# Patient Record
Sex: MALE | Race: BLACK OR AFRICAN AMERICAN | Employment: OTHER | URBAN - NONMETROPOLITAN AREA
[De-identification: names, ages, dates, MRNs, and addresses within clinical notes are randomized per-mention and may not be internally consistent; named-entity substitution may affect disease eponyms.]

---

## 2020-09-18 ENCOUNTER — HOSPITAL ENCOUNTER (EMERGENCY)
Age: 70
Discharge: HOME | End: 2020-09-18
Attending: EMERGENCY MEDICINE
Payer: OTHER GOVERNMENT

## 2020-09-18 VITALS
WEIGHT: 158 LBS | RESPIRATION RATE: 18 BRPM | HEART RATE: 75 BPM | TEMPERATURE: 97.6 F | SYSTOLIC BLOOD PRESSURE: 96 MMHG | OXYGEN SATURATION: 99 % | HEIGHT: 67 IN | BODY MASS INDEX: 24.8 KG/M2 | DIASTOLIC BLOOD PRESSURE: 65 MMHG

## 2020-09-18 LAB
ANION GAP SERPL CALCULATED.3IONS-SCNC: 11 MEQ/L (ref 8–16)
BASOPHILS # BLD: 0.7 %
BASOPHILS ABSOLUTE: 0.1 THOU/MM3 (ref 0–0.1)
BUN BLDV-MCNC: 16 MG/DL (ref 7–22)
CALCIUM SERPL-MCNC: 9 MG/DL (ref 8.5–10.5)
CHLORIDE BLD-SCNC: 99 MEQ/L (ref 98–111)
CO2: 21 MEQ/L (ref 23–33)
CREAT SERPL-MCNC: 0.8 MG/DL (ref 0.4–1.2)
EKG ATRIAL RATE: 84 BPM
EKG P AXIS: 57 DEGREES
EKG P-R INTERVAL: 176 MS
EKG Q-T INTERVAL: 346 MS
EKG QRS DURATION: 80 MS
EKG QTC CALCULATION (BAZETT): 408 MS
EKG R AXIS: 55 DEGREES
EKG T AXIS: 61 DEGREES
EKG VENTRICULAR RATE: 84 BPM
EOSINOPHIL # BLD: 1.2 %
EOSINOPHILS ABSOLUTE: 0.1 THOU/MM3 (ref 0–0.4)
ERYTHROCYTE [DISTWIDTH] IN BLOOD BY AUTOMATED COUNT: 14.4 % (ref 11.5–14.5)
ERYTHROCYTE [DISTWIDTH] IN BLOOD BY AUTOMATED COUNT: 46.5 FL (ref 35–45)
GFR SERPL CREATININE-BSD FRML MDRD: > 90 ML/MIN/1.73M2
GLUCOSE BLD-MCNC: 138 MG/DL (ref 70–108)
HCT VFR BLD CALC: 42.1 % (ref 42–52)
HEMOGLOBIN: 13.9 GM/DL (ref 14–18)
IMMATURE GRANS (ABS): 0.01 THOU/MM3 (ref 0–0.07)
IMMATURE GRANULOCYTES: 0.1 %
LYMPHOCYTES # BLD: 17.5 %
LYMPHOCYTES ABSOLUTE: 1.3 THOU/MM3 (ref 1–4.8)
MCH RBC QN AUTO: 28.9 PG (ref 26–33)
MCHC RBC AUTO-ENTMCNC: 33 GM/DL (ref 32.2–35.5)
MCV RBC AUTO: 87.5 FL (ref 80–94)
MONOCYTES # BLD: 5.5 %
MONOCYTES ABSOLUTE: 0.4 THOU/MM3 (ref 0.4–1.3)
NUCLEATED RED BLOOD CELLS: 0 /100 WBC
OSMOLALITY CALCULATION: 266 MOSMOL/KG (ref 275–300)
PLATELET # BLD: 379 THOU/MM3 (ref 130–400)
PMV BLD AUTO: 9 FL (ref 9.4–12.4)
POTASSIUM REFLEX MAGNESIUM: 4.7 MEQ/L (ref 3.5–5.2)
PRO-BNP: 59.9 PG/ML (ref 0–900)
RBC # BLD: 4.81 MILL/MM3 (ref 4.7–6.1)
SEG NEUTROPHILS: 75 %
SEGMENTED NEUTROPHILS ABSOLUTE COUNT: 5.5 THOU/MM3 (ref 1.8–7.7)
SODIUM BLD-SCNC: 131 MEQ/L (ref 135–145)
WBC # BLD: 7.3 THOU/MM3 (ref 4.8–10.8)

## 2020-09-18 PROCEDURE — 36415 COLL VENOUS BLD VENIPUNCTURE: CPT

## 2020-09-18 PROCEDURE — 83880 ASSAY OF NATRIURETIC PEPTIDE: CPT

## 2020-09-18 PROCEDURE — 80048 BASIC METABOLIC PNL TOTAL CA: CPT

## 2020-09-18 PROCEDURE — 99284 EMERGENCY DEPT VISIT MOD MDM: CPT

## 2020-09-18 PROCEDURE — 93005 ELECTROCARDIOGRAM TRACING: CPT | Performed by: EMERGENCY MEDICINE

## 2020-09-18 PROCEDURE — 85025 COMPLETE CBC W/AUTO DIFF WBC: CPT

## 2020-09-18 SDOH — HEALTH STABILITY: MENTAL HEALTH: HOW OFTEN DO YOU HAVE A DRINK CONTAINING ALCOHOL?: NEVER

## 2020-09-18 NOTE — ED PROVIDER NOTES
ATTENDING ATTESTATION  Evaluation of Alejandro Leahy. Patient seen and examined by me. Case discussed and care plan developed with resident. I agree with the note and plan as documented by her, except if my documentation differs. I reviewed the medical, surgical, family and social history, medications and allergies. I have reviewed the nursing documentation. I have reviewed the patient's vital signs. Patient c/o sent to the emergency department for low sodium level. States that [facility] called him and told him to go to the ER. He reports he has had low sodium in the past but cannot remember his baseline number. He denies any symptoms at this time. Physical exam is notable for well appearing, regular rate and rhythm, no murmurs rubs or gallops. Lungs are clear to auscultation bilaterally no wheezes crackles or rhonchi. Patient is alert, resting on bed, in no distress. MDM: Sent to the emergency department for hyponatremia, repeat sodium is 131. Patient is well-appearing, he should follow-up on an outpatient basis given that his sodium level is improved. Plan: As above    Please see the residents' completed note for final disposition except as documented on this attestation. Diagnosis, treatment and disposition plans were discussed and agreed upon by patient. This transcription was electronically signed. It was dictated by use of voice recognition software and electronically transcribed. The transcription may contain errors not detected in proofreading.      I was present for the critical portion following procedures: None       Electronically signed by Jessica Pritchard MD on 9/18/20 at 6:03 PM KD Gray MD  09/18/20 9049

## 2020-09-18 NOTE — ED NOTES
Patient resting on cart, registration at bedside. Patient educated on how to use call light remote for the television.      Galileo Meng RN  09/18/20 2166

## 2020-09-18 NOTE — ED PROVIDER NOTES
[place] ENCOUNTER          Pt Name: Sandrita Mejia  MRN: [id number]  [place] 1950  Date of evaluation: 9/18/2020  Treating Resident Physician: Jacquelyn Jernigan MD  Supervising Physician: Dr. Jah Johnson       Chief Complaint   Patient presents with    Abnormal Lab     History obtained from the patient. HISTORY OF PRESENT ILLNESS    Sandrita Mejia is a 78 y.o. male schizophrenia, hypertension, diabetes, who presents to the emergency department for evaluation of weakness and abnormal lab results. Patient was seen outpatient [facility] with routine lab work, which showed a result of 127 sodium. Patient states that over the past year he has had some chronic generalized weakness, especially in his lower extremities, associated with longstanding neuropathy. Patient denies any acute symptoms. Patient attests to no excess water or alcohol intake, no history of smoking. Patient does receive paliperidone injection from [facility]. Patient denies any confusion, new weakness, shortness of breath, nausea. Patient states that he got routine lab work done in [place]  The patient has no other acute complaints at this time.        REVIEW OF SYSTEMS   Review of Systems   Constitutional: Negative for activity change, appetite change, chills, diaphoresis, fatigue, fever and unexpected weight change. HENT: Negative for rhinorrhea, sinus pressure, sinus pain, sneezing, sore throat, trouble swallowing and voice change. Eyes: Negative for photophobia, pain, redness and visual disturbance. Respiratory: Negative for apnea, cough, choking, chest tightness, shortness of breath, wheezing and stridor. Cardiovascular: Negative for chest pain, palpitations and leg swelling. Gastrointestinal: Negative for abdominal distention, abdominal pain, constipation, diarrhea, nausea and vomiting. Endocrine: Negative for polydipsia, polyphagia and polyuria. Genitourinary: Negative for decreased urine volume, difficulty urinating, dysuria, enuresis, flank pain, frequency, hematuria and urgency. Musculoskeletal: Negative for arthralgias, back pain and gait problem. PAST MEDICAL AND SURGICAL HISTORY     Past Medical History:   Diagnosis Date    Diabetes mellitus ([facility] [address]     Hyperlipidemia      Past Surgical History:   Procedure Laterality Date    COLONOSCOPY      UPPER GASTROINTESTINAL ENDOSCOPY           MEDICATIONS   No current facility-administered medications for this encounter.      Current Outpatient Medications:     hydrOXYzine (ATARAX) 10 MG tablet, Take 10 mg by mouth 3 times daily as needed for Itching, Disp: , Rfl:     buPROPion (WELLBUTRIN XL) 300 MG extended release tablet, Take 300 mg by mouth every morning, Disp: , Rfl:     metFORMIN (GLUCOPHAGE) 1000 MG tablet, Take 1,000 mg by mouth 2 times daily (with meals), Disp: , Rfl:     ferrous sulfate 325 (65 FE) MG tablet, Take 325 mg by mouth daily (with breakfast), Disp: , Rfl:     finasteride (PROSCAR) 5 MG tablet, Take 5 mg by mouth daily, Disp: , Rfl:     omeprazole (PRILOSEC) 20 MG delayed release capsule, Take 20 mg by mouth daily, Disp: , Rfl:     Multiple Vitamins-Minerals (THERAPEUTIC MULTIVITAMIN-MINERALS) tablet, Take 1 tablet by mouth daily, Disp: , Rfl:     aspirin 81 MG tablet, Take 81 mg by mouth daily, Disp: , Rfl:     sildenafil (VIAGRA) 100 MG tablet, Take 100 mg by mouth as needed for Erectile Dysfunction, Disp: , Rfl:     atorvastatin (LIPITOR) 80 MG tablet, Take 80 mg by mouth daily, Disp: , Rfl:     pioglitazone (ACTOS) 30 MG tablet, Take 30 mg by mouth daily, Disp: , Rfl:       SOCIAL HISTORY     Social History     Social History Narrative    Not on file     Social History     Tobacco Use    Smoking status: Never Smoker    Smokeless tobacco: Never Used   Substance Use Topics    Alcohol use: Never     Frequency: Never    Drug use: Never         ALLERGIES   No Known Allergies      FAMILY HISTORY   History reviewed. No pertinent family history. PREVIOUS RECORDS   Previous records reviewed: Past medical, past surgical, allergies, medications. PHYSICAL EXAM     ED Triage Vitals [09/18/20 1601]   BP Temp Temp Source Pulse Resp SpO2 Height Weight   104/69 97.6 °F (36.4 °C) Oral 94 18 98 % 5' 7\" (1.702 m) 158 lb (71.7 kg)     Initial vital signs and nursing assessment reviewed and normal. Pulsoximetry is normal per my interpretation. Additional Vital Signs:  Vitals:    09/18/20 1721   BP: 96/65   Pulse: 75   Resp: 18   Temp:    SpO2: 99%       Physical Exam  Constitutional:       Appearance: Normal appearance. He is normal weight. HENT:      Head: Normocephalic and atraumatic. Right Ear: External ear normal.      Left Ear: External ear normal.      Nose: Nose normal.      Mouth/Throat:      Mouth: Mucous membranes are moist.      Pharynx: Oropharynx is clear. Eyes:      Extraocular Movements: Extraocular movements intact. Conjunctiva/sclera: Conjunctivae normal.      Pupils: Pupils are equal, round, and reactive to light. Neck:      Musculoskeletal: Normal range of motion and neck supple. Cardiovascular:      Rate and Rhythm: Normal rate and regular rhythm. Pulses: Normal pulses. Heart sounds: Normal heart sounds. Pulmonary:      Effort: Pulmonary effort is normal. No respiratory distress. Breath sounds: Normal breath sounds. No stridor. No wheezing or rales. Abdominal:      General: Abdomen is flat. Palpations: Abdomen is soft. Musculoskeletal: Normal range of motion. General: No swelling. Right lower leg: No edema. Left lower leg: No edema. Neurological:      Mental Status: He is alert. Cranial Nerves: No cranial nerve deficit. Sensory: Sensory deficit (Chronically decreased sensation to his distal lower extremities) present. Motor: No weakness.       Coordination: Coordination normal.      Gait: Gait normal.      Deep Tendon Reflexes: Reflexes normal.   Psychiatric:         Mood and Affect: Mood normal.         Behavior: Behavior normal.         Thought Content: Thought content normal.         Judgment: Judgment normal.             MEDICAL DECISION MAKING   Initial Assessment: 57-year-old male past medical history of schizophrenia on paliperidone, previous chronic hyponatremia, hypertension, diabetes, who presented due to abnormal lab result yesterday of sodium of 127, now 131. Potential diagnoses include but are not limited to beer Potomania, psychogenic polydipsia, SIADH secondary to psych medications. Patient does not appear to be having any acute symptoms of hyponatremia. Plan: Discharge to home with instructions to ensure he is not drinking excessive amounts of fluids, eat good meals, and follow-up with primary care provider. ED RESULTS   Laboratory results:  Labs Reviewed   BASIC METABOLIC PANEL W/ REFLEX TO MG FOR LOW K - Abnormal; Notable for the following components:       Result Value    Sodium 131 (*)     CO2 21 (*)     Glucose 138 (*)     All other components within normal limits   CBC WITH AUTO DIFFERENTIAL - Abnormal; Notable for the following components:    Hemoglobin 13.9 (*)     RDW-SD 46.5 (*)     MPV 9.0 (*)     All other components within normal limits   OSMOLALITY - Abnormal; Notable for the following components:    Osmolality Calc 266.0 (*)     All other components within normal limits   BRAIN NATRIURETIC PEPTIDE   ANION GAP   GLOMERULAR FILTRATION RATE, ESTIMATED       Radiologic studies results:  No orders to display       ED Medications administered this visit: Medications - No data to display      ED COURSE        Strict return precautions and follow up instructions were discussed with the patient prior to discharge, with which the patient agrees.       MEDICATION CHANGES     Discharge Medication List as of 9/18/2020  6:09 PM            FINAL DISPOSITION     Final diagnoses:   Chronic hyponatremia     Condition: condition: good  Dispo: Discharge to home      This transcription was electronically signed. Parts of this transcriptions may have been dictated by use of voice recognition software and electronically transcribed, and parts may have been transcribed with the assistance of an ED scribe. The transcription may contain errors not detected in proofreading. Please refer to my supervising physician's documentation if my documentation differs.     Electronically Signed: Hunter Lane, 09/18/20, 7:55 PM          Hunter Lane MD  Resident  09/18/20 5732

## 2020-09-18 NOTE — ED TRIAGE NOTES
Patient to room 6 from triage for complaint of hyponatremia. Patient was seen by his  primary care provider for dizziness and was advised to come in today due to his sodium level. Patient has lab results that indicate that his sodium level was 127 yesterday.

## 2020-09-19 PROCEDURE — 93010 ELECTROCARDIOGRAM REPORT: CPT | Performed by: NUCLEAR MEDICINE

## 2020-11-14 ENCOUNTER — HOSPITAL ENCOUNTER (EMERGENCY)
Age: 70
Discharge: HOME | End: 2020-11-14
Attending: EMERGENCY MEDICINE
Payer: OTHER GOVERNMENT

## 2020-11-14 VITALS
DIASTOLIC BLOOD PRESSURE: 71 MMHG | OXYGEN SATURATION: 99 % | TEMPERATURE: 98.3 F | SYSTOLIC BLOOD PRESSURE: 134 MMHG | WEIGHT: 158 LBS | HEART RATE: 91 BPM | RESPIRATION RATE: 16 BRPM | BODY MASS INDEX: 24.75 KG/M2

## 2020-11-14 PROCEDURE — U0003 INFECTIOUS AGENT DETECTION BY NUCLEIC ACID (DNA OR RNA); SEVERE ACUTE RESPIRATORY SYNDROME CORONAVIRUS 2 (SARS-COV-2) (CORONAVIRUS DISEASE [COVID-19]), AMPLIFIED PROBE TECHNIQUE, MAKING USE OF HIGH THROUGHPUT TECHNOLOGIES AS DESCRIBED BY CMS-2020-01-R: HCPCS

## 2020-11-14 PROCEDURE — 99282 EMERGENCY DEPT VISIT SF MDM: CPT

## 2020-11-14 ASSESSMENT — ENCOUNTER SYMPTOMS
RHINORRHEA: 0
SHORTNESS OF BREATH: 0
VOMITING: 0
DIARRHEA: 0
NAUSEA: 0
ABDOMINAL PAIN: 0
COUGH: 0
CONSTIPATION: 0
SORE THROAT: 0

## 2020-11-14 ASSESSMENT — PAIN DESCRIPTION - LOCATION: LOCATION: ABDOMEN

## 2020-11-14 ASSESSMENT — PAIN DESCRIPTION - PAIN TYPE: TYPE: ACUTE PAIN

## 2020-11-14 ASSESSMENT — PAIN SCALES - GENERAL: PAINLEVEL_OUTOF10: 1

## 2020-11-14 NOTE — ED PROVIDER NOTES
[facility] [place]          CHIEF COMPLAINT       Chief Complaint   Patient presents with    Abdominal Pain    Covid Testing       Nurses Notes reviewed and I agree except as noted in the HPI. HISTORY OF PRESENT ILLNESS    Sofia Miller is a 78 y.o. male who presents the emergency department for request for Covid testing. He states that he had some generalized malaise which began around 11 AM.  He asked for a Covid swab. He denies fever, cough, sore throat, runny nose, rash, chest pain, abdominal pain, nausea, vomiting, diarrhea, loss of sense of taste or smell, dysuria, lower extremity pain or swelling, shortness of breath, or any other concerns. Patient declines any other testing, states he only wants the Covid swab performed. No other initial complaints or concerns. REVIEW OF SYSTEMS     Review of Systems   Constitutional: Positive for fatigue. Negative for diaphoresis and fever. HENT: Negative for congestion, rhinorrhea and sore throat. Eyes: Negative for visual disturbance. Respiratory: Negative for cough and shortness of breath. Cardiovascular: Negative for chest pain, palpitations and leg swelling. Gastrointestinal: Negative for abdominal pain, constipation, diarrhea, nausea and vomiting. Endocrine: Negative for polyuria. Genitourinary: Negative for dysuria. Musculoskeletal: Negative for joint swelling. Skin: Negative for rash. Neurological: Negative for dizziness, seizures, syncope, speech difficulty, weakness, numbness and headaches. Hematological: Negative for adenopathy. Psychiatric/Behavioral: Negative for confusion and self-injury. All other systems reviewed and are negative. PAST MEDICAL HISTORY    has a past medical history of Diabetes mellitus ([facility] [address] and Hyperlipidemia. SURGICAL HISTORY      has a past surgical history that includes Colonoscopy and Upper gastrointestinal endoscopy.     CURRENT MEDICATIONS Discharge Medication List as of 11/14/2020  3:44 PM      CONTINUE these medications which have NOT CHANGED    Details   hydrOXYzine (ATARAX) 10 MG tablet Take 10 mg by mouth 3 times daily as needed for Itching      buPROPion (WELLBUTRIN XL) 300 MG extended release tablet Take 300 mg by mouth every morning      metFORMIN (GLUCOPHAGE) 1000 MG tablet Take 1,000 mg by mouth 2 times daily (with meals)      ferrous sulfate 325 (65 FE) MG tablet Take 325 mg by mouth daily (with breakfast)      finasteride (PROSCAR) 5 MG tablet Take 5 mg by mouth daily      omeprazole (PRILOSEC) 20 MG delayed release capsule Take 20 mg by mouth daily      Multiple Vitamins-Minerals (THERAPEUTIC MULTIVITAMIN-MINERALS) tablet Take 1 tablet by mouth daily      aspirin 81 MG tablet Take 81 mg by mouth daily      sildenafil (VIAGRA) 100 MG tablet Take 100 mg by mouth as needed for Erectile Dysfunction      atorvastatin (LIPITOR) 80 MG tablet Take 80 mg by mouth daily      pioglitazone (ACTOS) 30 MG tablet Take 30 mg by mouth daily             ALLERGIES     has No Known Allergies. FAMILY HISTORY     has no family status information on file. family history is not on file. SOCIAL HISTORY      reports that he has never smoked. He has never used smokeless tobacco. He reports that he does not drink alcohol or use drugs. PHYSICAL EXAM     INITIAL VITALS:  weight is 158 lb (71.7 kg). His oral temperature is 98.3 °F (36.8 °C). His blood pressure is 134/71 and his pulse is 91. His respiration is 16 and oxygen saturation is 99%.       CONSTITUTIONAL: [Awake, alert, non toxic, well developed, well nourished, no acute distress, laying on the stretcher with his legs crossed the ankle, resting comfortably with his hand behind his head.]  HEAD: [Normocephalic, atraumatic]  EYES: [Pupils equal, round & reactive to light, extraocular movements intact, no nystagmus, clear conjunctiva, non-icteric sclera]  ENT: [External ear canal clear without evidence of cerumen impaction or foreign body, TM's clear without erythema or bulging. Nares patent without drainage, septum appears midline. Moist mucus membranes.]  NECK: [Nontender and supple. No appreciated lymphadenopathy. Intact full range of motion. Trachea midline.]  CHEST: [Inspection normal, no lesions, equal rise.]  CARDIOVASCULAR: [Regular rate, rhythm, normal S1 and S2. No appreciated murmurs, rubs, or gallops. No pulse deficits appreciated. Intact distal perfusion. JVD not appreciated.]  PULMONARY: [Respiratory distress absent. Respiratory effort normal. Breath sounds clear to auscultation without rhonchi, rales, or wheezing. No accessory muscle use. No stridor]  ABDOMEN: [Inspection normal, without surgical scars. Soft, non-tender, non-distended, with normoactive bowel sounds. No palpable masses, rebound, or guarding]  BACK: [Intact ROM. No midline vertebral tenderness, step off, or crepitus. No CVA tenderness.]  MUSCULOSKELETAL: [Extremities nontender to palpation. No gross deformity or evidence of external trauma. Intact range of motion. Sensation intact. No clubbing, cyanosis, or edema.]  SKIN: [Warm, dry. No jaundice, rash, urticaria, or petechiae]  NEUROLOGIC: [Alert and oriented x 3, GCS 15, normal mentation for age. Moves all four extremities. No gross sensory deficit.  Cerebellar function grossly normal.]  PSYCHIATRIC: [Normal mood and affect, thought process is clear and linear]     DIFFERENTIAL DIAGNOSIS:   Concern for covid19     DIAGNOSTIC RESULTS       RADIOLOGY: non-plain film images(s) such as CT,Ultrasound and MRI are read by the radiologist.    No orders to display     [] Visualized and interpreted by me   [] Radiologist's Wet Read Report Reviewed   [] Discussed withRadiologist.    LABS:   Labs Reviewed   COVID-19    Narrative:     Epic Plan - [id number]       EMERGENCY DEPARTMENT COURSE:   Vitals:    Vitals:    11/14/20 1450   BP: 134/71   Pulse: 91   Resp: 16   Temp: 98.3 °F (36.8 °C) TempSrc: Oral   SpO2: 99%   Weight: 158 lb (71.7 kg)     The results of pertinent diagnostic studies and exam findings were discussed. The patients provisional diagnosis and plan of care were discussed with the patient and present family. The patient and/or present family expressed understanding of the diagnosis and plan. The nurse was instructed to provide written instructions and appropriate follow-up information. The patient understands their need and responsibility to obtain additional follow-up as instructed. The patient is comfortable with the plan and discharge. The risks of medications administered and prescribed were discussed with the patient and family present. CRITICAL CARE:   none    CONSULTS:  none    PROCEDURES:  None    FINAL IMPRESSION      1. Encounter for laboratory testing for COVID-19 virus          DISPOSITION/PLAN   discharge    PATIENT REFERRED TO:    Be sure to follow-up with your primary care doctor at the [facility] in 1 to 2 days. DISCHARGE MEDICATIONS:  Discharge Medication List as of 11/14/2020  3:44 PM          (Please note that portions of this note were completed with a voice recognition program.  Efforts were made to edit the dictations but occasionally words are mis-transcribed.)    Provider:  I personally performed the services described in the documentation, reviewed and edited the documentation which was dictated, and it accurately records my words and actions.     Amanda Fields MD 11/14/20 5:26 PM                Amanda Fields MD  11/14/20 7849

## 2020-11-14 NOTE — ED TRIAGE NOTES
Pt presents to the ED for an \"upset stomach\" that started around 1100 today. Pt denies nausea, emesis and diarrhea. Pt states pain is 1/10.  Pt asking for COVID test.

## 2020-11-16 ENCOUNTER — CARE COORDINATION (OUTPATIENT)
Dept: CARE COORDINATION | Age: 70
End: 2020-11-16

## 2020-11-16 LAB
PERFORMING LAB: NORMAL
REPORT: NORMAL
SARS-COV-2: NOT DETECTED

## 2020-11-16 NOTE — CARE COORDINATION
Patient contacted regarding recent discharge and COVID-19 risk. Discussed COVID-19 related testing which was pending at this time. Test results were pending. Patient informed of results, if available? Results pending     Care Transition Nurse/ Ambulatory Care Manager contacted the patient by telephone to perform post discharge assessment. Verified name and  with patient as identifiers. Patient has following risk factors of: no known risk factors. CTN/ACM reviewed discharge instructions, medical action plan and red flags related to discharge diagnosis. Reviewed and educated them on any new and changed medications related to discharge diagnosis. Advised obtaining a 90-day supply of all daily and as-needed medications. Education provided regarding infection prevention, and signs and symptoms of COVID-19 and when to seek medical attention with patient who verbalized understanding. Discussed exposure protocols and quarantine from [address] you at higher risk for severe illness  and given an opportunity for questions and concerns. The patient agrees to contact the COVID-19 hotline [phone] or PCP office for questions related to their healthcare. CTN/ACM provided contact information for future reference. From CDC: Are you at higher risk for severe illness?  Wash your hands often.  Avoid close contact (6 feet, which is about two arm lengths) with people who are sick.  Put distance between yourself and other people if COVID-19 is spreading in your community.  Clean and disinfect frequently touched surfaces.  Avoid all cruise travel and non-essential air travel.  Call your healthcare professional if you have concerns about COVID-19 and your underlying condition or if you are sick. For more information on steps you can take to protect yourself, see CDC's How to [address] [place] [address] for follow-up call in 1-2 days based on severity of symptoms and risk factors. pt reports that his s/s have resolved. Denies nausea at this time. Denies any other s/s. Unable to participate in Loop. Advised to self isolate until results come back. Encouraged f/u with PCP.

## 2020-11-17 ENCOUNTER — CARE COORDINATION (OUTPATIENT)
Dept: CARE COORDINATION | Age: 70
End: 2020-11-17

## 2020-11-17 NOTE — CARE COORDINATION
You Patient resolved from the Care Transitions episode on 11/17  Discussed COVID-19 related testing which was available at this time. Test results were negative. Patient informed of results, if available? Yes    Patient/family has been provided the following resources and education related to COVID-19:                         Signs, symptoms and red flags related to COVID-19            CDC exposure and quarantine guidelines            [employer] exposure contact - [phone]            Contact for their local Department of Health                 Patient currently reports that the following symptoms have improved:  pt reports all symptoms have resolved     No further outreach scheduled with this CTN/ACM. Episode of Care resolved. Patient has this CTN/ACM contact information if future needs arise. Pt informed of negative results of COVID test.  Verbalizes understanding.

## 2025-02-26 ENCOUNTER — APPOINTMENT (OUTPATIENT)
Dept: GENERAL RADIOLOGY | Age: 75
End: 2025-02-26
Payer: OTHER GOVERNMENT

## 2025-02-26 ENCOUNTER — HOSPITAL ENCOUNTER (EMERGENCY)
Age: 75
Discharge: HOME | End: 2025-02-26
Payer: OTHER GOVERNMENT

## 2025-02-26 VITALS
WEIGHT: 161 LBS | DIASTOLIC BLOOD PRESSURE: 80 MMHG | RESPIRATION RATE: 18 BRPM | TEMPERATURE: 97.8 F | SYSTOLIC BLOOD PRESSURE: 126 MMHG | OXYGEN SATURATION: 98 % | BODY MASS INDEX: 25.27 KG/M2 | HEIGHT: 67 IN | HEART RATE: 66 BPM

## 2025-02-26 DIAGNOSIS — M25.361 KNEE INSTABILITY, RIGHT: Primary | ICD-10-CM

## 2025-02-26 PROCEDURE — 99283 EMERGENCY DEPT VISIT LOW MDM: CPT

## 2025-02-26 PROCEDURE — 73564 X-RAY EXAM KNEE 4 OR MORE: CPT

## 2025-02-26 ASSESSMENT — PAIN - FUNCTIONAL ASSESSMENT: PAIN_FUNCTIONAL_ASSESSMENT: NONE - DENIES PAIN

## 2025-02-26 NOTE — ED PROVIDER NOTES
[facility] EMERGENCY DEPARTMENT      EMERGENCY MEDICINE     Pt Name: Rajat Haywood  MRN: [id number]  Birthdate 1950  Date of evaluation: 2/26/2025  Provider: LOYDA Trevino NP    CHIEF COMPLAINT       Chief Complaint   Patient presents with    Leg Problem     HISTORY OF PRESENT ILLNESS   Rajat Haywood is a pleasant 75 y.o. male who presents to the emergency department from home, by private vehicle for evaluation of right knee giving out. Patient states he has this complaint since 2004 and uses a knee brace for support. He resides alone at home and did not have the brace on last night and fell on his knee. Patient denies any fall on his head, loss of consciousness. He also denies any pain, numbness, tingling, urinary incontinence, back pain.     PASTMEDICAL HISTORY     Past Medical History:   Diagnosis Date    Diabetes mellitus (HCC)     Hyperlipidemia        There is no problem list on file for this patient.    SURGICAL HISTORY       Past Surgical History:   Procedure Laterality Date    COLONOSCOPY      UPPER GASTROINTESTINAL ENDOSCOPY         CURRENT MEDICATIONS       Previous Medications    ASPIRIN 81 MG TABLET    Take 81 mg by mouth daily    ATORVASTATIN (LIPITOR) 80 MG TABLET    Take 80 mg by mouth daily    BUPROPION (WELLBUTRIN XL) 300 MG EXTENDED RELEASE TABLET    Take 300 mg by mouth every morning    FERROUS SULFATE 325 (65 FE) MG TABLET    Take 325 mg by mouth daily (with breakfast)    FINASTERIDE (PROSCAR) 5 MG TABLET    Take 5 mg by mouth daily    HYDROXYZINE (ATARAX) 10 MG TABLET    Take 10 mg by mouth 3 times daily as needed for Itching    METFORMIN (GLUCOPHAGE) 1000 MG TABLET    Take 1,000 mg by mouth 2 times daily (with meals)    MULTIPLE VITAMINS-MINERALS (THERAPEUTIC MULTIVITAMIN-MINERALS) TABLET    Take 1 tablet by mouth daily    OMEPRAZOLE (PRILOSEC) 20 MG DELAYED RELEASE CAPSULE    Take 20 mg by mouth daily    PIOGLITAZONE (ACTOS) 30 MG TABLET    Take 30 mg by mouth daily

## 2025-02-26 NOTE — ED NOTES
Pt presents to the ED with complaints of R leg giving out when he walks. 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 Pt arrived with knee brace in place and states he uses a cane to walk. Pt denies any pain or injury related to the falls.

## 2025-02-26 NOTE — DISCHARGE INSTRUCTIONS
Go to the [facility], [place] during the hours scheduled on discharge paperwork.   Continue to wear the brace until seen by Orthopedics.   You can use Tylenol 1000 mg every 6 hours as needed for pain.

## 2025-03-17 ENCOUNTER — APPOINTMENT (OUTPATIENT)
Dept: GENERAL RADIOLOGY | Age: 75
DRG: 884 | End: 2025-03-17
Payer: OTHER GOVERNMENT

## 2025-03-17 ENCOUNTER — APPOINTMENT (OUTPATIENT)
Dept: CT IMAGING | Age: 75
DRG: 884 | End: 2025-03-17
Payer: OTHER GOVERNMENT

## 2025-03-17 ENCOUNTER — HOSPITAL ENCOUNTER (INPATIENT)
Age: 75
LOS: 5 days | Discharge: NURSING, REHAB OR LONG-TERM CARE | DRG: 884 | End: 2025-03-25
Attending: NURSE PRACTITIONER | Admitting: NURSE PRACTITIONER
Payer: OTHER GOVERNMENT
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
## 2025-03-17 DIAGNOSIS — E11.69 TYPE 2 DIABETES MELLITUS WITH OTHER SPECIFIED COMPLICATION, UNSPECIFIED WHETHER LONG TERM INSULIN USE (HCC): ICD-10-CM

## 2025-03-17 DIAGNOSIS — Z02.2 ENCOUNTER FOR EXAMINATION FOR ADMISSION TO NURSING HOME: ICD-10-CM

## 2025-03-17 DIAGNOSIS — Z87.891 HX OF SMOKING: ICD-10-CM

## 2025-03-17 DIAGNOSIS — I73.9 PAD (PERIPHERAL ARTERY DISEASE): ICD-10-CM

## 2025-03-17 DIAGNOSIS — R29.6 MULTIPLE FALLS: Primary | ICD-10-CM
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
## 2025-03-17 LAB
ALBUMIN SERPL BCG-MCNC: 3.6 G/DL (ref 3.4–4.9)
ALP SERPL-CCNC: 134 U/L (ref 40–129)
ALT SERPL W/O P-5'-P-CCNC: 13 U/L (ref 10–50)
ANION GAP SERPL CALC-SCNC: 11 MEQ/L (ref 8–16)
AST SERPL-CCNC: 24 U/L (ref 10–50)
BASOPHILS ABSOLUTE: 0.1 THOU/MM3 (ref 0–0.1)
BASOPHILS NFR BLD AUTO: 0.8 %
BILIRUB SERPL-MCNC: 0.9 MG/DL (ref 0.3–1.2)
BUN SERPL-MCNC: 26 MG/DL (ref 8–23)
CALCIUM SERPL-MCNC: 9.5 MG/DL (ref 8.8–10.2)
CHLORIDE SERPL-SCNC: 105 MEQ/L (ref 98–111)
CO2 SERPL-SCNC: 21 MEQ/L (ref 22–29)
CREAT SERPL-MCNC: 0.7 MG/DL (ref 0.7–1.2)
DEPRECATED RDW RBC AUTO: 50.9 FL (ref 35–45)
EOSINOPHIL NFR BLD AUTO: 1.3 %
EOSINOPHILS ABSOLUTE: 0.1 THOU/MM3 (ref 0–0.4)
ERYTHROCYTE [DISTWIDTH] IN BLOOD BY AUTOMATED COUNT: 18.4 % (ref 11.5–14.5)
GFR SERPL CREATININE-BSD FRML MDRD: > 90 ML/MIN/1.73M2
GLUCOSE BLD STRIP.AUTO-MCNC: 159 MG/DL (ref 70–108)
GLUCOSE BLD STRIP.AUTO-MCNC: 94 MG/DL (ref 70–108)
GLUCOSE SERPL-MCNC: 118 MG/DL (ref 74–109)
HCT VFR BLD AUTO: 56.4 % (ref 42–52)
HGB BLD-MCNC: 17.8 GM/DL (ref 14–18)
IMM GRANULOCYTES # BLD AUTO: 0.04 THOU/MM3 (ref 0–0.07)
IMM GRANULOCYTES NFR BLD AUTO: 0.4 %
LYMPHOCYTES ABSOLUTE: 0.5 THOU/MM3 (ref 1–4.8)
LYMPHOCYTES NFR BLD AUTO: 5.8 %
MCH RBC QN AUTO: 26.6 PG (ref 26–33)
MCHC RBC AUTO-ENTMCNC: 31.6 GM/DL (ref 32.2–35.5)
MCV RBC AUTO: 84.3 FL (ref 80–94)
MONOCYTES ABSOLUTE: 0.5 THOU/MM3 (ref 0.4–1.3)
MONOCYTES NFR BLD AUTO: 4.9 %
NEUTROPHILS ABSOLUTE: 8 THOU/MM3 (ref 1.8–7.7)
NEUTROPHILS NFR BLD AUTO: 86.8 %
NRBC BLD AUTO-RTO: 0 /100 WBC
OSMOLALITY SERPL CALC.SUM OF ELEC: 279.7 MOSMOL/KG (ref 275–300)
PLATELET # BLD AUTO: 278 THOU/MM3 (ref 130–400)
PMV BLD AUTO: 8.7 FL (ref 9.4–12.4)
POTASSIUM SERPL-SCNC: 4 MEQ/L (ref 3.5–5.2)
PROT SERPL-MCNC: 6.5 G/DL (ref 6.4–8.3)
RBC # BLD AUTO: 6.69 MILL/MM3 (ref 4.7–6.1)
SODIUM SERPL-SCNC: 137 MEQ/L (ref 135–145)
WBC # BLD AUTO: 9.2 THOU/MM3 (ref 4.8–10.8)

## 2025-03-17 PROCEDURE — 73630 X-RAY EXAM OF FOOT: CPT

## 2025-03-17 PROCEDURE — 82948 REAGENT STRIP/BLOOD GLUCOSE: CPT

## 2025-03-17 PROCEDURE — 2500000003 HC RX 250 WO HCPCS: Performed by: NURSE PRACTITIONER

## 2025-03-17 PROCEDURE — 96374 THER/PROPH/DIAG INJ IV PUSH: CPT

## 2025-03-17 PROCEDURE — 6360000002 HC RX W HCPCS: Performed by: NURSE PRACTITIONER

## 2025-03-17 PROCEDURE — 85025 COMPLETE CBC W/AUTO DIFF WBC: CPT

## 2025-03-17 PROCEDURE — 99223 1ST HOSP IP/OBS HIGH 75: CPT | Performed by: NURSE PRACTITIONER

## 2025-03-17 PROCEDURE — 93005 ELECTROCARDIOGRAM TRACING: CPT | Performed by: PHYSICIAN ASSISTANT

## 2025-03-17 PROCEDURE — 6820000001 HC L2 TRAUMA SURGERY EVALUATION: Performed by: ORTHOPAEDIC SURGERY

## 2025-03-17 PROCEDURE — 6360000002 HC RX W HCPCS: Performed by: PHYSICIAN ASSISTANT

## 2025-03-17 PROCEDURE — 99285 EMERGENCY DEPT VISIT HI MDM: CPT

## 2025-03-17 PROCEDURE — G0378 HOSPITAL OBSERVATION PER HR: HCPCS

## 2025-03-17 PROCEDURE — 72131 CT LUMBAR SPINE W/O DYE: CPT

## 2025-03-17 PROCEDURE — 80053 COMPREHEN METABOLIC PANEL: CPT

## 2025-03-17 PROCEDURE — 36415 COLL VENOUS BLD VENIPUNCTURE: CPT

## 2025-03-17 PROCEDURE — 96372 THER/PROPH/DIAG INJ SC/IM: CPT

## 2025-03-17 RX ORDER — ENOXAPARIN SODIUM 100 MG/ML
40 INJECTION SUBCUTANEOUS DAILY
Status: DISCONTINUED | OUTPATIENT
Start: 2025-03-17 | End: 2025-03-25 | Stop reason: HOSPADM

## 2025-03-17 RX ORDER — FINASTERIDE 5 MG/1
5 TABLET, FILM COATED ORAL DAILY
Status: DISCONTINUED | OUTPATIENT
Start: 2025-03-17 | End: 2025-03-25 | Stop reason: HOSPADM

## 2025-03-17 RX ORDER — ACETAMINOPHEN 650 MG/1
650 SUPPOSITORY RECTAL EVERY 6 HOURS PRN
Status: DISCONTINUED | OUTPATIENT
Start: 2025-03-17 | End: 2025-03-25 | Stop reason: HOSPADM

## 2025-03-17 RX ORDER — BUPROPION HYDROCHLORIDE 300 MG/1
300 TABLET ORAL EVERY MORNING
Status: DISCONTINUED | OUTPATIENT
Start: 2025-03-18 | End: 2025-03-25 | Stop reason: HOSPADM

## 2025-03-17 RX ORDER — POLYETHYLENE GLYCOL 3350 17 G/17G
17 POWDER, FOR SOLUTION ORAL DAILY PRN
Status: DISCONTINUED | OUTPATIENT
Start: 2025-03-17 | End: 2025-03-25 | Stop reason: HOSPADM

## 2025-03-17 RX ORDER — POTASSIUM CHLORIDE 7.45 MG/ML
10 INJECTION INTRAVENOUS PRN
Status: DISCONTINUED | OUTPATIENT
Start: 2025-03-17 | End: 2025-03-25 | Stop reason: HOSPADM

## 2025-03-17 RX ORDER — ONDANSETRON 4 MG/1
4 TABLET, ORALLY DISINTEGRATING ORAL EVERY 8 HOURS PRN
Status: DISCONTINUED | OUTPATIENT
Start: 2025-03-17 | End: 2025-03-25 | Stop reason: HOSPADM

## 2025-03-17 RX ORDER — PANTOPRAZOLE SODIUM 40 MG/1
40 TABLET, DELAYED RELEASE ORAL
Status: DISCONTINUED | OUTPATIENT
Start: 2025-03-18 | End: 2025-03-25 | Stop reason: HOSPADM

## 2025-03-17 RX ORDER — SODIUM CHLORIDE 0.9 % (FLUSH) 0.9 %
5-40 SYRINGE (ML) INJECTION PRN
Status: DISCONTINUED | OUTPATIENT
Start: 2025-03-17 | End: 2025-03-25 | Stop reason: HOSPADM

## 2025-03-17 RX ORDER — DEXTROSE MONOHYDRATE 100 MG/ML
INJECTION, SOLUTION INTRAVENOUS CONTINUOUS PRN
Status: DISCONTINUED | OUTPATIENT
Start: 2025-03-17 | End: 2025-03-25 | Stop reason: HOSPADM

## 2025-03-17 RX ORDER — GLUCAGON 1 MG/ML
1 KIT INJECTION PRN
Status: DISCONTINUED | OUTPATIENT
Start: 2025-03-17 | End: 2025-03-20 | Stop reason: SDUPTHER

## 2025-03-17 RX ORDER — SODIUM CHLORIDE 9 MG/ML
INJECTION, SOLUTION INTRAVENOUS PRN
Status: DISCONTINUED | OUTPATIENT
Start: 2025-03-17 | End: 2025-03-25 | Stop reason: HOSPADM

## 2025-03-17 RX ORDER — ACETAMINOPHEN 325 MG/1
650 TABLET ORAL EVERY 6 HOURS PRN
Status: DISCONTINUED | OUTPATIENT
Start: 2025-03-17 | End: 2025-03-25 | Stop reason: HOSPADM

## 2025-03-17 RX ORDER — ASPIRIN 81 MG/1
81 TABLET, CHEWABLE ORAL DAILY
Status: DISCONTINUED | OUTPATIENT
Start: 2025-03-17 | End: 2025-03-25 | Stop reason: HOSPADM

## 2025-03-17 RX ORDER — ONDANSETRON 2 MG/ML
4 INJECTION INTRAMUSCULAR; INTRAVENOUS EVERY 6 HOURS PRN
Status: DISCONTINUED | OUTPATIENT
Start: 2025-03-17 | End: 2025-03-25 | Stop reason: HOSPADM

## 2025-03-17 RX ORDER — GLUCAGON 1 MG/ML
1 KIT INJECTION PRN
Status: DISCONTINUED | OUTPATIENT
Start: 2025-03-17 | End: 2025-03-25 | Stop reason: HOSPADM

## 2025-03-17 RX ORDER — HYDROXYZINE HYDROCHLORIDE 10 MG/1
10 TABLET, FILM COATED ORAL 3 TIMES DAILY PRN
Status: DISCONTINUED | OUTPATIENT
Start: 2025-03-17 | End: 2025-03-25 | Stop reason: HOSPADM

## 2025-03-17 RX ORDER — DEXTROSE MONOHYDRATE 100 MG/ML
INJECTION, SOLUTION INTRAVENOUS CONTINUOUS PRN
Status: DISCONTINUED | OUTPATIENT
Start: 2025-03-17 | End: 2025-03-20 | Stop reason: SDUPTHER

## 2025-03-17 RX ORDER — POTASSIUM CHLORIDE 1500 MG/1
40 TABLET, EXTENDED RELEASE ORAL PRN
Status: DISCONTINUED | OUTPATIENT
Start: 2025-03-17 | End: 2025-03-25 | Stop reason: HOSPADM

## 2025-03-17 RX ORDER — SODIUM CHLORIDE 0.9 % (FLUSH) 0.9 %
5-40 SYRINGE (ML) INJECTION EVERY 12 HOURS SCHEDULED
Status: DISCONTINUED | OUTPATIENT
Start: 2025-03-17 | End: 2025-03-25 | Stop reason: HOSPADM

## 2025-03-17 RX ORDER — KETOROLAC TROMETHAMINE 30 MG/ML
15 INJECTION, SOLUTION INTRAMUSCULAR; INTRAVENOUS ONCE
Status: COMPLETED | OUTPATIENT
Start: 2025-03-17 | End: 2025-03-17

## 2025-03-17 RX ORDER — OXYBUTYNIN CHLORIDE 5 MG/1
5 TABLET ORAL DAILY
Status: ON HOLD | COMMUNITY
End: 2025-03-18

## 2025-03-17 RX ORDER — MULTIVITAMIN WITH IRON
1 TABLET ORAL DAILY
Status: DISCONTINUED | OUTPATIENT
Start: 2025-03-17 | End: 2025-03-25 | Stop reason: HOSPADM

## 2025-03-17 RX ORDER — INSULIN LISPRO 100 [IU]/ML
0-4 INJECTION, SOLUTION INTRAVENOUS; SUBCUTANEOUS
Status: DISCONTINUED | OUTPATIENT
Start: 2025-03-17 | End: 2025-03-25 | Stop reason: HOSPADM

## 2025-03-17 RX ORDER — ATORVASTATIN CALCIUM 80 MG/1
80 TABLET, FILM COATED ORAL DAILY
Status: DISCONTINUED | OUTPATIENT
Start: 2025-03-17 | End: 2025-03-25 | Stop reason: HOSPADM

## 2025-03-17 RX ORDER — FERROUS SULFATE 325(65) MG
325 TABLET ORAL
Status: DISCONTINUED | OUTPATIENT
Start: 2025-03-18 | End: 2025-03-17

## 2025-03-17 RX ORDER — MAGNESIUM SULFATE IN WATER 40 MG/ML
2000 INJECTION, SOLUTION INTRAVENOUS PRN
Status: DISCONTINUED | OUTPATIENT
Start: 2025-03-17 | End: 2025-03-25 | Stop reason: HOSPADM

## 2025-03-17 RX ADMIN — KETOROLAC TROMETHAMINE 15 MG: 30 INJECTION, SOLUTION INTRAMUSCULAR at 21:03

## 2025-03-17 RX ADMIN — SODIUM CHLORIDE, PRESERVATIVE FREE 10 ML: 5 INJECTION INTRAVENOUS at 20:34

## 2025-03-17 RX ADMIN — MICONAZOLE NITRATE: 2 POWDER TOPICAL at 17:03

## 2025-03-17 RX ADMIN — ENOXAPARIN SODIUM 40 MG: 100 INJECTION SUBCUTANEOUS at 17:03

## 2025-03-17 ASSESSMENT — PAIN DESCRIPTION - LOCATION
LOCATION: TOE (COMMENT WHICH ONE)
LOCATION: TOE (COMMENT WHICH ONE)

## 2025-03-17 ASSESSMENT — PAIN DESCRIPTION - DESCRIPTORS
DESCRIPTORS: ACHING
DESCRIPTORS: ACHING

## 2025-03-17 ASSESSMENT — PAIN DESCRIPTION - ORIENTATION
ORIENTATION: RIGHT
ORIENTATION: RIGHT

## 2025-03-17 ASSESSMENT — PAIN - FUNCTIONAL ASSESSMENT: PAIN_FUNCTIONAL_ASSESSMENT: NONE - DENIES PAIN

## 2025-03-17 ASSESSMENT — PAIN SCALES - GENERAL
PAINLEVEL_OUTOF10: 6
PAINLEVEL_OUTOF10: 6

## 2025-03-17 NOTE — ED NOTES
ED to inpatient nurses report      Chief Complaint:  Chief Complaint   Patient presents with    Fall    Nursing Home Placement     Present to ED from: home    MOA:     LOC: alert and orientated to name, place, date  Mobility: Requires assistance * 1  Oxygen Baseline: 95% on room air    Current needs required: none    Code Status:   Prior    What abnormal results were found and what did you give/do to treat them?   None   Any procedures or intervention occur?   None    Mental Status:  Level of Consciousness: Alert (0)    Psych Assessment:        Vitals:  Patient Vitals for the past 24 hrs:   BP Temp Temp src Pulse Resp SpO2 Weight   03/17/25 1540 137/69 -- -- 65 18 95 % --   03/17/25 1338 122/64 97.8 °F (36.6 °C) Oral 87 18 95 % 73 kg (161 lb)        LDAs:      Ambulatory Status:  No data recorded    Diagnosis:  DISPOSITION Admitted 03/17/2025 03:45:26 PM   Final diagnoses:   Multiple falls   Encounter for examination for admission to nursing home        Consults:  IP CONSULT TO CASE MANAGEMENT  IP CONSULT TO SOCIAL WORK     Pain Score:  Pain Assessment  Pain Assessment: None - Denies Pain    C-SSRS:        Sepsis Screening:       [place] Fall Risk:       Swallow Screening        Preferred Language:   English      ALLERGIES     Patient has no known allergies.    SURGICAL HISTORY       Past Surgical History:   Procedure Laterality Date    COLONOSCOPY      UPPER GASTROINTESTINAL ENDOSCOPY         PAST MEDICAL HISTORY       Past Medical History:   Diagnosis Date    Diabetes mellitus (HCC)     Hyperlipidemia            Electronically signed by Alise Hernandez RN on 3/17/2025 at 3:53 PM

## 2025-03-17 NOTE — H&P
History & Physical    Patient:  Rajat Haywood  YOB: 1950  Date of Service: 3/17/2025  MRN: [id number]   Acct:  [id number]   Primary Care Physician: No primary care provider on file.    Chief Complaint:Frequent falls    Assessment / Plan:    Frequent falls, bilateral leg weakness. Labs unremarkable in the ED. EKG NSR with no ischemic concerns. UA pending collection. Obtain CT of the lumbar spine. PT/OT to evaluate and treat.  consult for possible placement.   Diabetic wound to right foot, Obtain Xray. Wound RN consulted. Consider podiatry.   Pressure ulcer to coccyx. Off load. Protective cream PRN.  Type 2 diabetes with neuropathy. Hold home oral medications. Check A1c. Trend glucose ac/hs. Use group 1 Humalog scale. Carb control meals. Hypoglycemic protocol.   Hyperlipidemia, resume statin.  GERD, PPI.  BPH. Proscar.   Depression. Wellbutrin.   Hx CARMELLA, resume Ferrous Sulfate.     History of Present Illness:   History obtained from chart review and the patient.    The patient is a 75 y.o. male who presents with frequent falls and bilateral leg weakness. Complaints have been ongoing. Looking into assistive living facilities. Fell twice today, three times yesterday. States his legs are weak and they give out. Right is weaker than left. No acute injury reported. Does have multiple stages of ecchymosis and abrasions on lower extremities. Presents with inability to care for self. Agreeable to AL or SNF.       Past Medical History:        Diagnosis Date    Diabetes mellitus (HCC)     Hyperlipidemia        Past Surgical History:        Procedure Laterality Date    COLONOSCOPY      UPPER GASTROINTESTINAL ENDOSCOPY         Home Medications:   No current facility-administered medications on file prior to encounter.     Current Outpatient Medications on File Prior to Encounter   Medication Sig Dispense Refill    hydrOXYzine (ATARAX) 10 MG tablet Take 10 mg by mouth 3 times daily as needed for

## 2025-03-17 NOTE — ED PROVIDER NOTES
[facility] EMERGENCY DEPT      Pt Name: Rajat Haywood  MRN: [id number]  Birthdate 1950  Date of evaluation: 3/17/2025  Provider: Diana Coyle PA-C    CHIEF COMPLAINT       Chief Complaint   Patient presents with    Fall    Nursing Home Placement       Nurses Notes reviewed and I agree except as noted in the HPI.      HISTORY OF PRESENT ILLNESS    Rajat Haywood is a 75 y.o. male with PMHx of diabetes mellitus who presents for a fall. He states that at approximately 11 am he fell in his bathroom and hit his \"whole back\". He had to call 911 due to inability to get up, and states that the fire department came and helped him up. The patient then states that he then fell again at approximately 12:30-1 pm in his front yard where his [facility]  helped him and recommended evaluation at the emergency department. He states that during both falls his \"legs gave out\" and that he has had increasing frequency of falls in the past 3 months. He endorses peripheral neuropathy in bilateral legs and feet and states that he wears a right knee brace full time because his \"knee gives out\". He states he has [facility], who [facility] him the [facility], that that the PT no longer works there. He denies injury during either fall, LOC, trauma to his head, or any subsequent pain. He denies antecedent headache, dizziness, light-headedness, chest pain, or shortness of breath.  Patient reports he falls multiple times per week however not multiple times in a day.  He feels he can no longer care for himself at home.    Location/Symptom: Fall. No current pain/symptoms.   Timing/Onset: 11 am and 12:30 pm   Context/Setting: Post-fall in bathroom and front yard.  Quality: N/a  Duration: Increasing duration of falls in the past 3 months (multiple a week)  Modifying Factors: Increasing amount of falls with prolonged standing and walking periods.   Severity: 0/10    REVIEW OF SYSTEMS     Review of Systems

## 2025-03-17 NOTE — ED NOTES
Pt to ER after experiencing multiple falls over the past month. It is reported that patient had fallen twice today and had to call the fire department for assistance to get up. States it feels like whenever he does fall, his legs just give out as if his brain is not connecting with his legs. [facility] nurse who has been seeing patient for years states she has been seeing a steady decline in his mentation over the last month as well. He is currently living alone. It is reported that the patient was in contact with [facility] living in attempted to get established there, but he missed his appointment today.

## 2025-03-17 NOTE — CARE COORDINATION
If in need of additional information on patient or benefits please contact [facility]  Hemalatha Mendez RN, MSN cell [phone], fax [phone] or email [email or web address].    According to nurse SONDRA Carrero called inquiring on patient status as he was to go facility today to tour and possibly admit.

## 2025-03-18 ENCOUNTER — APPOINTMENT (OUTPATIENT)
Dept: MRI IMAGING | Age: 75
DRG: 884 | End: 2025-03-18
Payer: OTHER GOVERNMENT

## 2025-03-18 LAB
ALBUMIN SERPL BCG-MCNC: 3.2 G/DL (ref 3.4–4.9)
ALP SERPL-CCNC: 119 U/L (ref 40–129)
ALT SERPL W/O P-5'-P-CCNC: 13 U/L (ref 10–50)
ANION GAP SERPL CALC-SCNC: 12 MEQ/L (ref 8–16)
AST SERPL-CCNC: 19 U/L (ref 10–50)
BACTERIA URNS QL MICRO: ABNORMAL /HPF
BASOPHILS ABSOLUTE: 0.1 THOU/MM3 (ref 0–0.1)
BASOPHILS NFR BLD AUTO: 0.8 %
BILIRUB SERPL-MCNC: 0.8 MG/DL (ref 0.3–1.2)
BILIRUB UR QL STRIP.AUTO: ABNORMAL
BUN SERPL-MCNC: 23 MG/DL (ref 8–23)
CALCIUM SERPL-MCNC: 8.7 MG/DL (ref 8.8–10.2)
CASTS #/AREA URNS LPF: ABNORMAL /LPF
CASTS 2: ABNORMAL /LPF
CHARACTER UR: CLEAR
CHLORIDE SERPL-SCNC: 104 MEQ/L (ref 98–111)
CO2 SERPL-SCNC: 21 MEQ/L (ref 22–29)
COLOR, UA: ABNORMAL
CREAT SERPL-MCNC: 0.8 MG/DL (ref 0.7–1.2)
CRYSTALS URNS MICRO: ABNORMAL
DEPRECATED MEAN GLUCOSE BLD GHB EST-ACNC: 126 MG/DL (ref 70–126)
DEPRECATED RDW RBC AUTO: 51.1 FL (ref 35–45)
EKG ATRIAL RATE: 88 BPM
EKG P AXIS: -7 DEGREES
EKG P-R INTERVAL: 152 MS
EKG Q-T INTERVAL: 348 MS
EKG QRS DURATION: 68 MS
EKG QTC CALCULATION (BAZETT): 421 MS
EKG R AXIS: 28 DEGREES
EKG T AXIS: -6 DEGREES
EKG VENTRICULAR RATE: 88 BPM
EOSINOPHIL NFR BLD AUTO: 1.9 %
EOSINOPHILS ABSOLUTE: 0.1 THOU/MM3 (ref 0–0.4)
EPITHELIAL CELLS, UA: ABNORMAL /HPF
ERYTHROCYTE [DISTWIDTH] IN BLOOD BY AUTOMATED COUNT: 18.3 % (ref 11.5–14.5)
GFR SERPL CREATININE-BSD FRML MDRD: > 90 ML/MIN/1.73M2
GLUCOSE BLD STRIP.AUTO-MCNC: 102 MG/DL (ref 70–108)
GLUCOSE BLD STRIP.AUTO-MCNC: 140 MG/DL (ref 70–108)
GLUCOSE BLD STRIP.AUTO-MCNC: 146 MG/DL (ref 70–108)
GLUCOSE BLD STRIP.AUTO-MCNC: 80 MG/DL (ref 70–108)
GLUCOSE SERPL-MCNC: 104 MG/DL (ref 74–109)
GLUCOSE UR QL STRIP.AUTO: NEGATIVE MG/DL
HBA1C MFR BLD HPLC: 6.2 % (ref 4–6)
HCT VFR BLD AUTO: 55.1 % (ref 42–52)
HGB BLD-MCNC: 17.4 GM/DL (ref 14–18)
HGB UR QL STRIP.AUTO: NEGATIVE
IMM GRANULOCYTES # BLD AUTO: 0.03 THOU/MM3 (ref 0–0.07)
IMM GRANULOCYTES NFR BLD AUTO: 0.4 %
KETONES UR QL STRIP.AUTO: ABNORMAL
LYMPHOCYTES ABSOLUTE: 0.7 THOU/MM3 (ref 1–4.8)
LYMPHOCYTES NFR BLD AUTO: 9.7 %
MCH RBC QN AUTO: 26.6 PG (ref 26–33)
MCHC RBC AUTO-ENTMCNC: 31.6 GM/DL (ref 32.2–35.5)
MCV RBC AUTO: 84.3 FL (ref 80–94)
MISCELLANEOUS 2: ABNORMAL
MONOCYTES ABSOLUTE: 0.4 THOU/MM3 (ref 0.4–1.3)
MONOCYTES NFR BLD AUTO: 4.9 %
NEUTROPHILS ABSOLUTE: 6.3 THOU/MM3 (ref 1.8–7.7)
NEUTROPHILS NFR BLD AUTO: 82.3 %
NITRITE UR QL STRIP: NEGATIVE
NRBC BLD AUTO-RTO: 0 /100 WBC
PH UR STRIP.AUTO: 6 [PH] (ref 5–9)
PLATELET # BLD AUTO: 280 THOU/MM3 (ref 130–400)
PMV BLD AUTO: 9.1 FL (ref 9.4–12.4)
POTASSIUM SERPL-SCNC: 4.2 MEQ/L (ref 3.5–5.2)
PROT SERPL-MCNC: 6.1 G/DL (ref 6.4–8.3)
PROT UR STRIP.AUTO-MCNC: NEGATIVE MG/DL
RBC # BLD AUTO: 6.54 MILL/MM3 (ref 4.7–6.1)
RBC URINE: ABNORMAL /HPF
RENAL EPI CELLS #/AREA URNS HPF: ABNORMAL /[HPF]
SODIUM SERPL-SCNC: 137 MEQ/L (ref 135–145)
SP GR UR REFRACT.AUTO: 1.03 (ref 1–1.03)
UROBILINOGEN, URINE: 4 EU/DL (ref 0–1)
WBC # BLD AUTO: 7.7 THOU/MM3 (ref 4.8–10.8)
WBC #/AREA URNS HPF: ABNORMAL /HPF
WBC #/AREA URNS HPF: ABNORMAL /[HPF]
YEAST LIKE FUNGI URNS QL MICRO: ABNORMAL

## 2025-03-18 PROCEDURE — 82948 REAGENT STRIP/BLOOD GLUCOSE: CPT

## 2025-03-18 PROCEDURE — 96376 TX/PRO/DX INJ SAME DRUG ADON: CPT

## 2025-03-18 PROCEDURE — 96372 THER/PROPH/DIAG INJ SC/IM: CPT

## 2025-03-18 PROCEDURE — 6360000002 HC RX W HCPCS

## 2025-03-18 PROCEDURE — 85025 COMPLETE CBC W/AUTO DIFF WBC: CPT

## 2025-03-18 PROCEDURE — 36415 COLL VENOUS BLD VENIPUNCTURE: CPT

## 2025-03-18 PROCEDURE — 80053 COMPREHEN METABOLIC PANEL: CPT

## 2025-03-18 PROCEDURE — 81001 URINALYSIS AUTO W/SCOPE: CPT

## 2025-03-18 PROCEDURE — 72148 MRI LUMBAR SPINE W/O DYE: CPT

## 2025-03-18 PROCEDURE — 6370000000 HC RX 637 (ALT 250 FOR IP): Performed by: NURSE PRACTITIONER

## 2025-03-18 PROCEDURE — 99233 SBSQ HOSP IP/OBS HIGH 50: CPT | Performed by: NURSE PRACTITIONER

## 2025-03-18 PROCEDURE — 2500000003 HC RX 250 WO HCPCS: Performed by: NURSE PRACTITIONER

## 2025-03-18 PROCEDURE — 6360000002 HC RX W HCPCS: Performed by: NURSE PRACTITIONER

## 2025-03-18 PROCEDURE — 72141 MRI NECK SPINE W/O DYE: CPT

## 2025-03-18 PROCEDURE — G0378 HOSPITAL OBSERVATION PER HR: HCPCS

## 2025-03-18 PROCEDURE — 93010 ELECTROCARDIOGRAM REPORT: CPT | Performed by: INTERNAL MEDICINE

## 2025-03-18 PROCEDURE — 72146 MRI CHEST SPINE W/O DYE: CPT

## 2025-03-18 PROCEDURE — 83036 HEMOGLOBIN GLYCOSYLATED A1C: CPT

## 2025-03-18 RX ORDER — LISINOPRIL 20 MG/1
20 TABLET ORAL DAILY
Status: DISCONTINUED | OUTPATIENT
Start: 2025-03-18 | End: 2025-03-25 | Stop reason: HOSPADM

## 2025-03-18 RX ORDER — KETOROLAC TROMETHAMINE 30 MG/ML
15 INJECTION, SOLUTION INTRAMUSCULAR; INTRAVENOUS ONCE
Status: COMPLETED | OUTPATIENT
Start: 2025-03-18 | End: 2025-03-18

## 2025-03-18 RX ORDER — LISINOPRIL 20 MG/1
20 TABLET ORAL DAILY
COMMUNITY

## 2025-03-18 RX ORDER — DOXAZOSIN 4 MG/1
4 TABLET ORAL NIGHTLY
Status: DISCONTINUED | OUTPATIENT
Start: 2025-03-18 | End: 2025-03-25 | Stop reason: HOSPADM

## 2025-03-18 RX ORDER — OXYBUTYNIN CHLORIDE 10 MG/1
10 TABLET, EXTENDED RELEASE ORAL DAILY
COMMUNITY

## 2025-03-18 RX ORDER — TERAZOSIN 2 MG/1
2 CAPSULE ORAL NIGHTLY
Status: ON HOLD | COMMUNITY
End: 2025-03-25 | Stop reason: HOSPADM

## 2025-03-18 RX ADMIN — ACETAMINOPHEN 650 MG: 325 TABLET ORAL at 03:25

## 2025-03-18 RX ADMIN — ASPIRIN 81 MG: 81 TABLET, CHEWABLE ORAL at 08:19

## 2025-03-18 RX ADMIN — SODIUM CHLORIDE, PRESERVATIVE FREE 10 ML: 5 INJECTION INTRAVENOUS at 20:22

## 2025-03-18 RX ADMIN — ACETAMINOPHEN 650 MG: 325 TABLET ORAL at 15:35

## 2025-03-18 RX ADMIN — KETOROLAC TROMETHAMINE 15 MG: 30 INJECTION, SOLUTION INTRAMUSCULAR at 01:24

## 2025-03-18 RX ADMIN — ACETAMINOPHEN 650 MG: 325 TABLET ORAL at 10:14

## 2025-03-18 RX ADMIN — FINASTERIDE 5 MG: 5 TABLET, FILM COATED ORAL at 08:42

## 2025-03-18 RX ADMIN — DOXAZOSIN 4 MG: 4 TABLET ORAL at 20:22

## 2025-03-18 RX ADMIN — PANTOPRAZOLE SODIUM 40 MG: 40 TABLET, DELAYED RELEASE ORAL at 05:38

## 2025-03-18 RX ADMIN — MICONAZOLE NITRATE: 2 POWDER TOPICAL at 20:22

## 2025-03-18 RX ADMIN — LISINOPRIL 20 MG: 20 TABLET ORAL at 15:37

## 2025-03-18 RX ADMIN — Medication 1 TABLET: at 08:42

## 2025-03-18 RX ADMIN — MICONAZOLE NITRATE: 2 POWDER TOPICAL at 11:14

## 2025-03-18 RX ADMIN — BUPROPION HYDROCHLORIDE 300 MG: 300 TABLET, EXTENDED RELEASE ORAL at 11:14

## 2025-03-18 RX ADMIN — ENOXAPARIN SODIUM 40 MG: 100 INJECTION SUBCUTANEOUS at 08:42

## 2025-03-18 RX ADMIN — ATORVASTATIN CALCIUM 80 MG: 80 TABLET, FILM COATED ORAL at 08:42

## 2025-03-18 RX ADMIN — SODIUM CHLORIDE, PRESERVATIVE FREE 10 ML: 5 INJECTION INTRAVENOUS at 08:42

## 2025-03-18 ASSESSMENT — PAIN SCALES - GENERAL
PAINLEVEL_OUTOF10: 7
PAINLEVEL_OUTOF10: 0
PAINLEVEL_OUTOF10: 3
PAINLEVEL_OUTOF10: 4
PAINLEVEL_OUTOF10: 0
PAINLEVEL_OUTOF10: 5

## 2025-03-18 ASSESSMENT — PAIN - FUNCTIONAL ASSESSMENT: PAIN_FUNCTIONAL_ASSESSMENT: ACTIVITIES ARE NOT PREVENTED

## 2025-03-18 ASSESSMENT — PAIN DESCRIPTION - ORIENTATION
ORIENTATION: RIGHT

## 2025-03-18 ASSESSMENT — PAIN DESCRIPTION - DESCRIPTORS
DESCRIPTORS: ACHING

## 2025-03-18 ASSESSMENT — PAIN DESCRIPTION - LOCATION
LOCATION: TOE (COMMENT WHICH ONE)
LOCATION: FOOT
LOCATION: TOE (COMMENT WHICH ONE)
LOCATION: FOOT

## 2025-03-18 NOTE — CARE COORDINATION
Case Management Assessment Initial Evaluation    Date/Time of Evaluation: 3/18/2025 11:35 AM  Assessment Completed by: Coni Robert RN    If patient is discharged prior to next notation, then this note serves as note for discharge by case management.    Patient Name: Rajat Haywood                   YOB: 1950  Diagnosis: Multiple falls [R29.6]  Fall at home, initial encounter [W19.XXXA, Y92.009]  Encounter for examination for admission to nursing home [Z02.2]                   Date / Time: 3/17/2025  1:34 PM  Location: [address] [place] [address]     Patient Admission Status: Observation   Readmission Risk Low 0-14, Mod 15-19), High > 20: No data recorded  Current PCP: Sienna Hurst APRN - CNP  Health Care Decision Makers:   Primary Decision Maker: Ban Yarbrough - Brother/Sister - [phone]    Additional Case Management Notes: Patient presents due to multiple falls at home. Hospitalist attending, Ortho consulted, PT/OT, SS, Wound/Ostomy RN, Lovenox, ISS, prn Tylenol and Zofran, electrolyte replacement protocol, MRI of cervical, lumbar, and thoracic spine, easy chew diet, telemetry, up as tolerated.     Procedures: None    Imaging:   3/17 X-ray of right foot:  Impression:  No radiographic evidence of osteomyelitis.    3/17 CT of Lumbar Spine:  IMPRESSION:  1. Acute anterosuperior endplate fracture of L1. No retropulsion.  2. Multilevel disc bulges and facet hypertrophy, causing areas of neural   foraminal and central canal stenosis.  Patient Goals/Plan/Treatment Preferences: Rajat is from home alone. He is planned for SNF at discharge. SS following.

## 2025-03-18 NOTE — CONSULTS
Inpatient Consultation    Rajat Haywood (1950)  3/18/2025    Reason for Consult:  L1 [facility]  Requesting Physician: Matty Ramirez, LOYDA - CNP     CHIEF COMPLAINT:  Frequent falls    History Obtained From:  patient, electronic medical record    HISTORY OF PRESENT ILLNESS:                The patient is a 75 y.o. male who presents with above chief complaint.  Patient presented to the ED yesterday for complaints of frequent falls. Patient reports he has been falling for years and symptoms have progressively been worsening. He reports feeling off balance/gait instability. He denies back pain, leg pain, numbness/tingling. He does complain of right foot pain with intermittent N/T. He denies neck pain, arm pain, N/T or dexterity issues. Patient reports his legs are weak and just give out. He was admitted with plans for SNF placement. CT lumbar spine showed a L1 fracture. He denies bowel or bladder incontinence or saddle paresthesias.     Past Medical History:        Diagnosis Date    Diabetes mellitus (HCC)     Hyperlipidemia      Past Surgical History:        Procedure Laterality Date    COLONOSCOPY      UPPER GASTROINTESTINAL ENDOSCOPY       Current Medications:   Current Facility-Administered Medications: aspirin chewable tablet 81 mg, 81 mg, Oral, Daily  atorvastatin (LIPITOR) tablet 80 mg, 80 mg, Oral, Daily  buPROPion (WELLBUTRIN XL) extended release tablet 300 mg, 300 mg, Oral, QAM  finasteride (PROSCAR) tablet 5 mg, 5 mg, Oral, Daily  hydrOXYzine HCl (ATARAX) tablet 10 mg, 10 mg, Oral, TID PRN  multivitamin 1 tablet, 1 tablet, Oral, Daily  pantoprazole (PROTONIX) tablet 40 mg, 40 mg, Oral, QAM AC  insulin lispro (HUMALOG,ADMELOG) injection vial 0-4 Units, 0-4 Units, SubCUTAneous, 4x Daily AC & HS  glucose chewable tablet 16 g, 4 tablet, Oral, PRN  dextrose bolus 10% 125 mL, 125 mL, IntraVENous, PRN **OR** dextrose bolus 10% 250 mL, 250 mL, IntraVENous, PRN  glucagon injection 1 mg, 1 mg, SubCUTAneous,

## 2025-03-18 NOTE — CARE COORDINATION
3/18/25, 11:35 AM EDT    DISCHARGE PLANNING EVALUATION       Spoke with patient and he wants an ECF stay for rehab.  [facility]  Spoke with [facility]  Hemalatha.  She stated that [facility] because [facility] were going to [facility] at [facility] for patient to consider assisted living there. Referral made to Mariza at [facility].     Readmission Risk Low 0-14, Mod 15-19), High > 20: No data recorded  Current PCP: Sienna Hurst APRN - CNP  PCP verified by CM? Yes    Patient Orientation: Alert and Oriented    Patient Cognition:    History Provided by: Medical Record, Patient, Other (see comment) ([facility] CM)    Advance Directives:      Code Status: Full Code   Patient's Primary Decision Maker is: Patient Declined (Legal Next of Kin Remains as Decision Maker)    Primary Decision Maker: Ban Yarbrough - Brother/Sister - [phone]     Discharge Planning:    Patient lives with: Alone Type of Home: Apartment  Primary Care Giver: Self  Patient Support Systems include: Friends/Neighbors   Current Financial resources:    Current community resources: Other (Comment) (VA)  Current services prior to admission: [facility]            Current DME:              Type of Home Care services:  None    ADLS  Prior functional level: Independent in ADLs/IADLs  Current functional level: Assistance with the following:, Mobility, Bathing, Dressing, Shopping, Housework, Cooking    Family can provide assistance at DC: No  Would you like Case Management to discuss the discharge plan with any other family members/significant others, and if so, who? No (Patient stated that no one needs updated in family.)  Plans to Return to Present Housing: Yes  Other Identified Issues/Barriers to RETURNING to current housing: patient having frequent falls.  Potential Assistance needed at discharge: Skilled Nursing Facility            Potential DME:    Patient expects to discharge to: Skilled nursing facility  Plan for transportation at

## 2025-03-19 LAB
ANION GAP SERPL CALC-SCNC: 11 MEQ/L (ref 8–16)
BUN SERPL-MCNC: 17 MG/DL (ref 8–23)
CALCIUM SERPL-MCNC: 8.9 MG/DL (ref 8.8–10.2)
CHLORIDE SERPL-SCNC: 104 MEQ/L (ref 98–111)
CO2 SERPL-SCNC: 21 MEQ/L (ref 22–29)
CREAT SERPL-MCNC: 0.7 MG/DL (ref 0.7–1.2)
DEPRECATED RDW RBC AUTO: 50.2 FL (ref 35–45)
ERYTHROCYTE [DISTWIDTH] IN BLOOD BY AUTOMATED COUNT: 18.4 % (ref 11.5–14.5)
GFR SERPL CREATININE-BSD FRML MDRD: > 90 ML/MIN/1.73M2
GLUCOSE BLD STRIP.AUTO-MCNC: 110 MG/DL (ref 70–108)
GLUCOSE BLD STRIP.AUTO-MCNC: 125 MG/DL (ref 70–108)
GLUCOSE BLD STRIP.AUTO-MCNC: 128 MG/DL (ref 70–108)
GLUCOSE BLD STRIP.AUTO-MCNC: 140 MG/DL (ref 70–108)
GLUCOSE SERPL-MCNC: 173 MG/DL (ref 74–109)
HCT VFR BLD AUTO: 56.1 % (ref 42–52)
HGB BLD-MCNC: 18.3 GM/DL (ref 14–18)
MCH RBC QN AUTO: 27.4 PG (ref 26–33)
MCHC RBC AUTO-ENTMCNC: 32.6 GM/DL (ref 32.2–35.5)
MCV RBC AUTO: 83.9 FL (ref 80–94)
PLATELET # BLD AUTO: 282 THOU/MM3 (ref 130–400)
PMV BLD AUTO: 8.7 FL (ref 9.4–12.4)
POTASSIUM SERPL-SCNC: 4.2 MEQ/L (ref 3.5–5.2)
RBC # BLD AUTO: 6.69 MILL/MM3 (ref 4.7–6.1)
SODIUM SERPL-SCNC: 136 MEQ/L (ref 135–145)
WBC # BLD AUTO: 7.9 THOU/MM3 (ref 4.8–10.8)

## 2025-03-19 PROCEDURE — 85027 COMPLETE CBC AUTOMATED: CPT

## 2025-03-19 PROCEDURE — 96372 THER/PROPH/DIAG INJ SC/IM: CPT

## 2025-03-19 PROCEDURE — 97166 OT EVAL MOD COMPLEX 45 MIN: CPT

## 2025-03-19 PROCEDURE — 36415 COLL VENOUS BLD VENIPUNCTURE: CPT

## 2025-03-19 PROCEDURE — 99232 SBSQ HOSP IP/OBS MODERATE 35: CPT

## 2025-03-19 PROCEDURE — 2500000003 HC RX 250 WO HCPCS: Performed by: NURSE PRACTITIONER

## 2025-03-19 PROCEDURE — 6360000002 HC RX W HCPCS: Performed by: NURSE PRACTITIONER

## 2025-03-19 PROCEDURE — G0378 HOSPITAL OBSERVATION PER HR: HCPCS

## 2025-03-19 PROCEDURE — 6370000000 HC RX 637 (ALT 250 FOR IP): Performed by: NURSE PRACTITIONER

## 2025-03-19 PROCEDURE — 80048 BASIC METABOLIC PNL TOTAL CA: CPT

## 2025-03-19 PROCEDURE — 82948 REAGENT STRIP/BLOOD GLUCOSE: CPT

## 2025-03-19 PROCEDURE — 97530 THERAPEUTIC ACTIVITIES: CPT

## 2025-03-19 RX ADMIN — Medication 1 TABLET: at 08:01

## 2025-03-19 RX ADMIN — MICONAZOLE NITRATE: 2 POWDER TOPICAL at 22:17

## 2025-03-19 RX ADMIN — ENOXAPARIN SODIUM 40 MG: 100 INJECTION SUBCUTANEOUS at 08:02

## 2025-03-19 RX ADMIN — SODIUM CHLORIDE, PRESERVATIVE FREE 10 ML: 5 INJECTION INTRAVENOUS at 22:17

## 2025-03-19 RX ADMIN — SODIUM CHLORIDE, PRESERVATIVE FREE 10 ML: 5 INJECTION INTRAVENOUS at 08:02

## 2025-03-19 RX ADMIN — DOXAZOSIN 4 MG: 4 TABLET ORAL at 22:17

## 2025-03-19 RX ADMIN — FINASTERIDE 5 MG: 5 TABLET, FILM COATED ORAL at 08:02

## 2025-03-19 RX ADMIN — PANTOPRAZOLE SODIUM 40 MG: 40 TABLET, DELAYED RELEASE ORAL at 05:01

## 2025-03-19 RX ADMIN — ACETAMINOPHEN 650 MG: 325 TABLET ORAL at 06:14

## 2025-03-19 RX ADMIN — ASPIRIN 81 MG: 81 TABLET, CHEWABLE ORAL at 08:01

## 2025-03-19 RX ADMIN — ACETAMINOPHEN 650 MG: 325 TABLET ORAL at 12:21

## 2025-03-19 RX ADMIN — POLYETHYLENE GLYCOL 3350 17 G: 17 POWDER, FOR SOLUTION ORAL at 06:07

## 2025-03-19 RX ADMIN — BUPROPION HYDROCHLORIDE 300 MG: 300 TABLET, EXTENDED RELEASE ORAL at 08:02

## 2025-03-19 RX ADMIN — ACETAMINOPHEN 650 MG: 325 TABLET ORAL at 18:32

## 2025-03-19 RX ADMIN — ATORVASTATIN CALCIUM 80 MG: 80 TABLET, FILM COATED ORAL at 08:03

## 2025-03-19 RX ADMIN — MICONAZOLE NITRATE: 2 POWDER TOPICAL at 08:03

## 2025-03-19 RX ADMIN — LISINOPRIL 20 MG: 20 TABLET ORAL at 08:01

## 2025-03-19 ASSESSMENT — PAIN DESCRIPTION - LOCATION
LOCATION: TOE (COMMENT WHICH ONE)
LOCATION: FOOT

## 2025-03-19 ASSESSMENT — PAIN DESCRIPTION - ORIENTATION
ORIENTATION: RIGHT

## 2025-03-19 ASSESSMENT — PAIN SCALES - GENERAL
PAINLEVEL_OUTOF10: 5
PAINLEVEL_OUTOF10: 0
PAINLEVEL_OUTOF10: 3
PAINLEVEL_OUTOF10: 3
PAINLEVEL_OUTOF10: 6

## 2025-03-19 ASSESSMENT — PAIN DESCRIPTION - DESCRIPTORS
DESCRIPTORS: ACHING
DESCRIPTORS: SHARP;ACHING

## 2025-03-19 ASSESSMENT — PAIN - FUNCTIONAL ASSESSMENT: PAIN_FUNCTIONAL_ASSESSMENT: ACTIVITIES ARE NOT PREVENTED

## 2025-03-20 LAB
ANION GAP SERPL CALC-SCNC: 10 MEQ/L (ref 8–16)
BUN SERPL-MCNC: 17 MG/DL (ref 8–23)
CALCIUM SERPL-MCNC: 9 MG/DL (ref 8.8–10.2)
CHLORIDE SERPL-SCNC: 106 MEQ/L (ref 98–111)
CO2 SERPL-SCNC: 22 MEQ/L (ref 22–29)
CREAT SERPL-MCNC: 0.6 MG/DL (ref 0.7–1.2)
DEPRECATED RDW RBC AUTO: 51 FL (ref 35–45)
ERYTHROCYTE [DISTWIDTH] IN BLOOD BY AUTOMATED COUNT: 18.1 % (ref 11.5–14.5)
GFR SERPL CREATININE-BSD FRML MDRD: > 90 ML/MIN/1.73M2
GLUCOSE BLD STRIP.AUTO-MCNC: 116 MG/DL (ref 70–108)
GLUCOSE BLD STRIP.AUTO-MCNC: 120 MG/DL (ref 70–108)
GLUCOSE BLD STRIP.AUTO-MCNC: 162 MG/DL (ref 70–108)
GLUCOSE BLD STRIP.AUTO-MCNC: 90 MG/DL (ref 70–108)
GLUCOSE SERPL-MCNC: 140 MG/DL (ref 74–109)
HCT VFR BLD AUTO: 55.8 % (ref 42–52)
HGB BLD-MCNC: 17.4 GM/DL (ref 14–18)
MCH RBC QN AUTO: 26.4 PG (ref 26–33)
MCHC RBC AUTO-ENTMCNC: 31.2 GM/DL (ref 32.2–35.5)
MCV RBC AUTO: 84.8 FL (ref 80–94)
PLATELET # BLD AUTO: 280 THOU/MM3 (ref 130–400)
PMV BLD AUTO: 9.1 FL (ref 9.4–12.4)
POTASSIUM SERPL-SCNC: 4.1 MEQ/L (ref 3.5–5.2)
RBC # BLD AUTO: 6.58 MILL/MM3 (ref 4.7–6.1)
SODIUM SERPL-SCNC: 138 MEQ/L (ref 135–145)
WBC # BLD AUTO: 6.8 THOU/MM3 (ref 4.8–10.8)

## 2025-03-20 PROCEDURE — 97116 GAIT TRAINING THERAPY: CPT

## 2025-03-20 PROCEDURE — 6370000000 HC RX 637 (ALT 250 FOR IP): Performed by: NURSE PRACTITIONER

## 2025-03-20 PROCEDURE — 2500000003 HC RX 250 WO HCPCS: Performed by: NURSE PRACTITIONER

## 2025-03-20 PROCEDURE — 80048 BASIC METABOLIC PNL TOTAL CA: CPT

## 2025-03-20 PROCEDURE — 85027 COMPLETE CBC AUTOMATED: CPT

## 2025-03-20 PROCEDURE — 97162 PT EVAL MOD COMPLEX 30 MIN: CPT

## 2025-03-20 PROCEDURE — 6360000002 HC RX W HCPCS: Performed by: NURSE PRACTITIONER

## 2025-03-20 PROCEDURE — 96372 THER/PROPH/DIAG INJ SC/IM: CPT

## 2025-03-20 PROCEDURE — 36415 COLL VENOUS BLD VENIPUNCTURE: CPT

## 2025-03-20 PROCEDURE — 82948 REAGENT STRIP/BLOOD GLUCOSE: CPT

## 2025-03-20 PROCEDURE — 97110 THERAPEUTIC EXERCISES: CPT

## 2025-03-20 PROCEDURE — 1200000000 HC SEMI PRIVATE

## 2025-03-20 PROCEDURE — 99233 SBSQ HOSP IP/OBS HIGH 50: CPT

## 2025-03-20 RX ADMIN — POLYETHYLENE GLYCOL 3350 17 G: 17 POWDER, FOR SOLUTION ORAL at 10:18

## 2025-03-20 RX ADMIN — SODIUM CHLORIDE, PRESERVATIVE FREE 10 ML: 5 INJECTION INTRAVENOUS at 08:20

## 2025-03-20 RX ADMIN — ACETAMINOPHEN 650 MG: 325 TABLET ORAL at 01:16

## 2025-03-20 RX ADMIN — PANTOPRAZOLE SODIUM 40 MG: 40 TABLET, DELAYED RELEASE ORAL at 06:53

## 2025-03-20 RX ADMIN — ACETAMINOPHEN 650 MG: 325 TABLET ORAL at 14:10

## 2025-03-20 RX ADMIN — Medication 1 TABLET: at 08:20

## 2025-03-20 RX ADMIN — ENOXAPARIN SODIUM 40 MG: 100 INJECTION SUBCUTANEOUS at 08:21

## 2025-03-20 RX ADMIN — DOXAZOSIN 4 MG: 4 TABLET ORAL at 20:23

## 2025-03-20 RX ADMIN — FINASTERIDE 5 MG: 5 TABLET, FILM COATED ORAL at 08:21

## 2025-03-20 RX ADMIN — BUPROPION HYDROCHLORIDE 300 MG: 300 TABLET, EXTENDED RELEASE ORAL at 08:21

## 2025-03-20 RX ADMIN — ATORVASTATIN CALCIUM 80 MG: 80 TABLET, FILM COATED ORAL at 08:21

## 2025-03-20 RX ADMIN — MICONAZOLE NITRATE: 2 POWDER TOPICAL at 20:24

## 2025-03-20 RX ADMIN — SODIUM CHLORIDE, PRESERVATIVE FREE 10 ML: 5 INJECTION INTRAVENOUS at 20:24

## 2025-03-20 RX ADMIN — ACETAMINOPHEN 650 MG: 325 TABLET ORAL at 07:27

## 2025-03-20 RX ADMIN — ASPIRIN 81 MG: 81 TABLET, CHEWABLE ORAL at 08:21

## 2025-03-20 RX ADMIN — ACETAMINOPHEN 650 MG: 325 TABLET ORAL at 20:23

## 2025-03-20 RX ADMIN — LISINOPRIL 20 MG: 20 TABLET ORAL at 08:20

## 2025-03-20 RX ADMIN — MICONAZOLE NITRATE: 2 POWDER TOPICAL at 08:21

## 2025-03-20 ASSESSMENT — PAIN DESCRIPTION - LOCATION
LOCATION: FOOT

## 2025-03-20 ASSESSMENT — PAIN DESCRIPTION - DESCRIPTORS
DESCRIPTORS: CRAMPING
DESCRIPTORS: ACHING;DISCOMFORT
DESCRIPTORS: DISCOMFORT;SORE
DESCRIPTORS: ACHING;DISCOMFORT
DESCRIPTORS: DISCOMFORT;SORE

## 2025-03-20 ASSESSMENT — PAIN DESCRIPTION - ORIENTATION
ORIENTATION: RIGHT
ORIENTATION: RIGHT
ORIENTATION: RIGHT;LOWER
ORIENTATION: RIGHT
ORIENTATION: RIGHT

## 2025-03-20 ASSESSMENT — PAIN SCALES - GENERAL
PAINLEVEL_OUTOF10: 2
PAINLEVEL_OUTOF10: 4
PAINLEVEL_OUTOF10: 6
PAINLEVEL_OUTOF10: 5
PAINLEVEL_OUTOF10: 5
PAINLEVEL_OUTOF10: 6

## 2025-03-20 ASSESSMENT — PAIN - FUNCTIONAL ASSESSMENT
PAIN_FUNCTIONAL_ASSESSMENT: ACTIVITIES ARE NOT PREVENTED
PAIN_FUNCTIONAL_ASSESSMENT: PREVENTS OR INTERFERES SOME ACTIVE ACTIVITIES AND ADLS

## 2025-03-20 NOTE — CARE COORDINATION
3/20/25, 8:41 AM EDT    DISCHARGE PLANNING EVALUATION    Call to Mariza with [facility] to follow up on referral, left a voicemail for a call back.     8:45 AM EDT  Call from Mariza with [facility], they can accept pt, will need a PT note to start precert.     3:04 PM EDT  SW met with pt this afternoon, updated on acceptance to [facility] and waiting on authorization from [facility]. VA SW did visit pt while SW at bedside, she did let SW know pt does have an injection medication due tomorrow, DAVIE gave this information to pt's nurse.

## 2025-03-20 NOTE — DISCHARGE INSTR - COC
Continuity of Care Form    Patient Name: Rajat Haywood   :  1950  MRN:  [id number]    Admit date:  3/17/2025  Discharge date:  3/25/2025      Code Status Order: Full Code   Advance Directives:     Admitting Physician:  Saurabh Singh DO  PCP: Sienna Hurst APRN - CNP    Discharging Nurse: calvin AMARO  Discharging Hospital Unit/Room#: 5K-23/023-A  Discharging Unit Phone Number: [phone]    Emergency Contact:   Extended Emergency Contact Information  Primary Emergency Contact: Ban Yarbrough  Home Phone: [phone]  Relation: Brother/Sister   needed? No    Past Surgical History:  Past Surgical History:   Procedure Laterality Date    COLONOSCOPY      UPPER GASTROINTESTINAL ENDOSCOPY         Immunization History:     There is no immunization history on file for this patient.    Active Problems:  Patient Active Problem List   Diagnosis Code    Fall at home, initial encounter W19.XXXA, Y92.009       Isolation/Infection:   Isolation            No Isolation          Patient Infection Status    None to display              Nurse Assessment:  Last Vital Signs: /71   Pulse 60   Temp 98.5 °F (36.9 °C) (Oral)   Resp 18   Ht 1.702 m (5' 7\")   Wt 66.4 kg (146 lb 6.2 oz)   SpO2 96%   BMI 22.93 kg/m²     Last documented pain score (0-10 scale): Pain Level: 5  Last Weight:   Wt Readings from Last 1 Encounters:   25 66.4 kg (146 lb 6.2 oz)     Mental Status:  oriented, alert, coherent, and logical    IV Access:  - None    Nursing Mobility/ADLs:  Walking   Assisted  Transfer  Assisted  Bathing  Assisted  Dressing  Assisted  Toileting  Assisted  Feeding  Independent  Med Admin  Independent  Med Delivery   whole    Wound Care Documentation and Therapy:  Wound 25 Coccyx Left 3 stage 2 on buttocks (Active)   Dressing Status Clean;Intact;Dry 25   Wound Cleansed Soap and water 25   Dressing/Treatment Open to air 25   Wound Assessment Dry 25 1053   Drainage

## 2025-03-21 ENCOUNTER — APPOINTMENT (OUTPATIENT)
Dept: INTERVENTIONAL RADIOLOGY/VASCULAR | Age: 75
DRG: 884 | End: 2025-03-21
Payer: OTHER GOVERNMENT

## 2025-03-21 LAB
DEPRECATED RDW RBC AUTO: 50.3 FL (ref 35–45)
ECHO BSA: 1.77 M2
ERYTHROCYTE [DISTWIDTH] IN BLOOD BY AUTOMATED COUNT: 18.4 % (ref 11.5–14.5)
GLUCOSE BLD STRIP.AUTO-MCNC: 128 MG/DL (ref 70–108)
GLUCOSE BLD STRIP.AUTO-MCNC: 147 MG/DL (ref 70–108)
GLUCOSE BLD STRIP.AUTO-MCNC: 97 MG/DL (ref 70–108)
HCT VFR BLD AUTO: 55.9 % (ref 42–52)
HGB BLD-MCNC: 18 GM/DL (ref 14–18)
MCH RBC QN AUTO: 26.9 PG (ref 26–33)
MCHC RBC AUTO-ENTMCNC: 32.2 GM/DL (ref 32.2–35.5)
MCV RBC AUTO: 83.4 FL (ref 80–94)
PATHOLOGIST REVIEW: ABNORMAL
PLATELET # BLD AUTO: 305 THOU/MM3 (ref 130–400)
PMV BLD AUTO: 9.1 FL (ref 9.4–12.4)
RBC # BLD AUTO: 6.7 MILL/MM3 (ref 4.7–6.1)
SCAN OF BLOOD SMEAR: NORMAL
WBC # BLD AUTO: 6.9 THOU/MM3 (ref 4.8–10.8)

## 2025-03-21 PROCEDURE — 97110 THERAPEUTIC EXERCISES: CPT

## 2025-03-21 PROCEDURE — 93925 LOWER EXTREMITY STUDY: CPT

## 2025-03-21 PROCEDURE — 97530 THERAPEUTIC ACTIVITIES: CPT

## 2025-03-21 PROCEDURE — 2500000003 HC RX 250 WO HCPCS: Performed by: NURSE PRACTITIONER

## 2025-03-21 PROCEDURE — 6370000000 HC RX 637 (ALT 250 FOR IP)

## 2025-03-21 PROCEDURE — 6360000002 HC RX W HCPCS: Performed by: NURSE PRACTITIONER

## 2025-03-21 PROCEDURE — 6370000000 HC RX 637 (ALT 250 FOR IP): Performed by: NURSE PRACTITIONER

## 2025-03-21 PROCEDURE — 85027 COMPLETE CBC AUTOMATED: CPT

## 2025-03-21 PROCEDURE — 82948 REAGENT STRIP/BLOOD GLUCOSE: CPT

## 2025-03-21 PROCEDURE — 36415 COLL VENOUS BLD VENIPUNCTURE: CPT

## 2025-03-21 PROCEDURE — 99232 SBSQ HOSP IP/OBS MODERATE 35: CPT

## 2025-03-21 PROCEDURE — 1200000000 HC SEMI PRIVATE

## 2025-03-21 RX ORDER — PALIPERIDONE 3 MG/1
12 TABLET, EXTENDED RELEASE ORAL DAILY
Status: DISCONTINUED | OUTPATIENT
Start: 2025-03-21 | End: 2025-03-25 | Stop reason: HOSPADM

## 2025-03-21 RX ADMIN — LISINOPRIL 20 MG: 20 TABLET ORAL at 09:12

## 2025-03-21 RX ADMIN — PALIPERIDONE 12 MG: 3 TABLET, EXTENDED RELEASE ORAL at 11:56

## 2025-03-21 RX ADMIN — SODIUM CHLORIDE, PRESERVATIVE FREE 10 ML: 5 INJECTION INTRAVENOUS at 20:44

## 2025-03-21 RX ADMIN — MICONAZOLE NITRATE: 2 POWDER TOPICAL at 20:45

## 2025-03-21 RX ADMIN — SODIUM CHLORIDE, PRESERVATIVE FREE 10 ML: 5 INJECTION INTRAVENOUS at 11:58

## 2025-03-21 RX ADMIN — ENOXAPARIN SODIUM 40 MG: 100 INJECTION SUBCUTANEOUS at 09:11

## 2025-03-21 RX ADMIN — ACETAMINOPHEN 650 MG: 325 TABLET ORAL at 20:45

## 2025-03-21 RX ADMIN — ASPIRIN 81 MG: 81 TABLET, CHEWABLE ORAL at 09:14

## 2025-03-21 RX ADMIN — BUPROPION HYDROCHLORIDE 300 MG: 300 TABLET, EXTENDED RELEASE ORAL at 09:11

## 2025-03-21 RX ADMIN — ACETAMINOPHEN 650 MG: 325 TABLET ORAL at 09:09

## 2025-03-21 RX ADMIN — PANTOPRAZOLE SODIUM 40 MG: 40 TABLET, DELAYED RELEASE ORAL at 06:49

## 2025-03-21 RX ADMIN — DOXAZOSIN 4 MG: 4 TABLET ORAL at 20:45

## 2025-03-21 RX ADMIN — ATORVASTATIN CALCIUM 80 MG: 80 TABLET, FILM COATED ORAL at 09:10

## 2025-03-21 RX ADMIN — MICONAZOLE NITRATE: 2 POWDER TOPICAL at 09:13

## 2025-03-21 RX ADMIN — Medication 1 TABLET: at 09:13

## 2025-03-21 RX ADMIN — ACETAMINOPHEN 650 MG: 325 TABLET ORAL at 15:50

## 2025-03-21 RX ADMIN — FINASTERIDE 5 MG: 5 TABLET, FILM COATED ORAL at 09:11

## 2025-03-21 RX ADMIN — ACETAMINOPHEN 650 MG: 325 TABLET ORAL at 02:27

## 2025-03-21 ASSESSMENT — PAIN DESCRIPTION - ORIENTATION
ORIENTATION: RIGHT

## 2025-03-21 ASSESSMENT — PATIENT HEALTH QUESTIONNAIRE - PHQ9
1. LITTLE INTEREST OR PLEASURE IN DOING THINGS: NOT AT ALL
SUM OF ALL RESPONSES TO PHQ QUESTIONS 1-9: 1
SUM OF ALL RESPONSES TO PHQ QUESTIONS 1-9: 1
2. FEELING DOWN, DEPRESSED OR HOPELESS: SEVERAL DAYS
SUM OF ALL RESPONSES TO PHQ QUESTIONS 1-9: 1
SUM OF ALL RESPONSES TO PHQ QUESTIONS 1-9: 1

## 2025-03-21 ASSESSMENT — PAIN SCALES - GENERAL
PAINLEVEL_OUTOF10: 4
PAINLEVEL_OUTOF10: 4
PAINLEVEL_OUTOF10: 5
PAINLEVEL_OUTOF10: 5
PAINLEVEL_OUTOF10: 4

## 2025-03-21 ASSESSMENT — PAIN DESCRIPTION - LOCATION
LOCATION: FOOT

## 2025-03-21 ASSESSMENT — PAIN DESCRIPTION - DESCRIPTORS
DESCRIPTORS: ACHING
DESCRIPTORS: ACHING;BURNING

## 2025-03-21 ASSESSMENT — LIFESTYLE VARIABLES
HOW MANY STANDARD DRINKS CONTAINING ALCOHOL DO YOU HAVE ON A TYPICAL DAY: PATIENT DOES NOT DRINK
HOW OFTEN DO YOU HAVE A DRINK CONTAINING ALCOHOL: NEVER

## 2025-03-21 NOTE — CONSULTS
[facility]  Podiatric Medicine and Surgery Consultation Note      Rajat Haywood  Medical record number:  [id number]  Age: 75 y.o.   Gender: male  : 1950  Episode date:  3/17/2025    Subjective      History of present illness    Patient is a 75 y.o. male with a history of smoking, diabetes mellitus, hyperlipidemia, peripheral neuropathy, hypertension, GERD,and depression seen bedside today on behalf of Dr Elizabeth. Patient appeared pleasant, was oriented to person, place and time and in no acute distress. Patient states he has had the wound on his second digit for a couple of weeks. Patient states he is not sure how he got the wound. States his foot has been painful recently. States he has not noticed any drainage from the area. States he has not been seeing anyone or treating it. Patient says he smoked in the past for about a 10 year duration and smoked a pack a day. Patient states he does not have much sensation in his feet. Patient denies any N/V/F/C/SOB or CP. No other pedal concerns.              Past medical history        Diagnosis Date    Diabetes mellitus (HCC)     Hyperlipidemia        Past surgical history        Procedure Laterality Date    COLONOSCOPY      UPPER GASTROINTESTINAL ENDOSCOPY         Family history    History reviewed. No pertinent family history.    Social history    Social History     Tobacco Use    Smoking status: Never    Smokeless tobacco: Never   Vaping Use    Vaping status: Never Used   Substance Use Topics    Alcohol use: Never    Drug use: Never       Allergies    No Known Allergies    Medications    No current facility-administered medications on file prior to encounter.     Current Outpatient Medications on File Prior to Encounter   Medication Sig Dispense Refill    lisinopril (PRINIVIL;ZESTRIL) 20 MG tablet Take 1 tablet by mouth daily      oxyBUTYnin (DITROPAN-XL) 10 MG extended release tablet Take 1 tablet by mouth daily      terazosin (HYTRIN) 2 MG

## 2025-03-22 LAB
ANION GAP SERPL CALC-SCNC: 10 MEQ/L (ref 8–16)
BUN SERPL-MCNC: 18 MG/DL (ref 8–23)
CALCIUM SERPL-MCNC: 9 MG/DL (ref 8.8–10.2)
CHLORIDE SERPL-SCNC: 105 MEQ/L (ref 98–111)
CO2 SERPL-SCNC: 22 MEQ/L (ref 22–29)
CREAT SERPL-MCNC: 0.6 MG/DL (ref 0.7–1.2)
DEPRECATED RDW RBC AUTO: 50 FL (ref 35–45)
ERYTHROCYTE [DISTWIDTH] IN BLOOD BY AUTOMATED COUNT: 18.2 % (ref 11.5–14.5)
GFR SERPL CREATININE-BSD FRML MDRD: > 90 ML/MIN/1.73M2
GLUCOSE BLD STRIP.AUTO-MCNC: 119 MG/DL (ref 70–108)
GLUCOSE BLD STRIP.AUTO-MCNC: 123 MG/DL (ref 70–108)
GLUCOSE BLD STRIP.AUTO-MCNC: 140 MG/DL (ref 70–108)
GLUCOSE BLD STRIP.AUTO-MCNC: 154 MG/DL (ref 70–108)
GLUCOSE SERPL-MCNC: 147 MG/DL (ref 74–109)
HCT VFR BLD AUTO: 56.3 % (ref 42–52)
HGB BLD-MCNC: 18.2 GM/DL (ref 14–18)
MCH RBC QN AUTO: 26.8 PG (ref 26–33)
MCHC RBC AUTO-ENTMCNC: 32.3 GM/DL (ref 32.2–35.5)
MCV RBC AUTO: 82.8 FL (ref 80–94)
PLATELET # BLD AUTO: 325 THOU/MM3 (ref 130–400)
PMV BLD AUTO: 9.1 FL (ref 9.4–12.4)
POTASSIUM SERPL-SCNC: 4 MEQ/L (ref 3.5–5.2)
RBC # BLD AUTO: 6.8 MILL/MM3 (ref 4.7–6.1)
SODIUM SERPL-SCNC: 137 MEQ/L (ref 135–145)
WBC # BLD AUTO: 6.4 THOU/MM3 (ref 4.8–10.8)

## 2025-03-22 PROCEDURE — 6360000002 HC RX W HCPCS

## 2025-03-22 PROCEDURE — 82948 REAGENT STRIP/BLOOD GLUCOSE: CPT

## 2025-03-22 PROCEDURE — 2500000003 HC RX 250 WO HCPCS: Performed by: NURSE PRACTITIONER

## 2025-03-22 PROCEDURE — 80048 BASIC METABOLIC PNL TOTAL CA: CPT

## 2025-03-22 PROCEDURE — 36415 COLL VENOUS BLD VENIPUNCTURE: CPT

## 2025-03-22 PROCEDURE — 99232 SBSQ HOSP IP/OBS MODERATE 35: CPT

## 2025-03-22 PROCEDURE — 6370000000 HC RX 637 (ALT 250 FOR IP): Performed by: NURSE PRACTITIONER

## 2025-03-22 PROCEDURE — 6360000002 HC RX W HCPCS: Performed by: NURSE PRACTITIONER

## 2025-03-22 PROCEDURE — 6370000000 HC RX 637 (ALT 250 FOR IP)

## 2025-03-22 PROCEDURE — 85027 COMPLETE CBC AUTOMATED: CPT

## 2025-03-22 PROCEDURE — 1200000000 HC SEMI PRIVATE

## 2025-03-22 RX ORDER — KETOROLAC TROMETHAMINE 30 MG/ML
15 INJECTION, SOLUTION INTRAMUSCULAR; INTRAVENOUS ONCE
Status: COMPLETED | OUTPATIENT
Start: 2025-03-22 | End: 2025-03-22

## 2025-03-22 RX ADMIN — ACETAMINOPHEN 650 MG: 325 TABLET ORAL at 03:40

## 2025-03-22 RX ADMIN — MICONAZOLE NITRATE: 2 POWDER TOPICAL at 20:03

## 2025-03-22 RX ADMIN — DOXAZOSIN 4 MG: 4 TABLET ORAL at 20:03

## 2025-03-22 RX ADMIN — MICONAZOLE NITRATE: 2 POWDER TOPICAL at 09:22

## 2025-03-22 RX ADMIN — ACETAMINOPHEN 650 MG: 325 TABLET ORAL at 21:22

## 2025-03-22 RX ADMIN — Medication 1 TABLET: at 09:21

## 2025-03-22 RX ADMIN — PANTOPRAZOLE SODIUM 40 MG: 40 TABLET, DELAYED RELEASE ORAL at 06:18

## 2025-03-22 RX ADMIN — ENOXAPARIN SODIUM 40 MG: 100 INJECTION SUBCUTANEOUS at 09:22

## 2025-03-22 RX ADMIN — FINASTERIDE 5 MG: 5 TABLET, FILM COATED ORAL at 09:21

## 2025-03-22 RX ADMIN — ATORVASTATIN CALCIUM 80 MG: 80 TABLET, FILM COATED ORAL at 09:21

## 2025-03-22 RX ADMIN — ACETAMINOPHEN 650 MG: 325 TABLET ORAL at 09:19

## 2025-03-22 RX ADMIN — SODIUM CHLORIDE, PRESERVATIVE FREE 10 ML: 5 INJECTION INTRAVENOUS at 09:22

## 2025-03-22 RX ADMIN — ASPIRIN 81 MG: 81 TABLET, CHEWABLE ORAL at 09:21

## 2025-03-22 RX ADMIN — LISINOPRIL 20 MG: 20 TABLET ORAL at 09:22

## 2025-03-22 RX ADMIN — ACETAMINOPHEN 650 MG: 325 TABLET ORAL at 15:24

## 2025-03-22 RX ADMIN — BUPROPION HYDROCHLORIDE 300 MG: 300 TABLET, EXTENDED RELEASE ORAL at 09:21

## 2025-03-22 RX ADMIN — KETOROLAC TROMETHAMINE 15 MG: 30 INJECTION, SOLUTION INTRAMUSCULAR at 23:21

## 2025-03-22 RX ADMIN — SODIUM CHLORIDE, PRESERVATIVE FREE 10 ML: 5 INJECTION INTRAVENOUS at 20:03

## 2025-03-22 RX ADMIN — PALIPERIDONE 12 MG: 3 TABLET, EXTENDED RELEASE ORAL at 09:21

## 2025-03-22 ASSESSMENT — PAIN SCALES - GENERAL
PAINLEVEL_OUTOF10: 4
PAINLEVEL_OUTOF10: 6
PAINLEVEL_OUTOF10: 3
PAINLEVEL_OUTOF10: 5
PAINLEVEL_OUTOF10: 6
PAINLEVEL_OUTOF10: 0
PAINLEVEL_OUTOF10: 3
PAINLEVEL_OUTOF10: 0

## 2025-03-22 ASSESSMENT — PAIN DESCRIPTION - ORIENTATION
ORIENTATION: RIGHT

## 2025-03-22 ASSESSMENT — PAIN DESCRIPTION - DESCRIPTORS
DESCRIPTORS: ACHING;SHARP
DESCRIPTORS: ACHING
DESCRIPTORS: ACHING;SHARP
DESCRIPTORS: ACHING
DESCRIPTORS: ACHING

## 2025-03-22 ASSESSMENT — PAIN DESCRIPTION - LOCATION
LOCATION: FOOT

## 2025-03-22 ASSESSMENT — PAIN - FUNCTIONAL ASSESSMENT
PAIN_FUNCTIONAL_ASSESSMENT: ACTIVITIES ARE NOT PREVENTED
PAIN_FUNCTIONAL_ASSESSMENT: ACTIVITIES ARE NOT PREVENTED
PAIN_FUNCTIONAL_ASSESSMENT: PREVENTS OR INTERFERES SOME ACTIVE ACTIVITIES AND ADLS
PAIN_FUNCTIONAL_ASSESSMENT: ACTIVITIES ARE NOT PREVENTED

## 2025-03-23 LAB
GLUCOSE BLD STRIP.AUTO-MCNC: 122 MG/DL (ref 70–108)
GLUCOSE BLD STRIP.AUTO-MCNC: 140 MG/DL (ref 70–108)
GLUCOSE BLD STRIP.AUTO-MCNC: 159 MG/DL (ref 70–108)
GLUCOSE BLD STRIP.AUTO-MCNC: 175 MG/DL (ref 70–108)

## 2025-03-23 PROCEDURE — 6370000000 HC RX 637 (ALT 250 FOR IP): Performed by: NURSE PRACTITIONER

## 2025-03-23 PROCEDURE — 2500000003 HC RX 250 WO HCPCS: Performed by: NURSE PRACTITIONER

## 2025-03-23 PROCEDURE — 1200000000 HC SEMI PRIVATE

## 2025-03-23 PROCEDURE — 6360000002 HC RX W HCPCS: Performed by: NURSE PRACTITIONER

## 2025-03-23 PROCEDURE — 6370000000 HC RX 637 (ALT 250 FOR IP)

## 2025-03-23 PROCEDURE — 82948 REAGENT STRIP/BLOOD GLUCOSE: CPT

## 2025-03-23 PROCEDURE — 99232 SBSQ HOSP IP/OBS MODERATE 35: CPT

## 2025-03-23 RX ORDER — GABAPENTIN 100 MG/1
100 CAPSULE ORAL 3 TIMES DAILY
Status: DISCONTINUED | OUTPATIENT
Start: 2025-03-23 | End: 2025-03-25 | Stop reason: HOSPADM

## 2025-03-23 RX ORDER — GABAPENTIN 100 MG/1
100 CAPSULE ORAL 3 TIMES DAILY
Status: DISCONTINUED | OUTPATIENT
Start: 2025-03-23 | End: 2025-03-23

## 2025-03-23 RX ADMIN — GABAPENTIN 100 MG: 100 CAPSULE ORAL at 19:59

## 2025-03-23 RX ADMIN — GABAPENTIN 100 MG: 100 CAPSULE ORAL at 15:28

## 2025-03-23 RX ADMIN — BUPROPION HYDROCHLORIDE 300 MG: 300 TABLET, EXTENDED RELEASE ORAL at 08:50

## 2025-03-23 RX ADMIN — Medication 1 TABLET: at 08:50

## 2025-03-23 RX ADMIN — MICONAZOLE NITRATE: 2 POWDER TOPICAL at 08:52

## 2025-03-23 RX ADMIN — ACETAMINOPHEN 650 MG: 325 TABLET ORAL at 08:50

## 2025-03-23 RX ADMIN — SODIUM CHLORIDE, PRESERVATIVE FREE 10 ML: 5 INJECTION INTRAVENOUS at 19:59

## 2025-03-23 RX ADMIN — ENOXAPARIN SODIUM 40 MG: 100 INJECTION SUBCUTANEOUS at 08:52

## 2025-03-23 RX ADMIN — PANTOPRAZOLE SODIUM 40 MG: 40 TABLET, DELAYED RELEASE ORAL at 05:48

## 2025-03-23 RX ADMIN — ATORVASTATIN CALCIUM 80 MG: 80 TABLET, FILM COATED ORAL at 08:51

## 2025-03-23 RX ADMIN — MICONAZOLE NITRATE: 2 POWDER TOPICAL at 19:59

## 2025-03-23 RX ADMIN — PALIPERIDONE 12 MG: 3 TABLET, EXTENDED RELEASE ORAL at 08:50

## 2025-03-23 RX ADMIN — LISINOPRIL 20 MG: 20 TABLET ORAL at 08:51

## 2025-03-23 RX ADMIN — GABAPENTIN 100 MG: 100 CAPSULE ORAL at 08:51

## 2025-03-23 RX ADMIN — DOXAZOSIN 4 MG: 4 TABLET ORAL at 19:58

## 2025-03-23 RX ADMIN — ACETAMINOPHEN 650 MG: 325 TABLET ORAL at 15:28

## 2025-03-23 RX ADMIN — SODIUM CHLORIDE, PRESERVATIVE FREE 10 ML: 5 INJECTION INTRAVENOUS at 08:52

## 2025-03-23 RX ADMIN — FINASTERIDE 5 MG: 5 TABLET, FILM COATED ORAL at 08:51

## 2025-03-23 RX ADMIN — ASPIRIN 81 MG: 81 TABLET, CHEWABLE ORAL at 08:52

## 2025-03-23 RX ADMIN — GABAPENTIN 100 MG: 100 CAPSULE ORAL at 02:00

## 2025-03-23 ASSESSMENT — PAIN - FUNCTIONAL ASSESSMENT
PAIN_FUNCTIONAL_ASSESSMENT: ACTIVITIES ARE NOT PREVENTED
PAIN_FUNCTIONAL_ASSESSMENT: ACTIVITIES ARE NOT PREVENTED

## 2025-03-23 ASSESSMENT — PAIN DESCRIPTION - LOCATION
LOCATION: FOOT
LOCATION: FOOT

## 2025-03-23 ASSESSMENT — PAIN SCALES - GENERAL
PAINLEVEL_OUTOF10: 0
PAINLEVEL_OUTOF10: 3
PAINLEVEL_OUTOF10: 0
PAINLEVEL_OUTOF10: 3
PAINLEVEL_OUTOF10: 0

## 2025-03-23 ASSESSMENT — PAIN DESCRIPTION - DESCRIPTORS
DESCRIPTORS: ACHING
DESCRIPTORS: ACHING

## 2025-03-23 ASSESSMENT — PAIN DESCRIPTION - ORIENTATION
ORIENTATION: RIGHT
ORIENTATION: RIGHT

## 2025-03-24 LAB
DEPRECATED RDW RBC AUTO: 51.8 FL (ref 35–45)
ERYTHROCYTE [DISTWIDTH] IN BLOOD BY AUTOMATED COUNT: 18.4 % (ref 11.5–14.5)
GLUCOSE BLD STRIP.AUTO-MCNC: 109 MG/DL (ref 70–108)
GLUCOSE BLD STRIP.AUTO-MCNC: 117 MG/DL (ref 70–108)
GLUCOSE BLD STRIP.AUTO-MCNC: 133 MG/DL (ref 70–108)
GLUCOSE BLD STRIP.AUTO-MCNC: 135 MG/DL (ref 70–108)
HCT VFR BLD AUTO: 55.8 % (ref 42–52)
HGB BLD-MCNC: 17.4 GM/DL (ref 14–18)
MCH RBC QN AUTO: 26.6 PG (ref 26–33)
MCHC RBC AUTO-ENTMCNC: 31.2 GM/DL (ref 32.2–35.5)
MCV RBC AUTO: 85.2 FL (ref 80–94)
PLATELET # BLD AUTO: 311 THOU/MM3 (ref 130–400)
PMV BLD AUTO: 9.2 FL (ref 9.4–12.4)
RBC # BLD AUTO: 6.55 MILL/MM3 (ref 4.7–6.1)
WBC # BLD AUTO: 7.4 THOU/MM3 (ref 4.8–10.8)

## 2025-03-24 PROCEDURE — 6370000000 HC RX 637 (ALT 250 FOR IP)

## 2025-03-24 PROCEDURE — 99231 SBSQ HOSP IP/OBS SF/LOW 25: CPT

## 2025-03-24 PROCEDURE — 2500000003 HC RX 250 WO HCPCS: Performed by: NURSE PRACTITIONER

## 2025-03-24 PROCEDURE — 85027 COMPLETE CBC AUTOMATED: CPT

## 2025-03-24 PROCEDURE — 36415 COLL VENOUS BLD VENIPUNCTURE: CPT

## 2025-03-24 PROCEDURE — 6370000000 HC RX 637 (ALT 250 FOR IP): Performed by: NURSE PRACTITIONER

## 2025-03-24 PROCEDURE — 82948 REAGENT STRIP/BLOOD GLUCOSE: CPT

## 2025-03-24 PROCEDURE — 97110 THERAPEUTIC EXERCISES: CPT

## 2025-03-24 PROCEDURE — 1200000000 HC SEMI PRIVATE

## 2025-03-24 PROCEDURE — 97116 GAIT TRAINING THERAPY: CPT

## 2025-03-24 PROCEDURE — 6360000002 HC RX W HCPCS: Performed by: NURSE PRACTITIONER

## 2025-03-24 RX ORDER — TRAMADOL HYDROCHLORIDE 50 MG/1
25 TABLET ORAL EVERY 4 HOURS PRN
Status: DISCONTINUED | OUTPATIENT
Start: 2025-03-24 | End: 2025-03-25 | Stop reason: HOSPADM

## 2025-03-24 RX ADMIN — Medication 1 TABLET: at 09:18

## 2025-03-24 RX ADMIN — ACETAMINOPHEN 650 MG: 325 TABLET ORAL at 06:51

## 2025-03-24 RX ADMIN — TRAMADOL HYDROCHLORIDE 25 MG: 50 TABLET, COATED ORAL at 17:39

## 2025-03-24 RX ADMIN — PANTOPRAZOLE SODIUM 40 MG: 40 TABLET, DELAYED RELEASE ORAL at 05:43

## 2025-03-24 RX ADMIN — GABAPENTIN 100 MG: 100 CAPSULE ORAL at 09:18

## 2025-03-24 RX ADMIN — ASPIRIN 81 MG: 81 TABLET, CHEWABLE ORAL at 09:18

## 2025-03-24 RX ADMIN — ACETAMINOPHEN 650 MG: 325 TABLET ORAL at 00:43

## 2025-03-24 RX ADMIN — MICONAZOLE NITRATE: 2 POWDER TOPICAL at 09:18

## 2025-03-24 RX ADMIN — GABAPENTIN 100 MG: 100 CAPSULE ORAL at 20:08

## 2025-03-24 RX ADMIN — ATORVASTATIN CALCIUM 80 MG: 80 TABLET, FILM COATED ORAL at 09:18

## 2025-03-24 RX ADMIN — GABAPENTIN 100 MG: 100 CAPSULE ORAL at 14:25

## 2025-03-24 RX ADMIN — PALIPERIDONE 12 MG: 3 TABLET, EXTENDED RELEASE ORAL at 09:18

## 2025-03-24 RX ADMIN — DOXAZOSIN 4 MG: 4 TABLET ORAL at 20:08

## 2025-03-24 RX ADMIN — LISINOPRIL 20 MG: 20 TABLET ORAL at 09:18

## 2025-03-24 RX ADMIN — SODIUM CHLORIDE, PRESERVATIVE FREE 10 ML: 5 INJECTION INTRAVENOUS at 20:08

## 2025-03-24 RX ADMIN — BUPROPION HYDROCHLORIDE 300 MG: 300 TABLET, EXTENDED RELEASE ORAL at 09:18

## 2025-03-24 RX ADMIN — ENOXAPARIN SODIUM 40 MG: 100 INJECTION SUBCUTANEOUS at 09:18

## 2025-03-24 RX ADMIN — SODIUM CHLORIDE, PRESERVATIVE FREE 10 ML: 5 INJECTION INTRAVENOUS at 09:17

## 2025-03-24 RX ADMIN — FINASTERIDE 5 MG: 5 TABLET, FILM COATED ORAL at 09:18

## 2025-03-24 RX ADMIN — MICONAZOLE NITRATE: 2 POWDER TOPICAL at 20:08

## 2025-03-24 RX ADMIN — ACETAMINOPHEN 650 MG: 325 TABLET ORAL at 16:13

## 2025-03-24 ASSESSMENT — PAIN DESCRIPTION - ORIENTATION
ORIENTATION: RIGHT

## 2025-03-24 ASSESSMENT — PAIN DESCRIPTION - LOCATION
LOCATION: FOOT

## 2025-03-24 ASSESSMENT — PAIN SCALES - GENERAL
PAINLEVEL_OUTOF10: 5
PAINLEVEL_OUTOF10: 5
PAINLEVEL_OUTOF10: 4
PAINLEVEL_OUTOF10: 5
PAINLEVEL_OUTOF10: 7
PAINLEVEL_OUTOF10: 5

## 2025-03-24 ASSESSMENT — PAIN - FUNCTIONAL ASSESSMENT
PAIN_FUNCTIONAL_ASSESSMENT: ACTIVITIES ARE NOT PREVENTED
PAIN_FUNCTIONAL_ASSESSMENT: ACTIVITIES ARE NOT PREVENTED
PAIN_FUNCTIONAL_ASSESSMENT: PREVENTS OR INTERFERES SOME ACTIVE ACTIVITIES AND ADLS
PAIN_FUNCTIONAL_ASSESSMENT: ACTIVITIES ARE NOT PREVENTED
PAIN_FUNCTIONAL_ASSESSMENT: ACTIVITIES ARE NOT PREVENTED

## 2025-03-24 ASSESSMENT — PAIN DESCRIPTION - DESCRIPTORS
DESCRIPTORS: ACHING
DESCRIPTORS: ACHING;SHARP
DESCRIPTORS: ACHING

## 2025-03-24 NOTE — CARE COORDINATION
3/24/25, 8:32 AM EDT    DISCHARGE PLANNING EVALUATION    Call from Idania with [phone] asking for update on when pt will bed discharged. Call back to Idania let her know we are waiting on [facility] approval, she will check into some things and get back with SW on this.     Call to Mariza with [facility], has not heard any updates since Friday and precert was still pending then, she will check into this and get back with SW.

## 2025-03-24 NOTE — CARE COORDINATION
3/24/25, 2:55 PM EDT    DISCHARGE ON GOING EVALUATION    Rajat [facility] day: 4  Location: -23/023-A Reason for admit: Multiple falls [R29.6]  Fall at home, initial encounter [W19.XXXA, Y92.009]  Encounter for examination for admission to nursing home [Z02.2]         Barriers to Discharge: Hospitalist attending, PT/OT, SS, Lovenox, ISS, prn Tylenol and Zofran, easy chew diet, wound care with NSS, up as tolerated.      PCP: Sienna Hurst APRN - CNP  Readmission Risk Score: 15.2    Patient Goals/Plan/Treatment Preferences: Rajat is from home alone. He is planned for [facility]. Pre-cert pending.

## 2025-03-25 VITALS
TEMPERATURE: 97.8 F | HEART RATE: 60 BPM | RESPIRATION RATE: 16 BRPM | BODY MASS INDEX: 22.98 KG/M2 | OXYGEN SATURATION: 98 % | WEIGHT: 146.39 LBS | SYSTOLIC BLOOD PRESSURE: 139 MMHG | HEIGHT: 67 IN | DIASTOLIC BLOOD PRESSURE: 58 MMHG

## 2025-03-25 PROBLEM — S22.080A CLOSED COMPRESSION FRACTURE OF THORACOLUMBAR VERTEBRA (HCC): Status: ACTIVE | Noted: 2025-03-25

## 2025-03-25 PROBLEM — S32.010A CLOSED COMPRESSION FRACTURE OF THORACOLUMBAR VERTEBRA (HCC): Status: ACTIVE | Noted: 2025-03-25

## 2025-03-25 PROBLEM — S91.301A OPEN WOUND OF RIGHT FOOT: Status: ACTIVE | Noted: 2025-03-25

## 2025-03-25 PROBLEM — R53.81 PHYSICAL DECONDITIONING: Status: ACTIVE | Noted: 2025-03-25

## 2025-03-25 PROBLEM — R29.6 FREQUENT FALLS: Status: ACTIVE | Noted: 2025-03-25
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
## 2025-03-25 PROBLEM — L89.159 PRESSURE INJURY OF SKIN OF COCCYGEAL REGION: Status: ACTIVE | Noted: 2025-03-25
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
## 2025-03-25 LAB
GLUCOSE BLD STRIP.AUTO-MCNC: 127 MG/DL (ref 70–108)
GLUCOSE BLD STRIP.AUTO-MCNC: 163 MG/DL (ref 70–108)

## 2025-03-25 PROCEDURE — 6370000000 HC RX 637 (ALT 250 FOR IP)

## 2025-03-25 PROCEDURE — 97110 THERAPEUTIC EXERCISES: CPT

## 2025-03-25 PROCEDURE — 6360000002 HC RX W HCPCS: Performed by: NURSE PRACTITIONER

## 2025-03-25 PROCEDURE — 6370000000 HC RX 637 (ALT 250 FOR IP): Performed by: NURSE PRACTITIONER

## 2025-03-25 PROCEDURE — 99239 HOSP IP/OBS DSCHRG MGMT >30: CPT

## 2025-03-25 PROCEDURE — 82948 REAGENT STRIP/BLOOD GLUCOSE: CPT

## 2025-03-25 RX ORDER — TRAMADOL HYDROCHLORIDE 50 MG/1
25 TABLET ORAL EVERY 4 HOURS PRN
Qty: 5 TABLET | Refills: 0 | Status: SHIPPED | OUTPATIENT
Start: 2025-03-25 | End: 2025-03-31

## 2025-03-25 RX ORDER — PALIPERIDONE 6 MG/1
12 TABLET, EXTENDED RELEASE ORAL DAILY
DISCHARGE
Start: 2025-03-26

## 2025-03-25 RX ORDER — DOXAZOSIN 4 MG/1
4 TABLET ORAL NIGHTLY
DISCHARGE
Start: 2025-03-25

## 2025-03-25 RX ORDER — GABAPENTIN 100 MG/1
100 CAPSULE ORAL 3 TIMES DAILY
DISCHARGE
Start: 2025-03-25 | End: 2025-04-04

## 2025-03-25 RX ADMIN — Medication 1 TABLET: at 09:23

## 2025-03-25 RX ADMIN — LISINOPRIL 20 MG: 20 TABLET ORAL at 09:25

## 2025-03-25 RX ADMIN — PALIPERIDONE 12 MG: 3 TABLET, EXTENDED RELEASE ORAL at 09:25

## 2025-03-25 RX ADMIN — ENOXAPARIN SODIUM 40 MG: 100 INJECTION SUBCUTANEOUS at 09:23

## 2025-03-25 RX ADMIN — ASPIRIN 81 MG: 81 TABLET, CHEWABLE ORAL at 09:23

## 2025-03-25 RX ADMIN — TRAMADOL HYDROCHLORIDE 25 MG: 50 TABLET, COATED ORAL at 09:25

## 2025-03-25 RX ADMIN — MICONAZOLE NITRATE: 2 POWDER TOPICAL at 09:00

## 2025-03-25 RX ADMIN — ACETAMINOPHEN 650 MG: 325 TABLET ORAL at 00:01

## 2025-03-25 RX ADMIN — BUPROPION HYDROCHLORIDE 300 MG: 300 TABLET, EXTENDED RELEASE ORAL at 09:24

## 2025-03-25 RX ADMIN — PANTOPRAZOLE SODIUM 40 MG: 40 TABLET, DELAYED RELEASE ORAL at 05:08

## 2025-03-25 RX ADMIN — TRAMADOL HYDROCHLORIDE 25 MG: 50 TABLET, COATED ORAL at 05:08

## 2025-03-25 RX ADMIN — FINASTERIDE 5 MG: 5 TABLET, FILM COATED ORAL at 09:24

## 2025-03-25 RX ADMIN — GABAPENTIN 100 MG: 100 CAPSULE ORAL at 09:24

## 2025-03-25 RX ADMIN — ATORVASTATIN CALCIUM 80 MG: 80 TABLET, FILM COATED ORAL at 09:24

## 2025-03-25 RX ADMIN — GABAPENTIN 100 MG: 100 CAPSULE ORAL at 13:40

## 2025-03-25 RX ADMIN — TRAMADOL HYDROCHLORIDE 25 MG: 50 TABLET, COATED ORAL at 13:39

## 2025-03-25 ASSESSMENT — PAIN DESCRIPTION - DESCRIPTORS
DESCRIPTORS: ACHING
DESCRIPTORS: ACHING;DISCOMFORT
DESCRIPTORS: ACHING
DESCRIPTORS: ACHING

## 2025-03-25 ASSESSMENT — PAIN - FUNCTIONAL ASSESSMENT: PAIN_FUNCTIONAL_ASSESSMENT: ACTIVITIES ARE NOT PREVENTED

## 2025-03-25 ASSESSMENT — PAIN DESCRIPTION - LOCATION
LOCATION: FOOT

## 2025-03-25 ASSESSMENT — PAIN DESCRIPTION - ORIENTATION
ORIENTATION: RIGHT

## 2025-03-25 ASSESSMENT — PAIN SCALES - GENERAL
PAINLEVEL_OUTOF10: 4
PAINLEVEL_OUTOF10: 7
PAINLEVEL_OUTOF10: 6
PAINLEVEL_OUTOF10: 5

## 2025-03-25 NOTE — PROGRESS NOTES
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 in his mentation over the last month as well. He is currently living alone. It is reported that the patient was in contact with [facility] living in attempted to get established there, but he missed his appointment today.\" MRI Results 1. Acute compression fracture involving T8 with 15% compression. No retropulsion   into the spinal canal or cord compression.   2. Acute compression fracture along the superior endplate of L1 with 30%   compression. No retropulsion into the spinal canal or cord compression.   3. Slight heterogeneity involving the right and left lobes of the thyroid gland. 1. Straightening of the normal cervical lordosis with superimposed degenerative   changes resulting in mild to moderate canal stenosis, cord compression, severe   right and moderate left-sided foraminal stenosis at C5-6.   2. There is mild to moderate canal stenosis, cord compression and moderate to   severe bilateral foraminal stenosis at C4-5.   3. There is mild canal stenosis, indentation upon 
Call[facility] and [facility].  Per [place] unable to give (see IM prog note), will start on PO daily.  [facility] team is aware, and agrees with med change.    
Department of Orthopedic Surgery  Spine Service  Attending Progress Note        Subjective:  MRI C/T/L spine reviewed & discussed with patient    Vitals  VITALS:  BP (!) 151/68   Pulse 58   Temp 99 °F (37.2 °C) (Oral)   Resp 18   Ht 1.702 m (5' 7\")   Wt 66.4 kg (146 lb 6.2 oz)   SpO2 95%   BMI 22.93 kg/m²   24HR INTAKE/OUTPUT:    Intake/Output Summary (Last 24 hours) at 3/19/2025 0639  Last data filed at 3/19/2025 0510  Gross per 24 hour   Intake 1100 ml   Output 400 ml   Net 700 ml     URINARY CATHETER OUTPUT ([place]):     DRAIN/TUBE OUTPUT:       PHYSICAL EXAM:    Orientation:  alert and oriented to person, place and time    Upper Extremity Motor :   Deltoid:  5/5  Biceps:  5/5  Triceps:  5/5  : 5/5      Lower Extremity Motor :  quadriceps, extensor hallucis longus, dorsiflexion, plantarflexion 5/5 bilaterally    LABS:  MRI CERVICAL SPINE  IMPRESSION:        1. Straightening of the normal cervical lordosis with superimposed degenerative  changes resulting in mild to moderate canal stenosis, cord compression, severe  right and moderate left-sided foraminal stenosis at C5-6.  2. There is mild to moderate canal stenosis, cord compression and moderate to  severe bilateral foraminal stenosis at C4-5.  3. There is mild canal stenosis, indentation upon the underlying spinal cord and  moderate bilateral foraminal stenosis at C6/7.  4. There is mild canal stenosis, indentation upon the underlying spinal cord,  moderate to severe right and mild to moderate left-sided foraminal stenosis at  C3-4.  5. There is mild canal stenosis with no definite cord compression, moderate  right and mild left-sided foraminal stenosis at C2-3.  6. There is mild canal stenosis with no cord compression and mild to moderate  bilateral foraminal stenosis at C7-T1.  7. There is an area of increased signal intensity within the cervical spinal  cord behind C6 suggestive of myelomalacia secondary to chronic cord  compression..  MRI LUMBAR 
[facility]  INPATIENT OCCUPATIONAL THERAPY  STRZ ONC MED 5K  EVALUATION      Discharge Recommendations: Subacute/Skilled Nursing Facility, Patient would benefit from continued therapy after discharge, 24 hour supervision or assist  Equipment Recommendations: No        Time In: 1130  Time Out: 1153  Timed Code Treatment Minutes: 15 Minutes  Minutes: 23          Date: 3/19/2025  Patient Name: Rajat Haywood,   Gender: male      MRN: [id number]  : 1950  (75 y.o.)  Referring Practitioner: Matty Ramirez APRN - CNP  Diagnosis: Fall at home, initial encounter  Additional Pertinent Hx: inital H&P: \"Pt to ER after experiencing multiple falls over the past month. It is reported that patient had fallen twice today and had to call the fire department for assistance to get up. States it feels like whenever he does fall, his legs just give out as if his brain is not connecting with his legs. [facility] who has been seeing patient for years states she has been seeing a steady decline in his mentation over the last month as well. He is currently living alone. It is reported that the patient was in contact with [facility] living in attempted to get established there, but he missed his appointment today.\" MRI Results 1. Acute compression fracture involving T8 with 15% compression. No retropulsion   into the spinal canal or cord compression.   2. Acute compression fracture along the superior endplate of L1 with 30%   compression. No retropulsion into the spinal canal or cord compression.   3. Slight heterogeneity involving the right and left lobes of the thyroid gland. 1. Straightening of the normal cervical lordosis with superimposed degenerative   changes resulting in mild to moderate canal stenosis, cord compression, severe   right and moderate left-sided foraminal stenosis at C5-6.   2. There is mild to moderate canal stenosis, cord compression and moderate to   severe bilateral foraminal 
[facility]  STRZ ONC MED 5K  Occupational Therapy  Daily Note    Discharge Recommendations: Subacute/skilled nursing facility  Equipment Recommendations: No      Time In: 0936  Time Out: 0954  Timed Code Treatment Minutes: 18 Minutes  Minutes: 18          Date: 3/25/2025  Patient Name: Rajat Haywood,   Gender: male      Room: [facility]23/023-A  MRN: [id number]  : 1950  (75 y.o.)  Referring Practitioner: Matty Ramirez APRN - CNP  Diagnosis: Fall at home, initial encounter  Additional Pertinent Hx: inital H&P: \"Pt to ER after experiencing multiple falls over the past month. It is reported that patient had fallen twice today and had to call the fire department for assistance to get up. States it feels like whenever he does fall, his legs just give out as if his brain is not connecting with his legs. [facility] nurse who has been seeing patient for years states she has been seeing a steady decline in his mentation over the last month as well. He is currently living alone. It is reported that the patient was in contact with [facility] assisted living in attempted to get established there, but he missed his appointment today.\" MRI Results 1. Acute compression fracture involving T8 with 15% compression. No retropulsion   into the spinal canal or cord compression.   2. Acute compression fracture along the superior endplate of L1 with 30%   compression. No retropulsion into the spinal canal or cord compression.   3. Slight heterogeneity involving the right and left lobes of the thyroid gland. 1. Straightening of the normal cervical lordosis with superimposed degenerative   changes resulting in mild to moderate canal stenosis, cord compression, severe   right and moderate left-sided foraminal stenosis at C5-6.   2. There is mild to moderate canal stenosis, cord compression and moderate to   severe bilateral foraminal stenosis at C4-5.   3. There is mild canal stenosis, indentation upon the underlying spinal 
[facility]  PHYSICAL THERAPY MISSED TREATMENT NOTE  STRZ ONC MED 5K    Date: 3/18/2025  Patient Name: Rajat Haywood        MRN: [id number]   : 1950  (75 y.o.)  Gender: male                REASON FOR MISSED TREATMENT:  PT eval and treat received and pt asked to be a priority for PT for possible placement. Pt CT scan shows 1. Acute anterosuperior endplate fracture of L1. No retropulsion.2. Multilevel disc bulges and facet hypertrophy, causing areas of neural foraminal and central canal stenosis.   Ortho/Neuro Spine consulted. Will hold PT eval until consult completed.            
[facility]ound Ostomy Continence Nurse  Progress Note       Rajat Haywood  AGE: 75 y.o.   GENDER: male  : 1950  UNIT: 5K-23/023-A  TODAY'S DATE:  3/19/2025  ADMISSION DATE: 3/17/2025  1:34 PM    Subjective:       Rajat Haywood is a 75 y.o. male referred by:   [] Physician/ Resident/ PA/ APRN-CNP  [x] Nursing  [] Other:     Summary:      Wound ostomy consult received for right foot inbetween 1st and second digit. Also has a pressure ulcer on coccyx. Documentation reviewed. Staff documenting stage 2, dry to buttocks. No documentation regarding right foot wound. Recommend staff to utilize Sagar and Wound Care order sets. See below. If wound evolves during hospital admission, please call.     Treatment Recommendations:  -Turn every 2 hours and PRN  -Offload with pillows or wedges  -Ensure patient is on low air loss support surface ([place], SPR+, Jo-Ann, Bariatric bed)  -Utilize moisture wicking underpads  -Limit use of depends, except when working with therapy  -If applicable, use waffle cushion when in chair    How to: Sagar and Wound Care Orders         Type WOUND in order set search bar.  Right click Wound Care Orders, select add to favorites.  Right click GEN Sagar Scale Focused, select add to favorites.        Check GEN Sagar Scale Focus and Wound Care Orders.  Select appropriate orders.  Sign orders as Per Protocol: No Cosign Required.   These are independent nursing orders.   
[facility]  OCCUPATIONAL THERAPY MISSED TREATMENT NOTE  STRZ ONC MED 5K  5K-23/023-A      Date: 3/18/2025  Patient Name: Rajat Haywood        CSN: [id number]   : 1950  (75 y.o.)  Gender: male                REASON FOR MISSED TREATMENT:  OT eval and treat received and pt asked to be a priority for OT for possible placement. Pt CT scan shows 1. Acute anterosuperior endplate fracture of L1. No retropulsion.2. Multilevel disc bulges and facet hypertrophy, causing areas of neural foraminal and central canal stenosis.   Ortho/Neuro Spine consulted. Will hold OT eval until consult completed.                Noted in afternoon orthospine consult had been completed although ordered additional MRI for C/T/L spine are placed. Will follow up 3/19 as able    
Paperwork faxed to [facility] - paper copies sent with patient. Discharge education and instructions provided. No questions asked. IV access removed. All personal belongings, AVS  present with patient. Transport to [facility] via [facility]    
Patient blood sugar was 157, no  coverage was needed, patient vitals were taken and were good, patient repositioned and is wating for lunch.  
Patient is laying in bed awake. Reassessment performed, no changes. Dressing change performed at this time with RN. Brief and gown changed. Bed alarm is on and call light is within reach.   
Patient is resting in bed with the TV on. Glucose check was 97. RN notified. Bed alarm is on and call light within reach.   
Patient was bathed, linens changed and room straightened up, patient was repositioned and settling in with a TV show.  
Patient's  Eric called and completed patient's home medication list.  
Pt admitted to  5K23 via from ED from ED.  Complaints: falls.    IV into the forearm right, condition patent and no redness. IV site free of s/s of infection or infiltration. Vital signs obtained. Assessment and data collection initiated. Two nurse skin assessment performed by Janki AMARO and Renuka RN. Oriented to room. Policies and procedures for 5 explained. All questions answered with no further questions at this time. Fall prevention and safety brochure discussed with patient.  Bed alarm on. Call light in reach.Oriented to room.   Denise Earl RN, RN 3/17/2025 4:58 PM     Explained patients right to have family, representative or physician notified of their admission.  Patient has Declined for physician to be notified.  Patient has Declined for family/representative to be notified.   
Report received from RN. Patient is in bed resting with TV on. Physical assessment performed at this time.     Patient is alert and oriented x4, PERRLA. Speech is clear and appropriate for developmental status. Hand grasp is strong bilaterally, with full sensation. Pedal push and pull is strong on the left, and absent on the right. Patient states his right foot is numb but has full sensation otherwise. Respirations are clear and equal bilaterally, diminished breath sounds auscultated in the lower lobes. Heart rate and rhythm are normal, S1 S2 auscultated. Bowel sounds active in all 4 quadrants, abdomen appears soft and flat. Stage 2 pressure wound on coccyx, and a laceration on the right foot between the great toe and 2nd toe. IV in the left forearm, patent and capped.   Patient is left resting in bed with call light within reach. Will continue to monitor.   
Report received from primary RN. Patient is sitting in bed, just finished breakfast and watching TV. Patient is alert and oriented x4, speech clear and appropriate. Pupils are round and active to light, mouth was pink and moist. Upper extremities were brown, warm and dry, no numbness or tingling. Respirations are clear, no coughing or wheezing, with heart rate normal. Abdomen was soft and round, bowels sounds in all quads. Stage 2 pressure wound on coccyx, mepilex bandage clean and in place. Lower extremities were warm brown and dry, numbness in right foot was noted but full sensation in both feet. Wound between right great toe and 2nd toe, wound was dry and intact, gauze was clean and dry. Pedal pulses were strong and present. External urinary cath in place, IV in left posterior arm, dry and intact. Patient is resting with comfort and will continue to monitor.  
Reported off to RN, patient requested tylenol, RN notified.   
SBIRT screening completed per trauma protocol. SBIRT Findings are Negative.  Patient denies alcohol and/or drug use. Also denies depressive symptoms.    Brief Intervention and Referral to Treatment Summary N/A    Patient was provided PHQ-9, AUDIT-C and DAST Screening:      PHQ-9 Score:  1  AUDIT-C Score:  Negative  DAST Score:   Negative    Patient’s substance use is considered     Negative      Patient’s depression is considered:     Minimal    Brief Education Was Provided    Not applicable as results are negative.      Brief Intervention(s) Provided  at time of screening(Only for AUDIT-C or DAST)     Not applicable as results are negative.    Injured Trauma Survivor Screening  1.  When you were injured or right afterward   Did you think you were going to die? RESPONSES; YES+1/NO: NO  Do you think this was done to you intentionally? NO    Since your injury  Have you felt more restless, tense or jumpy than usual? RESPONSES; YES+1/NO: NO  Have you found yourself unable to stop worrying? RESPONSES; YES+1/NO: NO  Do you find yourself thinking that the world is unsafe and that people are not to be trusted? RESPONSES; YES+1/NO: YES  +1 has always held these beliefs, fall made them worse    TOTAL SCORE from ITSS Questions 1 and 2:   1  NOTE: A score of greater than or equal to 2 is considered positive for PTSD risk and is to receive a community resource packet to link with appropriate providers.    Recommendations/Referrals for Brief and/or Specialized Treatment Provided to Patient  N/A    
Spiritual Health History and Assessment/Progress Note  [facility]    (P) Initial Encounter, Spiritual/Emotional Needs,  ,  ,      Name: Rajat Hayowod MRN: [id number]    Age: 75 y.o.     Sex: male   Language: English   Cheondoism: Jainism   Fall at home, initial encounter     Date: 3/21/2025            Total Time Calculated: (P) 8 min              Spiritual Assessment began in STRZ ONC MED 5K        Referral/Consult From: (P) Rounding   Encounter Overview/Reason: (P) Initial Encounter, Spiritual/Emotional Needs  Service Provided For: (P) Patient    Ivonne, Belief, Meaning:   Patient identifies as spiritual  Family/Friends identify as spiritual      Importance and Influence:  Patient has spiritual/personal beliefs that influence decisions regarding their health  Family/Friends have spiritual/personal beliefs that influence decisions regarding the patient's health    Community:  Patient feels well-supported. Support system includes: Extended family  Family/Friends feel well-supported. Support system includes: Extended family    Assessment and Plan of Care:   In my encounter with the 75 yr old patient, while rounding  the unit 5K,  I provided spiritual care to patient through conversation, I also came to assess the patient's spiritual needs present. The pt was admitted due to frequent falls at home.     Patient Interventions include: Facilitated expression of thoughts and feelings  Family/Friends Interventions include: Facilitated expression of thoughts and feelings    Patient Plan of Care: Spiritual Care available upon further referral  Family/Friends Plan of Care: Spiritual Care available upon further referral    Electronically signed by JUAN M Torres on 3/21/2025 at 3:10 PM   
Talked to Kevin in pharmacy regarding his IM injection Paliperidone Palmitate that is due tomorrow. [facility]. According to pharmacy they talked to their director Gloria and patient does not qualify for psych replacement plan and he will have to wait until he is discharge to SNF to get this due to this being a psych drug. Will pass onto oncoming shift regarding this. Also waiting on phone call from [facility] MD to also let her know this.   
This RN called report to Veronica at [facility]. No further questions at this time.     Transport set for 4pm.  
[facility]  OCCUPATIONAL THERAPY MISSED TREATMENT NOTE  STRZ ONC MED 5K  5K-23/023-A      Date: 3/24/2025  Patient Name: Rajat Haywood        CSN: [id number]   : 1950  (75 y.o.)  Gender: male   Referring Practitioner: Matty Ramirez APRN - CNP            REASON FOR MISSED TREATMENT:  patient semi reclined in bed upon OT arrival and working on breakfast. Patient declined to transfer to recliner to sit upright to self feed with better positioning and declined sitting up in bed. Patient states he didn't get up all weekend. OT to check back as able and time allows.                
cord and   moderate bilateral foraminal stenosis at C6/7.   4. There is mild canal stenosis, indentation upon the underlying spinal cord    Restrictions/Precautions:  Restrictions/Precautions: Fall Risk  Position Activity Restriction  Other Position/Activity Restrictions: per Ortho via [facility]: Pt able to do OOB activity with no bracing at this time. Monitor back pain. knee buckling     Social/Functional History:  Lives With: Alone  Type of Home: Apartment  Home Layout: One level  Home Access: Level entry  Home Equipment: Walker - Rolling, Cane   Bathroom Shower/Tub: Tub/Shower unit  Bathroom Toilet: Standard    Receives Help From: Friend(s)  Prior Level of Assist for ADLs: Independent  Prior Level of Assist for Homemaking: Needs assistance  Prior Level of Assist for Transfers: Independent  Prior Level of Assist for Ambulation: Independent household ambulator, with or without device  Has the patient had two or more falls in the past year or any fall with injury in the past year?: Yes    Active : Yes     Additional Comments: pt has frequent falls making it difficulty to do ADls and IADLs. Pt reports his [facility]  says he needs to [facility] LT due to high frequency of falls.    SUBJECTIVE: RN approved session. Pt sitting in bedside chair upon arrival. Pt pleasant and agreeable to OT session.    PAIN: denies    Vitals: Vitals not assessed per clinical judgement, see nursing flowsheet    COGNITION: Decreased Safety Awareness    ADL:   No ADL's completed this session.  Pt denies needs at this time .    IADL:   Not Tested    BED MOBILITY:  Not Tested    TRANSFERS:  Sit to Stand:  Minimal Assistance, X 1, with increased time for completion, cues for hand placement.    Stand to Sit: Minimal Assistance, X 1, with increased time for completion, cues for hand placement.      FUNCTIONAL MOBILITY:  Assistive Device: Rolling Walker  Assist Level:  Minimal Assistance and with increased time for completion.   Distance:  
training  General Plan:  (3-5x GM)    Goals:  Patient Goals : None stated  Short Term Goals  Time Frame for Short Term Goals: by discharge  Short Term Goal 1: bed mobility with HOB Flat, no rails, mod I for increased functional ind  Short Term Goal 2: sit<>stand from various surfaces with LRD mod I for safe transfers  Short Term Goal 3: ambulate 30' with LRD mod I for safe household distances  Long Term Goals  Time Frame for Long Term Goals : NA d/t short ELOS    Following session, patient left in safe position with all fall risk precautions in place.         
needed for Pain for up to 10 doses. Max Daily Amount: 150 mg            CONTINUE taking these medications      aspirin 81 MG tablet     atorvastatin 80 MG tablet  Commonly known as: LIPITOR     buPROPion 300 MG extended release tablet  Commonly known as: WELLBUTRIN XL     finasteride 5 MG tablet  Commonly known as: PROSCAR     hydrOXYzine HCl 10 MG tablet  Commonly known as: ATARAX     lisinopril 20 MG tablet  Commonly known as: PRINIVIL;ZESTRIL     metFORMIN 1000 MG tablet  Commonly known as: GLUCOPHAGE     omeprazole 20 MG delayed release capsule  Commonly known as: PRILOSEC     oxyBUTYnin 10 MG extended release tablet  Commonly known as: DITROPAN-XL     polyethyl glycol-propyl glycol 0.4-0.3 % 0.4-0.3 % ophthalmic solution  Commonly known as: SYSTANE     sildenafil 100 MG tablet  Commonly known as: VIAGRA     therapeutic multivitamin-minerals tablet     vitamin D 25 MCG (1000 UT) Tabs tablet  Commonly known as: CHOLECALCIFEROL            STOP taking these medications      terazosin 2 MG capsule  Commonly known as: HYTRIN               Where to Get Your Medications        You can get these medications from any pharmacy    Bring a paper prescription for each of these medications  traMADol 50 MG tablet       Information about where to get these medications is not yet available    Ask your nurse or doctor about these medications  doxazosin 4 MG tablet  gabapentin 100 MG capsule  miconazole 2 % powder  paliperidone 6 MG extended release tablet          Patient Instructions:    Discharge lab work: none  Activity: activity as tolerated  Diet: ADULT DIET; Easy to Chew; 4 carb choices (60 gm/meal)      Follow-up visits:   Tyron Bucklye MD  [address] [place] [address]  [phone]    Go on 4/14/2025  Appointment scheduled @ 10:20am    Deedee Bond  [address]  [place] [address]  [phone]             Disposition: home  Condition at Discharge: Stable    Time Spent: 45

## 2025-03-25 NOTE — PLAN OF CARE
Problem: Chronic Conditions and Co-morbidities  Goal: Patient's chronic conditions and co-morbidity symptoms are monitored and maintained or improved  3/21/2025 0920 by Jered Oh, RN  Outcome: Progressing     Problem: Safety - Adult  Goal: Free from fall injury  3/21/2025 0920 by Jered Oh, RN  Outcome: Progressing     Problem: Pain  Goal: Verbalizes/displays adequate comfort level or baseline comfort level  3/21/2025 0920 by Jered Oh, RN  Outcome: Progressing     
  Problem: Chronic Conditions and Co-morbidities  Goal: Patient's chronic conditions and co-morbidity symptoms are monitored and maintained or improved  3/25/2025 1045 by Cristela Hussein RN  Outcome: Adequate for Discharge     Problem: Safety - Adult  Goal: Free from fall injury  3/25/2025 1045 by Cristela Hussein RN  Outcome: Adequate for Discharge     Problem: Pain  Goal: Verbalizes/displays adequate comfort level or baseline comfort level  3/25/2025 1045 by Cristela Hussein RN  Outcome: Adequate for Discharge     Problem: Discharge Planning  Goal: Discharge to home or other facility with appropriate resources  3/25/2025 1045 by Cristela Hussein RN  Outcome: Adequate for Discharge     Problem: Skin/Tissue Integrity - Adult  Goal: Skin integrity remains intact  Description: 1.  Monitor for areas of redness and/or skin breakdown  2.  Assess vascular access sites hourly  3.  Every 4-6 hours minimum:  Change oxygen saturation probe site  4.  Every 4-6 hours:  If on nasal continuous positive airway pressure, respiratory therapy assess nares and determine need for appliance change or resting period  3/25/2025 1045 by Cristela Hussein RN  Outcome: Adequate for Discharge     Problem: Skin/Tissue Integrity - Adult  Goal: Incisions, wounds, or drain sites healing without S/S of infection  3/25/2025 1045 by Cristela Husseni RN  Outcome: Adequate for Discharge     Problem: Musculoskeletal - Adult  Goal: Return mobility to safest level of function  3/25/2025 1045 by Cristela Hussein RN  Outcome: Adequate for Discharge     Problem: Infection - Adult  Goal: Absence of infection during hospitalization  3/25/2025 1045 by Cristela Hussein RN  Outcome: Adequate for Discharge     Problem: Skin/Tissue Integrity  Goal: Skin integrity remains intact  Description: 1.  Monitor for areas of redness and/or skin breakdown  2.  Assess vascular access sites hourly  3.  Every 4-6 hours minimum:  Change oxygen saturation probe site  4.  
  Problem: Chronic Conditions and Co-morbidities  Goal: Patient's chronic conditions and co-morbidity symptoms are monitored and maintained or improved  Outcome: Progressing     Patient's chronic conditions and co-morbidity symptoms are monitored and maintained .     Problem: Safety - Adult  Goal: Free from fall injury  Outcome: Progressing  Fall assessment completed. Patient using call light appropriately to call for assistance.  Personal items within reach. Patient is also compliant with use of non-skid slippers.      Problem: Pain  Goal: Verbalizes/displays adequate comfort level or baseline comfort level  Outcome: Progressing    Patient states pain relief from PRN pain medications. Pain reassessed one hour post PRN pain medication given.  Patient rates pain 3-5 on PINO 0-10 scale.     Problem: Discharge Planning  Goal: Discharge to home or other facility with appropriate resources  Outcome: Progressing    Discharge plan is in process. Plan discharge to [facility].     Problem: Skin/Tissue Integrity - Adult  Goal: Skin integrity remains intact  Outcome: Progressing    No skin breakdown this shift. Patient being assisted with turning. Patients states understanding of repositioning every two hours.     Problem: Skin/Tissue Integrity - Adult  Goal: Incisions, wounds, or drain sites healing without S/S of infection  Outcome: Progressing     Incisions, wounds  sites healing without S/S of infection.    Problem: Musculoskeletal - Adult  Goal: Return mobility to safest level of function  Outcome: Progressing  Patient on bed rest till ortho sees and MRI of spine complete.     Problem: Infection - Adult  Goal: Absence of infection during hospitalization  Outcome: Progressing      No s/s infection for shift. VS- WNL.    Care plan reviewed with patient and family.  Patient and family verbalize understanding of the plan of care and contribute to goal setting.         
  Problem: Chronic Conditions and Co-morbidities  Goal: Patient's chronic conditions and co-morbidity symptoms are monitored and maintained or improved  Outcome: Progressing  Flowsheets (Taken 3/17/2025 2134)  Care Plan - Patient's Chronic Conditions and Co-Morbidity Symptoms are Monitored and Maintained or Improved:   Monitor and assess patient's chronic conditions and comorbid symptoms for stability, deterioration, or improvement   Collaborate with multidisciplinary team to address chronic and comorbid conditions and prevent exacerbation or deterioration   Update acute care plan with appropriate goals if chronic or comorbid symptoms are exacerbated and prevent overall improvement and discharge     Problem: Safety - Adult  Goal: Free from fall injury  Outcome: Progressing  Flowsheets (Taken 3/17/2025 2134)  Free From Fall Injury:   Instruct family/caregiver on patient safety   Based on caregiver fall risk screen, instruct family/caregiver to ask for assistance with transferring infant if caregiver noted to have fall risk factors     Problem: Pain  Goal: Verbalizes/displays adequate comfort level or baseline comfort level  Outcome: Progressing  Flowsheets (Taken 3/17/2025 2134)  Verbalizes/displays adequate comfort level or baseline comfort level:   Encourage patient to monitor pain and request assistance   Assess pain using appropriate pain scale   Administer analgesics based on type and severity of pain and evaluate response   Implement non-pharmacological measures as appropriate and evaluate response   Consider cultural and social influences on pain and pain management     Problem: Discharge Planning  Goal: Discharge to home or other facility with appropriate resources  Outcome: Progressing  Flowsheets (Taken 3/19/2025 0511)  Discharge to home or other facility with appropriate resources:   Identify barriers to discharge with patient and caregiver   Arrange for needed discharge resources and transportation as 
  Problem: Chronic Conditions and Co-morbidities  Goal: Patient's chronic conditions and co-morbidity symptoms are monitored and maintained or improved  Outcome: Progressing  Flowsheets (Taken 3/17/2025 2134)  Care Plan - Patient's Chronic Conditions and Co-Morbidity Symptoms are Monitored and Maintained or Improved:   Monitor and assess patient's chronic conditions and comorbid symptoms for stability, deterioration, or improvement   Collaborate with multidisciplinary team to address chronic and comorbid conditions and prevent exacerbation or deterioration   Update acute care plan with appropriate goals if chronic or comorbid symptoms are exacerbated and prevent overall improvement and discharge     Problem: Safety - Adult  Goal: Free from fall injury  Outcome: Progressing  Flowsheets (Taken 3/17/2025 2134)  Free From Fall Injury:   Instruct family/caregiver on patient safety   Based on caregiver fall risk screen, instruct family/caregiver to ask for assistance with transferring infant if caregiver noted to have fall risk factors     Problem: Pain  Goal: Verbalizes/displays adequate comfort level or baseline comfort level  Outcome: Progressing  Flowsheets (Taken 3/17/2025 2134)  Verbalizes/displays adequate comfort level or baseline comfort level:   Encourage patient to monitor pain and request assistance   Assess pain using appropriate pain scale   Administer analgesics based on type and severity of pain and evaluate response   Implement non-pharmacological measures as appropriate and evaluate response   Consider cultural and social influences on pain and pain management   Care plan reviewed with patient.  Patient verbalize understanding of the plan of care and contribute to goal setting.     
  Problem: Chronic Conditions and Co-morbidities  Goal: Patient's chronic conditions and co-morbidity symptoms are monitored and maintained or improved  Outcome: Progressing  Flowsheets (Taken 3/20/2025 0453)  Care Plan - Patient's Chronic Conditions and Co-Morbidity Symptoms are Monitored and Maintained or Improved: Monitor and assess patient's chronic conditions and comorbid symptoms for stability, deterioration, or improvement     Problem: Safety - Adult  Goal: Free from fall injury  Outcome: Progressing  Flowsheets (Taken 3/20/2025 0453)  Free From Fall Injury: Instruct family/caregiver on patient safety     Problem: Pain  Goal: Verbalizes/displays adequate comfort level or baseline comfort level  Outcome: Progressing  Flowsheets (Taken 3/20/2025 0453)  Verbalizes/displays adequate comfort level or baseline comfort level:   Encourage patient to monitor pain and request assistance   Assess pain using appropriate pain scale     Problem: Discharge Planning  Goal: Discharge to home or other facility with appropriate resources  Outcome: Progressing     Problem: Skin/Tissue Integrity - Adult  Goal: Skin integrity remains intact  Outcome: Progressing  Flowsheets (Taken 3/20/2025 0453)  Skin Integrity Remains Intact:   Monitor for areas of redness and/or skin breakdown   Assess vascular access sites hourly     Problem: Skin/Tissue Integrity - Adult  Goal: Incisions, wounds, or drain sites healing without S/S of infection  Outcome: Progressing  Flowsheets (Taken 3/20/2025 0453)  Incisions, Wounds, or Drain Sites Healing Without Sign and Symptoms of Infection:   ADMISSION and DAILY: Assess and document risk factors for pressure ulcer development   TWICE DAILY: Assess and document skin integrity     Problem: Musculoskeletal - Adult  Goal: Return mobility to safest level of function  Outcome: Progressing  Flowsheets (Taken 3/20/2025 0453)  Return Mobility to Safest Level of Function:   Assist with transfers and ambulation 
  Problem: Chronic Conditions and Co-morbidities  Goal: Patient's chronic conditions and co-morbidity symptoms are monitored and maintained or improved  Outcome: Progressing  Flowsheets (Taken 3/21/2025 2030)  Care Plan - Patient's Chronic Conditions and Co-Morbidity Symptoms are Monitored and Maintained or Improved: Monitor and assess patient's chronic conditions and comorbid symptoms for stability, deterioration, or improvement     Problem: Pain  Goal: Verbalizes/displays adequate comfort level or baseline comfort level  Outcome: Progressing  Flowsheets (Taken 3/21/2025 2030)  Verbalizes/displays adequate comfort level or baseline comfort level:   Encourage patient to monitor pain and request assistance   Assess pain using appropriate pain scale   Administer analgesics based on type and severity of pain and evaluate response   Implement non-pharmacological measures as appropriate and evaluate response     Problem: Discharge Planning  Goal: Discharge to home or other facility with appropriate resources  Outcome: Progressing  Flowsheets (Taken 3/21/2025 2030)  Discharge to home or other facility with appropriate resources: Identify barriers to discharge with patient and caregiver     Problem: Skin/Tissue Integrity - Adult  Goal: Incisions, wounds, or drain sites healing without S/S of infection  Outcome: Progressing  Flowsheets (Taken 3/21/2025 2030)  Incisions, Wounds, or Drain Sites Healing Without Sign and Symptoms of Infection:   ADMISSION and DAILY: Assess and document risk factors for pressure ulcer development   TWICE DAILY: Assess and document dressing/incision, wound bed, drain sites and surrounding tissue   TWICE DAILY: Assess and document skin integrity   Implement wound care per orders     Problem: Musculoskeletal - Adult  Goal: Return mobility to safest level of function  Outcome: Progressing  Flowsheets (Taken 3/21/2025 2030)  Return Mobility to Safest Level of Function: Assess patient stability and 
  Problem: Safety - Adult  Goal: Free from fall injury  3/20/2025 1243 by Vania Vidal RN  Outcome: Progressing   Patient will have no falls or injuries    Problem: Pain  Goal: Verbalizes/displays adequate comfort level or baseline comfort level  3/20/2025 1243 by Vania Vidal RN  Outcome: Progressing  Flowsheets (Taken 3/20/2025 0800)  Verbalizes/displays adequate comfort level or baseline comfort level: Encourage patient to monitor pain and request assistance   Patient will have acceptable pain control    Problem: Discharge Planning  Goal: Discharge to home or other facility with appropriate resources  3/20/2025 1243 by Vania Vidal RN  Outcome: Progressing   Patient will be discharged when medically stable    Care plan reviewed with patient.  Patient verbalize understanding of the plan of care and contribute to goal setting.    
Patient plans an ECF stay.   
Problem: Chronic Conditions and Co-morbidities  Goal: Patient's chronic conditions and co-morbidity symptoms are monitored and maintained or improved  Outcome: Progressing  Care Plan - Patient's Chronic Conditions and Co-Morbidity Symptoms are Monitored and Maintained or Improved: Monitor and assess patient's chronic conditions and comorbid symptoms for stability, deterioration, or improvement     Problem: Pain  Goal: Verbalizes/displays adequate comfort level or baseline comfort level  Outcome: Progressing  Verbalizes/displays adequate comfort level or baseline comfort level:   Encourage patient to monitor pain and request assistance   Assess pain using appropriate pain scale   Administer analgesics based on type and severity of pain and evaluate response   Implement non-pharmacological measures as appropriate and evaluate response     Problem: Discharge Planning  Goal: Discharge to home or other facility with appropriate resources  Outcome: Progressing  Discharge to home or other facility with appropriate resources: Identify barriers to discharge with patient and caregiver     Problem: Skin/Tissue Integrity - Adult  Goal: Incisions, wounds, or drain sites healing without S/S of infection  Outcome: Progressing  Incisions, Wounds, or Drain Sites Healing Without Sign and Symptoms of Infection:   ADMISSION and DAILY: Assess and document risk factors for pressure ulcer development   TWICE DAILY: Assess and document dressing/incision, wound bed, drain sites and surrounding tissue   TWICE DAILY: Assess and document skin integrity   Implement wound care per orders     Problem: Musculoskeletal - Adult  Goal: Return mobility to safest level of function  Outcome: Progressing  Return Mobility to Safest Level of Function: Assess patient stability and activity tolerance for standing, transferring and ambulating with or without assistive devices     Problem: Infection - Adult  Goal: Absence of infection during 
(Taken 3/20/2025 2247)  Absence of infection during hospitalization:   Assess and monitor for signs and symptoms of infection   Monitor lab/diagnostic results     Problem: Skin/Tissue Integrity  Goal: Skin integrity remains intact  Description: 1.  Monitor for areas of redness and/or skin breakdown  2.  Assess vascular access sites hourly  3.  Every 4-6 hours minimum:  Change oxygen saturation probe site  4.  Every 4-6 hours:  If on nasal continuous positive airway pressure, respiratory therapy assess nares and determine need for appliance change or resting period  3/20/2025 2247 by Thiago Steinberg RN  Outcome: Progressing  Flowsheets  Taken 3/20/2025 2247  Skin Integrity Remains Intact:   Monitor for areas of redness and/or skin breakdown   Assess vascular access sites hourly  Taken 3/20/2025 2028  Skin Integrity Remains Intact:   Monitor for areas of redness and/or skin breakdown   Assess vascular access sites hourly       Care plan reviewed with patient and family. Patient and family verbalize understanding of the plan of care and contribute to goal setting.

## 2025-03-25 NOTE — CARE COORDINATION
3/25/25, 10:11 AM EDT    DISCHARGE PLANNING EVALUATION    Call from Mariza with [facility], reports they can take pt today, reports [facility] signed off on approval, was just waiting on a discharge date. DAVIE message provider, will plan for discharge today.     DAVIE met with Rajat, updated on above, discussed transport, SW discuss option of private vehicle vs ambulette, explained insurance does not generally pay for ambulette provided estimated costs. Pt wanting DAVIE to call [facility] with [facility] to see if options through [facility] for transport. DAVIE did call [facility], left a voicemail for a call back asking about resources for transport to facility.     10:45 AM EDT  DAVIE met with pt again, updated on waiting to hear back from [facility], pt reports she just spoke with him, he reports he can go by ambulance and [facility] would pay for it. DAVIE did explain to pt does not have medical necessity to go by ambulance, explain SW could set this up, but would be concerned of pt getting larger bill if VA does no cover. Davie let pt know she would try calling [facility] again.     Call to [facility] with [facility], did ask if [facility] can transport, DAVIE let her know she can check on this, DAVIE explain process of ambulette and educating pt's on estimated costs if VA does not cover. [facility] did let pt know he does have the funds to pay for the ambulette costs if [facility] does not cover.     DAVIE did call Mariza with [facility], they cannot transport. DAVIE faxed request to [facility] for ambulette transport    11:03 AM EDT  DAVIE met with pt, discussed above, pt okay with plans for ambulette. DAVIE updated pt on transport time at 4pm, nurse aware.     3/25/25, 11:06 AM EDT    Patient goals/plan/ treatment preferences discussed by  and .  Patient goals/plan/ treatment preferences reviewed with patient/ family.  Patient/ family verbalize understanding of discharge plan and are in agreement with goal/plan/treatment preferences.  Understanding was demonstrated using the teach back

## 2025-03-26 NOTE — DISCHARGE SUMMARY
Hospitalist Discharge Summary     Patient: Rajat Haywood  YOB: 1950  MRN: [id number]         Acct: [id number]                Primary Care Physician: Sienna Hurst APRN - CNP     Admit date  3/17/2025                         Discharge date:  3/25/2025     Chief Complaint on presentation: frequent falls     Initial H&P and Hospital course:  History obtained from chart review and the patient.     The patient is a 75 y.o. male who presents with frequent falls and bilateral leg weakness. Complaints have been ongoing. Looking into assistive living facilities. Fell twice today, three times yesterday. States his legs are weak and they give out. Right is weaker than left. No acute injury reported. Does have multiple stages of ecchymosis and abrasions on lower extremities. Presents with inability to care for self. Agreeable to AL or SNF.      Hospital Course: Patient admitted on 3/17/2025 with complaints of frequent falls, bilateral leg weakness.  Orthospine consulted and MRI C/T/L obtained.  MRI cervical spine demonstrated spinal cord compression that could be contributing to falls and C3-7 ACDF recommended per orthospine, however patient declined surgical intervention.  Patient also noted to have a wound of his right foot and podiatry was consulted.  Vascular arterial studies were obtained of the lower extremities with recommendations to follow-up with vascular surgery outpatient given PAD and current foot wound.  Patient to follow-up with podiatry outpatient for continued wound care.  Patient to be discharged to nursing facility for continued therapy.  Discussed discharge with patient and patient agreement.  All VSS.     Subjective (day of discharge):   3/25: No acute events overnight.  Patient denies any chest pain, shortness of breath, abdominal pain, nausea or vomiting.     Patient responded well to medical management. Consultants had signed off or were contacted and agree with discharge plan.

## 2025-03-31 ENCOUNTER — APPOINTMENT (OUTPATIENT)
Dept: GENERAL RADIOLOGY | Age: 75
End: 2025-03-31
Payer: OTHER GOVERNMENT

## 2025-03-31 ENCOUNTER — HOSPITAL ENCOUNTER (EMERGENCY)
Age: 75
Discharge: NURSING, REHAB OR LONG-TERM CARE | End: 2025-03-31
Payer: OTHER GOVERNMENT

## 2025-03-31 VITALS
OXYGEN SATURATION: 94 % | SYSTOLIC BLOOD PRESSURE: 141 MMHG | RESPIRATION RATE: 22 BRPM | DIASTOLIC BLOOD PRESSURE: 83 MMHG | TEMPERATURE: 98 F | HEART RATE: 63 BPM

## 2025-03-31 DIAGNOSIS — I96 GANGRENE (HCC): Primary | ICD-10-CM

## 2025-03-31 DIAGNOSIS — S91.109A OPEN WOUND OF TOE, INITIAL ENCOUNTER: ICD-10-CM

## 2025-03-31 LAB
ALBUMIN SERPL BCG-MCNC: 3.2 G/DL (ref 3.4–4.9)
ALP SERPL-CCNC: 117 U/L (ref 40–129)
ALT SERPL W/O P-5'-P-CCNC: 27 U/L (ref 10–50)
ANION GAP SERPL CALC-SCNC: 11 MEQ/L (ref 8–16)
AST SERPL-CCNC: 21 U/L (ref 10–50)
BASOPHILS ABSOLUTE: 0.1 THOU/MM3 (ref 0–0.1)
BASOPHILS NFR BLD AUTO: 0.8 %
BILIRUB SERPL-MCNC: 0.5 MG/DL (ref 0.3–1.2)
BUN SERPL-MCNC: 22 MG/DL (ref 8–23)
CALCIUM SERPL-MCNC: 9.1 MG/DL (ref 8.8–10.2)
CHLORIDE SERPL-SCNC: 101 MEQ/L (ref 98–111)
CO2 SERPL-SCNC: 22 MEQ/L (ref 22–29)
CREAT SERPL-MCNC: 0.7 MG/DL (ref 0.7–1.2)
CRP SERPL-MCNC: 4.71 MG/DL (ref 0–0.5)
DEPRECATED RDW RBC AUTO: 51.6 FL (ref 35–45)
EOSINOPHIL NFR BLD AUTO: 1.4 %
EOSINOPHILS ABSOLUTE: 0.1 THOU/MM3 (ref 0–0.4)
ERYTHROCYTE [DISTWIDTH] IN BLOOD BY AUTOMATED COUNT: 18.2 % (ref 11.5–14.5)
GFR SERPL CREATININE-BSD FRML MDRD: > 90 ML/MIN/1.73M2
GLUCOSE SERPL-MCNC: 180 MG/DL (ref 74–109)
HCT VFR BLD AUTO: 57 % (ref 42–52)
HGB BLD-MCNC: 17.9 GM/DL (ref 14–18)
IMM GRANULOCYTES # BLD AUTO: 0.04 THOU/MM3 (ref 0–0.07)
IMM GRANULOCYTES NFR BLD AUTO: 0.4 %
LACTIC ACID, SEPSIS: 2.1 MMOL/L (ref 0.5–1.9)
LYMPHOCYTES ABSOLUTE: 1 THOU/MM3 (ref 1–4.8)
LYMPHOCYTES NFR BLD AUTO: 11.1 %
MCH RBC QN AUTO: 27 PG (ref 26–33)
MCHC RBC AUTO-ENTMCNC: 31.4 GM/DL (ref 32.2–35.5)
MCV RBC AUTO: 85.8 FL (ref 80–94)
MONOCYTES ABSOLUTE: 0.5 THOU/MM3 (ref 0.4–1.3)
MONOCYTES NFR BLD AUTO: 5 %
NEUTROPHILS ABSOLUTE: 7.3 THOU/MM3 (ref 1.8–7.7)
NEUTROPHILS NFR BLD AUTO: 81.3 %
NRBC BLD AUTO-RTO: 0 /100 WBC
OSMOLALITY SERPL CALC.SUM OF ELEC: 276.1 MOSMOL/KG (ref 275–300)
PLATELET # BLD AUTO: 374 THOU/MM3 (ref 130–400)
PMV BLD AUTO: 9.3 FL (ref 9.4–12.4)
POTASSIUM SERPL-SCNC: 4.5 MEQ/L (ref 3.5–5.2)
PROT SERPL-MCNC: 6.6 G/DL (ref 6.4–8.3)
RBC # BLD AUTO: 6.64 MILL/MM3 (ref 4.7–6.1)
SODIUM SERPL-SCNC: 134 MEQ/L (ref 135–145)
WBC # BLD AUTO: 9 THOU/MM3 (ref 4.8–10.8)

## 2025-03-31 PROCEDURE — 83605 ASSAY OF LACTIC ACID: CPT

## 2025-03-31 PROCEDURE — 99284 EMERGENCY DEPT VISIT MOD MDM: CPT

## 2025-03-31 PROCEDURE — 73630 X-RAY EXAM OF FOOT: CPT

## 2025-03-31 PROCEDURE — 6360000002 HC RX W HCPCS

## 2025-03-31 PROCEDURE — 2580000003 HC RX 258

## 2025-03-31 PROCEDURE — 80053 COMPREHEN METABOLIC PANEL: CPT

## 2025-03-31 PROCEDURE — 86140 C-REACTIVE PROTEIN: CPT

## 2025-03-31 PROCEDURE — 85025 COMPLETE CBC W/AUTO DIFF WBC: CPT

## 2025-03-31 PROCEDURE — 96365 THER/PROPH/DIAG IV INF INIT: CPT

## 2025-03-31 PROCEDURE — 36415 COLL VENOUS BLD VENIPUNCTURE: CPT

## 2025-03-31 RX ORDER — SULFAMETHOXAZOLE AND TRIMETHOPRIM 800; 160 MG/1; MG/1
1 TABLET ORAL 2 TIMES DAILY
Qty: 10 TABLET | Refills: 0 | Status: SHIPPED | OUTPATIENT
Start: 2025-03-31 | End: 2025-04-05

## 2025-03-31 RX ORDER — 0.9 % SODIUM CHLORIDE 0.9 %
500 INTRAVENOUS SOLUTION INTRAVENOUS ONCE
Status: COMPLETED | OUTPATIENT
Start: 2025-03-31 | End: 2025-03-31

## 2025-03-31 RX ADMIN — SODIUM CHLORIDE 500 ML: 0.9 INJECTION, SOLUTION INTRAVENOUS at 17:42

## 2025-03-31 RX ADMIN — PIPERACILLIN AND TAZOBACTAM 4500 MG: 4; .5 INJECTION, POWDER, LYOPHILIZED, FOR SOLUTION INTRAVENOUS at 17:40

## 2025-03-31 ASSESSMENT — PAIN - FUNCTIONAL ASSESSMENT: PAIN_FUNCTIONAL_ASSESSMENT: 0-10

## 2025-03-31 ASSESSMENT — PAIN SCALES - GENERAL
PAINLEVEL_OUTOF10: 4
PAINLEVEL_OUTOF10: 3

## 2025-03-31 NOTE — PROGRESS NOTES
Pharmacy Note - Extended Infusion Beta-Lactam Dose Adjustment    Piperacillin/Tazobactam 3375 mg x 1 extended infusion ordered for the treatment of Skin and soft tissue infection. Per [facility] Extended Infusion Beta-Lactam Policy, this will be changed to 4500 mg loading dose x 1    Estimated Creatinine Clearance: Estimated Creatinine Clearance: 87 mL/min (based on SCr of 0.7 mg/dL).    Dialysis Status, CATHY, CKD: N/A    BMI: There is no height or weight on file to calculate BMI.    Rationale for Adjustment: Dose adjusted per [facility] Extended Infusion Policy based on renal function and indication. The above medication is renally eliminated and demonstrates time-dependent effects on bacterial eradication. Extended-infusion dosing strategy aims to enhance microbiologic and clinical efficacy.     Pharmacy will monitor renal function daily and adjust dose as necessary.    Please call with any questions.    Thank you,    Rose Schneider, PharmD, BCPS  3/31/2025  5:04 PM

## 2025-03-31 NOTE — DISCHARGE INSTRUCTIONS
Take antibiotics as prescribed.  Follow-up with vascular and podiatry.     Return to the emergency department for development of fever, chest pain, shortness of breath, worsening of wound, purulent or malodorous discharge from wound, or any other care concern.

## 2025-03-31 NOTE — ED PROVIDER NOTES
[facility] EMERGENCY DEPARTMENT      EMERGENCY MEDICINE     Pt Name: Rajat Haywood  MRN: [id number]  Birthdate 1950  Date of evaluation: 3/31/2025  Provider: Lisset Montes PA-C    CHIEF COMPLAINT       Chief Complaint   Patient presents with    Toe Pain    Wound Infection     HISTORY OF PRESENT ILLNESS   Rajat Haywood is a pleasant 75 y.o. male with past medical history of PAD and DM who presents to the emergency department from from nursing home, brought in by EMS for evaluation of right second digit of foot wound.  Patient states he noticed a wound to his second digit of his left toe about 1 month ago, that has been progressing.  Patient is currently living in a rehab facility due to weakness and falls.    Patient denies fever, chest pain, dyspnea, lightheadedness, diaphoresis, emesis.    PASTMEDICAL HISTORY     Past Medical History:   Diagnosis Date    Anxiety     BPH (benign prostatic hyperplasia)     DDD (degenerative disc disease), cervical     DDD (degenerative disc disease), lumbar     Depression     DM2 (diabetes mellitus, type 2) ([facility])     Dyslipidemia     Erectile dysfunction     Hypertension, essential     OAB (overactive bladder)     PAD (peripheral artery disease)     Schizophrenia ([facility])     Spinal stenosis, cervical region     Spinal stenosis, lumbar        Patient Active Problem List   Diagnosis Code    BPH (benign prostatic hyperplasia) N40.0    DDD (degenerative disc disease), cervical M50.30    DDD (degenerative disc disease), lumbar M51.369    Depression F32.A    DM2 (diabetes mellitus, type 2) ([facility]) E11.9    Dyslipidemia E78.5    Erectile dysfunction N52.9    Hypertension, essential I10    Myelomalacia of cervical cord ([facility]) 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

## 2025-03-31 NOTE — CONSULTS
[facility]  Podiatric Medicine and Surgery Consultation Note      Rajat Haywood  Medical record number:  [id number]  Age: 75 y.o.   Gender: male  : 1950  Episode date:  3/31/2025    Subjective      History of present illness    Patient is a 75 y.o. male with a history of DM type 2, PAD seen bedside today on behalf of Dr Eliazbeth. Patient appeared pleasant, was oriented to person, place and time and in no acute distress. Patient was brought in from SNF by EMS with complaints of a right 2nd toe wound that has been worsening since about 1 month ago. States that it was unnoticed by his facility until recently. Patient denies any N/V/F/C/SOB or CP. No other pedal concerns.            Past medical history        Diagnosis Date    Anxiety     BPH (benign prostatic hyperplasia)     DDD (degenerative disc disease), cervical     DDD (degenerative disc disease), lumbar     Depression     DM2 (diabetes mellitus, type 2) ([facility])     Dyslipidemia     Erectile dysfunction     Hypertension, essential     OAB (overactive bladder)     PAD (peripheral artery disease)     Schizophrenia ([facility])     Spinal stenosis, cervical region     Spinal stenosis, lumbar        Past surgical history        Procedure Laterality Date    COLONOSCOPY      UPPER GASTROINTESTINAL ENDOSCOPY         Family history    History reviewed. No pertinent family history.    Social history    Social History     Tobacco Use    Smoking status: Never    Smokeless tobacco: Never   Vaping Use    Vaping status: Never Used   Substance Use Topics    Alcohol use: Never    Drug use: Never       Allergies    No Known Allergies    Medications    No current facility-administered medications on file prior to encounter.     Current Outpatient Medications on File Prior to Encounter   Medication Sig Dispense Refill    gabapentin (NEURONTIN) 100 MG capsule Take 1 capsule by mouth 3 times daily for 10 days.      doxazosin (CARDURA) 4 MG tablet Take 1 tablet by

## 2025-04-01 LAB
BUN / CREAT RATIO: 23 (ref 7–25)
BUN BLDV-MCNC: 16 MG/DL (ref 3–29)
CALCIUM SERPL-MCNC: 9.3 MG/DL (ref 8.5–10.5)
CHLORIDE BLD-SCNC: 101 MEQ/L (ref 96–110)
CO2: 23 MEQ/L (ref 19–32)
CREAT SERPL-MCNC: 0.7 MG/DL (ref 0.5–1.2)
ESTIMATED GLOMERULAR FILTRATION RATE CREATININE EQUATION: 97 MLS/MIN/1.73M2
FASTING STATUS: NORMAL
GLUCOSE BLD-MCNC: 78 MG/DL (ref 70–99)
HCT VFR BLD CALC: 54.7 % (ref 37.5–51)
HEMOGLOBIN: 17.4 G/DL (ref 13–17.7)
MCH RBC QN AUTO: 26.4 PG (ref 26–34)
MCHC RBC AUTO-ENTMCNC: 31.8 G/DL (ref 30.7–35.5)
MCV RBC AUTO: 83.1 FL (ref 80–100)
PDW BLD-RTO: 17.1 %
PLATELET # BLD: 378 K/UL (ref 140–400)
PMV BLD AUTO: 9.9 FL (ref 7.2–11.7)
POTASSIUM SERPL-SCNC: 4.9 MEQ/L (ref 3.4–5.3)
RBC # BLD: 6.58 M/UL (ref 4.14–5.8)
SODIUM BLD-SCNC: 136 MEQ/L (ref 135–148)
WBC # BLD: 7.9 K/UL (ref 3.5–10.9)

## 2025-04-16 ENCOUNTER — OFFICE VISIT (OUTPATIENT)
Age: 75
End: 2025-04-16
Payer: OTHER GOVERNMENT

## 2025-04-16 VITALS
BODY MASS INDEX: 19.51 KG/M2 | HEIGHT: 67 IN | HEART RATE: 67 BPM | DIASTOLIC BLOOD PRESSURE: 59 MMHG | SYSTOLIC BLOOD PRESSURE: 118 MMHG | WEIGHT: 124.3 LBS

## 2025-04-16 DIAGNOSIS — I73.9 PAD (PERIPHERAL ARTERY DISEASE): ICD-10-CM

## 2025-04-16 DIAGNOSIS — F20.9 SCHIZOPHRENIA, UNSPECIFIED TYPE: Chronic | ICD-10-CM

## 2025-04-16 DIAGNOSIS — I96 GANGRENE OF TOE OF RIGHT FOOT (HCC): Primary | ICD-10-CM

## 2025-04-16 PROCEDURE — 99203 OFFICE O/P NEW LOW 30 MIN: CPT | Performed by: SURGERY

## 2025-04-16 PROCEDURE — 3078F DIAST BP <80 MM HG: CPT | Performed by: SURGERY

## 2025-04-16 PROCEDURE — 3074F SYST BP LT 130 MM HG: CPT | Performed by: SURGERY

## 2025-04-16 PROCEDURE — 1124F ACP DISCUSS-NO DSCNMKR DOCD: CPT | Performed by: SURGERY

## 2025-04-16 RX ORDER — POLYETHYLENE GLYCOL 3350 17 G/17G
17 POWDER, FOR SOLUTION ORAL DAILY PRN
COMMUNITY

## 2025-04-16 RX ORDER — ACETAMINOPHEN 325 MG/1
650 TABLET ORAL EVERY 4 HOURS PRN
COMMUNITY

## 2025-04-16 RX ORDER — DOCUSATE SODIUM 100 MG/1
100 CAPSULE, LIQUID FILLED ORAL 2 TIMES DAILY
COMMUNITY

## 2025-04-16 ASSESSMENT — ENCOUNTER SYMPTOMS
SHORTNESS OF BREATH: 0
ABDOMINAL PAIN: 0

## 2025-04-16 NOTE — H&P (VIEW-ONLY)
(PG)   Date Value   04/01/2025 26.4     MCHC (G/DL)   Date Value   04/01/2025 31.8     RDW (%)   Date Value   04/01/2025 17.1 (H)     MPV (FL)   Date Value   04/01/2025 9.9     Platelets (K/uL)   Date Value   04/01/2025 378         BUN (MG/DL)   Date Value   04/01/2025 16     Sodium (MEQ/L)   Date Value   04/01/2025 136     Potassium (MEQ/L)   Date Value   04/01/2025 4.9     Potassium reflex Magnesium (meq/L)   Date Value   03/22/2025 4.0     Chloride (MEQ/L)   Date Value   04/01/2025 101     CO2 (MEQ/L)   Date Value   04/01/2025 23         Physical Exam  Vitals reviewed.   Constitutional:       General: He is not in acute distress.  HENT:      Head: Normocephalic.   Eyes:      Pupils: Pupils are equal, round, and reactive to light.   Cardiovascular:      Rate and Rhythm: Normal rate and regular rhythm.   Musculoskeletal:      Comments: Right 2nd toe gangrene. Dressing on.     Faint right AT signal. (+) left DP, PT signals. No left foot wounds    Palpable CFA pulses +2 bilat   Skin:     Capillary Refill: Capillary refill takes more than 3 seconds.   Neurological:      Mental Status: He is alert and oriented to person, place, and time. Mental status is at baseline.      Cranial Nerves: No cranial nerve deficit.   Psychiatric:         Mood and Affect: Mood normal.         Behavior: Behavior normal.        Arterial duplex reviewed per HPI. Normal LLE study.     Assessment and Plan:     Rajat Haywood is an 75 y.o. male with right lower extremity critical limb ischemia due to PAD, right 2nd toe gangrene      Diagnosis Orders   1. PAD (peripheral artery disease)        2. Schizophrenia, unspecified type        3. Gangrene of toe of right foot (HCC)            - We will take him urgently to the cath lab for a right lower extremity angiogram, to see if his limb is still salvageable either through bypass or endovascular intervention. Discussed with the patient. Continue ASA, statin. The patient understands the risks and

## 2025-04-16 NOTE — PROGRESS NOTES
Chief Complaint   Patient presents with    New Patient         HPI: Rajat Haywood is an 75 y.o. male who presents with right 2nd toe gangrene and CLI. His arterial duplex shows complete occlusion of the right SFA, popliteal and two tibial arteries. He says he has had pain in the right foot and difficulty walking for more than three months. He no longer smokes tobacco. He is in a SNF due to his ambulation issues. He has a Hx of schizophrenia. Last HbA1c 6.2.      Review of Systems   Constitutional:  Positive for fatigue.   Respiratory:  Negative for shortness of breath.    Cardiovascular:  Negative for chest pain and palpitations.   Gastrointestinal:  Negative for abdominal pain.   Genitourinary:  Negative for dysuria.   Neurological:  Negative for speech difficulty and numbness.   Psychiatric/Behavioral:  Negative for behavioral problems and confusion.           Allergies: No Known Allergies      Current Outpatient Medications   Medication Sig Dispense Refill    docusate sodium (COLACE) 100 MG capsule Take 1 capsule by mouth 2 times daily      polyethylene glycol (GLYCOLAX) 17 g packet Take 1 packet by mouth daily as needed for Constipation      acetaminophen (TYLENOL) 325 MG tablet Take 2 tablets by mouth every 4 hours as needed for Pain      HYDROcodone-acetaminophen (NORCO) 5-325 MG per tablet Take 1 tablet by mouth every 6 hours as needed for Pain for up to 14 days. Intended supply: 3 days. Take lowest dose possible to manage pain Max Daily Amount: 4 tablets 56 tablet 0    gabapentin (NEURONTIN) 100 MG capsule Take 1 capsule by mouth 3 times daily for 10 days.      doxazosin (CARDURA) 4 MG tablet Take 1 tablet by mouth at bedtime      paliperidone (INVEGA) 6 MG extended release tablet Take 2 tablets by mouth daily      miconazole (MICOTIN) 2 % powder Apply topically 2 times daily.      lisinopril (PRINIVIL;ZESTRIL) 20 MG tablet Take 1 tablet by mouth daily      oxyBUTYnin (DITROPAN-XL) 10 MG extended release

## 2025-04-22 ENCOUNTER — TELEPHONE (OUTPATIENT)
Age: 75
End: 2025-04-22

## 2025-04-22 ENCOUNTER — PREP FOR PROCEDURE (OUTPATIENT)
Dept: CARDIOTHORACIC SURGERY | Age: 75
End: 2025-04-22

## 2025-04-22 LAB
HCT VFR BLD CALC: 49.4 % (ref 37.5–51)
HEMOGLOBIN: 15.9 G/DL (ref 13–17.7)
MCH RBC QN AUTO: 27.2 PG (ref 26–34)
MCHC RBC AUTO-ENTMCNC: 32.2 G/DL (ref 30.7–35.5)
MCV RBC AUTO: 84.4 FL (ref 80–100)
PDW BLD-RTO: 18.1 %
PLATELET # BLD: 357 K/UL (ref 140–400)
PMV BLD AUTO: 9.6 FL (ref 7.2–11.7)
RBC # BLD: 5.85 M/UL (ref 4.14–5.8)
WBC # BLD: 7.9 K/UL (ref 3.5–10.9)

## 2025-04-22 RX ORDER — SODIUM CHLORIDE 0.9 % (FLUSH) 0.9 %
5-40 SYRINGE (ML) INJECTION EVERY 12 HOURS SCHEDULED
Status: CANCELLED | OUTPATIENT
Start: 2025-04-22

## 2025-04-22 RX ORDER — SODIUM CHLORIDE 9 MG/ML
INJECTION, SOLUTION INTRAVENOUS PRN
Status: CANCELLED | OUTPATIENT
Start: 2025-04-22

## 2025-04-22 RX ORDER — SODIUM CHLORIDE 0.9 % (FLUSH) 0.9 %
5-40 SYRINGE (ML) INJECTION PRN
Status: CANCELLED | OUTPATIENT
Start: 2025-04-22

## 2025-04-22 RX ORDER — SODIUM CHLORIDE 9 MG/ML
INJECTION, SOLUTION INTRAVENOUS CONTINUOUS
Status: CANCELLED | OUTPATIENT
Start: 2025-04-22

## 2025-04-22 NOTE — TELEPHONE ENCOUNTER
Rajat Haywood is scheduled for Right Lower Extremity IR Angiogram with Left Groin Access with Dr Collado on 4/25/25 at 8:00. Pt agreed to date/time.     Surgery instructions are as follows:  - Arrive to Same Day Surgery at [facility] at 6:00  am  - NPO after midnight the night before surgery  - Continue aspirin  - Will need  upon discharge     All instructions discussed with pt, he verbalized understanding. He denied questions or concerns at this time.     Primary insurance is [employer]. Authorization received, [id number] .

## 2025-04-23 LAB
BUN / CREAT RATIO: 21 (ref 7–25)
BUN BLDV-MCNC: 15 MG/DL (ref 3–29)
CALCIUM SERPL-MCNC: 9 MG/DL (ref 8.5–10.5)
CHLORIDE BLD-SCNC: 106 MEQ/L (ref 96–110)
CO2: 16 MEQ/L (ref 19–32)
CREAT SERPL-MCNC: 0.7 MG/DL (ref 0.5–1.2)
ESTIMATED GLOMERULAR FILTRATION RATE CREATININE EQUATION: 97 MLS/MIN/1.73M2
FASTING STATUS: ABNORMAL
GLUCOSE BLD-MCNC: 97 MG/DL (ref 70–99)
POTASSIUM SERPL-SCNC: ABNORMAL MEQ/L (ref 3.4–5.3)
SODIUM BLD-SCNC: 144 MEQ/L (ref 135–148)

## 2025-04-24 ENCOUNTER — TELEPHONE (OUTPATIENT)
Age: 75
End: 2025-04-24

## 2025-04-24 LAB
BUN / CREAT RATIO: 20 (ref 7–25)
BUN BLDV-MCNC: 16 MG/DL (ref 3–29)
CALCIUM SERPL-MCNC: 9.4 MG/DL (ref 8.5–10.5)
CHLORIDE BLD-SCNC: 105 MEQ/L (ref 96–110)
CO2: 19 MEQ/L (ref 19–32)
CREAT SERPL-MCNC: 0.8 MG/DL (ref 0.5–1.2)
ESTIMATED GLOMERULAR FILTRATION RATE CREATININE EQUATION: 93 MLS/MIN/1.73M2
FASTING STATUS: NORMAL
GLUCOSE BLD-MCNC: 96 MG/DL (ref 70–99)
POTASSIUM SERPL-SCNC: 4.3 MEQ/L (ref 3.4–5.3)
SODIUM BLD-SCNC: 142 MEQ/L (ref 135–148)

## 2025-04-24 NOTE — TELEPHONE ENCOUNTER
Patient is scheduled for an Angiogram with you tomorrow 4/25/25. We were notified by nursing home  that patient is also scheduled for a Right foot second toe amputation. Please review progress note from Dr Gumaro Elizabeth that is performing this procedure. Office stated that they still wanted angiogram performed.

## 2025-04-25 ENCOUNTER — APPOINTMENT (OUTPATIENT)
Age: 75
End: 2025-04-25
Attending: SURGERY
Payer: OTHER GOVERNMENT

## 2025-04-25 ENCOUNTER — HOSPITAL ENCOUNTER (OUTPATIENT)
Dept: INTERVENTIONAL RADIOLOGY/VASCULAR | Age: 75
Discharge: NURSING, REHAB OR LONG-TERM CARE | End: 2025-04-25
Attending: SURGERY | Admitting: SURGERY
Payer: OTHER GOVERNMENT

## 2025-04-25 ENCOUNTER — APPOINTMENT (OUTPATIENT)
Dept: INTERVENTIONAL RADIOLOGY/VASCULAR | Age: 75
End: 2025-04-25
Attending: SURGERY
Payer: OTHER GOVERNMENT

## 2025-04-25 ENCOUNTER — PREP FOR PROCEDURE (OUTPATIENT)
Age: 75
End: 2025-04-25

## 2025-04-25 ENCOUNTER — TELEPHONE (OUTPATIENT)
Age: 75
End: 2025-04-25

## 2025-04-25 ENCOUNTER — ANESTHESIA EVENT (OUTPATIENT)
Dept: OPERATING ROOM | Age: 75
End: 2025-04-25
Payer: OTHER GOVERNMENT

## 2025-04-25 VITALS
HEART RATE: 62 BPM | SYSTOLIC BLOOD PRESSURE: 122 MMHG | BODY MASS INDEX: 22.05 KG/M2 | RESPIRATION RATE: 16 BRPM | TEMPERATURE: 97.6 F | HEIGHT: 68 IN | DIASTOLIC BLOOD PRESSURE: 60 MMHG | WEIGHT: 145.5 LBS | OXYGEN SATURATION: 99 %

## 2025-04-25 DIAGNOSIS — I70.221 CRITICAL LIMB ISCHEMIA OF RIGHT LOWER EXTREMITY (HCC): ICD-10-CM

## 2025-04-25 DIAGNOSIS — I96 GANGRENE OF TOE OF RIGHT FOOT (HCC): ICD-10-CM

## 2025-04-25 DIAGNOSIS — I70.261 CRITICAL LIMB ISCHEMIA OF RIGHT LOWER EXTREMITY WITH GANGRENE (HCC): ICD-10-CM

## 2025-04-25 DIAGNOSIS — R01.1 HEART MURMUR: ICD-10-CM

## 2025-04-25 DIAGNOSIS — S91.301D OPEN WOUND OF RIGHT FOOT, SUBSEQUENT ENCOUNTER: Primary | ICD-10-CM

## 2025-04-25 DIAGNOSIS — I73.9 PAD (PERIPHERAL ARTERY DISEASE): Chronic | ICD-10-CM

## 2025-04-25 LAB
ABO GROUP BLD: NORMAL
ANION GAP SERPL CALC-SCNC: 13 MEQ/L (ref 8–16)
BUN SERPL-MCNC: 16 MG/DL (ref 8–23)
CALCIUM SERPL-MCNC: 9 MG/DL (ref 8.8–10.2)
CHLORIDE SERPL-SCNC: 103 MEQ/L (ref 98–111)
CO2 SERPL-SCNC: 20 MEQ/L (ref 22–29)
CREAT SERPL-MCNC: 0.6 MG/DL (ref 0.7–1.2)
DEPRECATED RDW RBC AUTO: 54 FL (ref 35–45)
ECHO AO ASC DIAM: 2.7 CM
ECHO AO ASCENDING AORTA INDEX: 1.52 CM/M2
ECHO AV CUSP MM: 1.4 CM
ECHO AV MEAN GRADIENT: 5 MMHG
ECHO AV MEAN VELOCITY: 1.1 M/S
ECHO AV PEAK GRADIENT: 10 MMHG
ECHO AV PEAK VELOCITY: 1.6 M/S
ECHO AV VELOCITY RATIO: 0.69
ECHO AV VTI: 36 CM
ECHO BSA: 1.77 M2
ECHO EST RA PRESSURE: 8 MMHG
ECHO LA AREA 2C: 14.2 CM2
ECHO LA AREA 4C: 12.6 CM2
ECHO LA DIAMETER INDEX: 1.74 CM/M2
ECHO LA DIAMETER: 3.1 CM
ECHO LA MAJOR AXIS: 4.8 CM
ECHO LA MINOR AXIS: 4.4 CM
ECHO LA VOL BP: 32 ML (ref 18–58)
ECHO LA VOL MOD A2C: 37 ML (ref 18–58)
ECHO LA VOL MOD A4C: 26 ML (ref 18–58)
ECHO LA VOL/BSA BIPLANE: 18 ML/M2 (ref 16–34)
ECHO LA VOLUME INDEX MOD A2C: 21 ML/M2 (ref 16–34)
ECHO LA VOLUME INDEX MOD A4C: 15 ML/M2 (ref 16–34)
ECHO LV E' LATERAL VELOCITY: 7.5 CM/S
ECHO LV E' SEPTAL VELOCITY: 7.4 CM/S
ECHO LV EDV A2C: 110 ML
ECHO LV EDV A4C: 95 ML
ECHO LV EDV INDEX A4C: 53 ML/M2
ECHO LV EDV NDEX A2C: 62 ML/M2
ECHO LV EF PHYSICIAN: 60 %
ECHO LV EJECTION FRACTION A2C: 60 %
ECHO LV EJECTION FRACTION A4C: 61 %
ECHO LV EJECTION FRACTION BIPLANE: 60 % (ref 55–100)
ECHO LV ESV A2C: 45 ML
ECHO LV ESV A4C: 37 ML
ECHO LV ESV INDEX A2C: 25 ML/M2
ECHO LV ESV INDEX A4C: 21 ML/M2
ECHO LV FRACTIONAL SHORTENING: 30 % (ref 28–44)
ECHO LV INTERNAL DIMENSION DIASTOLE INDEX: 2.47 CM/M2
ECHO LV INTERNAL DIMENSION DIASTOLIC: 4.4 CM (ref 4.2–5.9)
ECHO LV INTERNAL DIMENSION SYSTOLIC INDEX: 1.74 CM/M2
ECHO LV INTERNAL DIMENSION SYSTOLIC: 3.1 CM
ECHO LV ISOVOLUMETRIC RELAXATION TIME (IVRT): 106 MS
ECHO LV IVSD: 0.8 CM (ref 0.6–1)
ECHO LV MASS 2D: 109.4 G (ref 88–224)
ECHO LV MASS INDEX 2D: 61.5 G/M2 (ref 49–115)
ECHO LV POSTERIOR WALL DIASTOLIC: 0.8 CM (ref 0.6–1)
ECHO LV RELATIVE WALL THICKNESS RATIO: 0.36
ECHO LVOT AV VTI INDEX: 0.7
ECHO LVOT MEAN GRADIENT: 3 MMHG
ECHO LVOT PEAK GRADIENT: 5 MMHG
ECHO LVOT PEAK VELOCITY: 1.1 M/S
ECHO LVOT VTI: 25.1 CM
ECHO MV A VELOCITY: 0.8 M/S
ECHO MV E DECELERATION TIME (DT): 308 MS
ECHO MV E VELOCITY: 0.67 M/S
ECHO MV E/A RATIO: 0.84
ECHO MV E/E' LATERAL: 8.93
ECHO MV E/E' RATIO (AVERAGED): 8.99
ECHO MV E/E' SEPTAL: 9.05
ECHO PULMONARY ARTERY END DIASTOLIC PRESSURE: 7 MMHG
ECHO PV MAX VELOCITY: 0.7 M/S
ECHO PV PEAK GRADIENT: 2 MMHG
ECHO PV REGURGITANT MAX VELOCITY: 1.3 M/S
ECHO RV INTERNAL DIMENSION: 3 CM
ECHO RV TAPSE: 2.3 CM (ref 1.7–?)
ECHO TV E WAVE: 0.5 M/S
ERYTHROCYTE [DISTWIDTH] IN BLOOD BY AUTOMATED COUNT: 18.7 % (ref 11.5–14.5)
GFR SERPL CREATININE-BSD FRML MDRD: > 90 ML/MIN/1.73M2
GLUCOSE SERPL-MCNC: 145 MG/DL (ref 74–109)
HCT VFR BLD AUTO: 57.6 % (ref 42–52)
HGB BLD-MCNC: 18.2 GM/DL (ref 14–18)
IAT IGG-SP REAG SERPL QL: NORMAL
MCH RBC QN AUTO: 27.3 PG (ref 26–33)
MCHC RBC AUTO-ENTMCNC: 31.6 GM/DL (ref 32.2–35.5)
MCV RBC AUTO: 86.4 FL (ref 80–94)
PLATELET # BLD AUTO: 303 THOU/MM3 (ref 130–400)
PMV BLD AUTO: 9.7 FL (ref 9.4–12.4)
POTASSIUM SERPL-SCNC: 4.4 MEQ/L (ref 3.5–5.2)
RBC # BLD AUTO: 6.67 MILL/MM3 (ref 4.7–6.1)
RH BLD: NORMAL
SODIUM SERPL-SCNC: 136 MEQ/L (ref 135–145)
WBC # BLD AUTO: 6.6 THOU/MM3 (ref 4.8–10.8)

## 2025-04-25 PROCEDURE — 75710 ARTERY X-RAYS ARM/LEG: CPT

## 2025-04-25 PROCEDURE — 86885 COOMBS TEST INDIRECT QUAL: CPT

## 2025-04-25 PROCEDURE — 93306 TTE W/DOPPLER COMPLETE: CPT | Performed by: INTERNAL MEDICINE

## 2025-04-25 PROCEDURE — 36246 INS CATH ABD/L-EXT ART 2ND: CPT

## 2025-04-25 PROCEDURE — 80048 BASIC METABOLIC PNL TOTAL CA: CPT

## 2025-04-25 PROCEDURE — C1887 CATHETER, GUIDING: HCPCS

## 2025-04-25 PROCEDURE — 86900 BLOOD TYPING SEROLOGIC ABO: CPT

## 2025-04-25 PROCEDURE — 93970 EXTREMITY STUDY: CPT

## 2025-04-25 PROCEDURE — 76937 US GUIDE VASCULAR ACCESS: CPT

## 2025-04-25 PROCEDURE — 36415 COLL VENOUS BLD VENIPUNCTURE: CPT

## 2025-04-25 PROCEDURE — 6360000002 HC RX W HCPCS: Performed by: SURGERY

## 2025-04-25 PROCEDURE — 6360000004 HC RX CONTRAST MEDICATION: Performed by: SURGERY

## 2025-04-25 PROCEDURE — 86901 BLOOD TYPING SEROLOGIC RH(D): CPT

## 2025-04-25 PROCEDURE — 2580000003 HC RX 258: Performed by: PHYSICIAN ASSISTANT

## 2025-04-25 PROCEDURE — 85027 COMPLETE CBC AUTOMATED: CPT

## 2025-04-25 PROCEDURE — 93306 TTE W/DOPPLER COMPLETE: CPT

## 2025-04-25 RX ORDER — HYDROXYZINE HYDROCHLORIDE 10 MG/1
10 TABLET, FILM COATED ORAL EVERY 4 HOURS PRN
Status: ON HOLD | COMMUNITY

## 2025-04-25 RX ORDER — CEPHALEXIN 500 MG/1
500 CAPSULE ORAL 3 TIMES DAILY
Status: ON HOLD | COMMUNITY
End: 2025-05-01

## 2025-04-25 RX ORDER — ACETAMINOPHEN 325 MG/1
650 TABLET ORAL EVERY 4 HOURS PRN
Status: DISCONTINUED | OUTPATIENT
Start: 2025-04-25 | End: 2025-04-25 | Stop reason: HOSPADM

## 2025-04-25 RX ORDER — MIDAZOLAM HYDROCHLORIDE 1 MG/ML
INJECTION, SOLUTION INTRAMUSCULAR; INTRAVENOUS PRN
Status: COMPLETED | OUTPATIENT
Start: 2025-04-25 | End: 2025-04-25

## 2025-04-25 RX ORDER — HYDROCODONE BITARTRATE AND ACETAMINOPHEN 5; 325 MG/1; MG/1
1 TABLET ORAL EVERY 6 HOURS PRN
Status: ON HOLD | COMMUNITY

## 2025-04-25 RX ORDER — SODIUM CHLORIDE 0.9 % (FLUSH) 0.9 %
5-40 SYRINGE (ML) INJECTION PRN
Status: DISCONTINUED | OUTPATIENT
Start: 2025-04-25 | End: 2025-04-25 | Stop reason: HOSPADM

## 2025-04-25 RX ORDER — SODIUM CHLORIDE 9 MG/ML
INJECTION, SOLUTION INTRAVENOUS CONTINUOUS
Status: DISCONTINUED | OUTPATIENT
Start: 2025-04-25 | End: 2025-04-25 | Stop reason: HOSPADM

## 2025-04-25 RX ORDER — IOPAMIDOL 612 MG/ML
INJECTION, SOLUTION INTRAVASCULAR PRN
Status: COMPLETED | OUTPATIENT
Start: 2025-04-25 | End: 2025-04-25

## 2025-04-25 RX ORDER — HYDROCODONE BITARTRATE AND ACETAMINOPHEN 5; 325 MG/1; MG/1
1 TABLET ORAL EVERY 4 HOURS PRN
Refills: 0 | Status: DISCONTINUED | OUTPATIENT
Start: 2025-04-25 | End: 2025-04-25 | Stop reason: HOSPADM

## 2025-04-25 RX ORDER — LIDOCAINE HYDROCHLORIDE 20 MG/ML
INJECTION, SOLUTION EPIDURAL; INFILTRATION; INTRACAUDAL; PERINEURAL PRN
Status: COMPLETED | OUTPATIENT
Start: 2025-04-25 | End: 2025-04-25

## 2025-04-25 RX ORDER — SODIUM CHLORIDE 9 MG/ML
INJECTION, SOLUTION INTRAVENOUS CONTINUOUS
Status: DISCONTINUED | OUTPATIENT
Start: 2025-04-25 | End: 2025-04-25

## 2025-04-25 RX ORDER — SODIUM CHLORIDE 0.9 % (FLUSH) 0.9 %
5-40 SYRINGE (ML) INJECTION EVERY 12 HOURS SCHEDULED
Status: DISCONTINUED | OUTPATIENT
Start: 2025-04-25 | End: 2025-04-25 | Stop reason: HOSPADM

## 2025-04-25 RX ORDER — FENTANYL CITRATE 50 UG/ML
INJECTION, SOLUTION INTRAMUSCULAR; INTRAVENOUS PRN
Status: COMPLETED | OUTPATIENT
Start: 2025-04-25 | End: 2025-04-25

## 2025-04-25 RX ORDER — SODIUM CHLORIDE 9 MG/ML
INJECTION, SOLUTION INTRAVENOUS PRN
Status: DISCONTINUED | OUTPATIENT
Start: 2025-04-25 | End: 2025-04-25 | Stop reason: HOSPADM

## 2025-04-25 RX ADMIN — IOPAMIDOL 30 ML: 612 INJECTION, SOLUTION INTRAVENOUS at 08:20

## 2025-04-25 RX ADMIN — SODIUM CHLORIDE: 0.9 INJECTION, SOLUTION INTRAVENOUS at 06:56

## 2025-04-25 RX ADMIN — FENTANYL CITRATE 25 MCG: 50 INJECTION, SOLUTION INTRAMUSCULAR; INTRAVENOUS at 07:52

## 2025-04-25 RX ADMIN — MIDAZOLAM 1 MG: 1 INJECTION INTRAMUSCULAR; INTRAVENOUS at 07:52

## 2025-04-25 RX ADMIN — LIDOCAINE HYDROCHLORIDE 5 ML: 20 INJECTION, SOLUTION EPIDURAL; INFILTRATION; INTRACAUDAL; PERINEURAL at 07:58

## 2025-04-25 NOTE — PROGRESS NOTES
0738 Patient received in IR for arteriogram procedure.  0738 This procedure has been fully reviewed with the patient and written informed consent has been obtained prior to arrival.  0744 Patient prepped for procedure.  0752 1mg versed, 25mcg fentanyl  0758 Procedure started with Dr. Collado.  0758 Access to left femoral artery with use of sonosite.   0810 Procedure completed; patient tolerated well.   0816 Sheath pulled, manual pressure applied.  0832 Manual pressure released.  Bacitracin oint, gauze, and op site to left femoral site; area soft to touch with no bleeding noted.   0836 Patient on bed; comfort ensured. Left femoral dressing remains dry and intact with area soft.   0836 Patient taken to 2E via bed with family at bedside. Left femoral dressing remains dry and intact with area soft. Pt alert and oriented x3; follows commands. Skin pink, warm, and dry. Respirations easy, regular, and nonlabored.

## 2025-04-25 NOTE — TELEPHONE ENCOUNTER
Rajat Haywood is scheduled for Right Lower Extremity Femoral to Tibial Artery Bypass with vein with Dr Collado on 4/30/25 at 1:00. Pt agreed to date/time.     Surgery instructions are as follows:  - Arrive to Same Day Surgery at [facility] at 11:00 am  - NPO after midnight the night before surgery  - Hold Metformin morning of surgery  - Continue aspirin  - Will need  upon discharge     All instructions discussed with pt, he verbalized understanding. He denied questions or concerns at this time. Surgery date and time confirmed with  at [facility] on 4/25/25     Primary insurance is [employer]. Authorization is [id number] 4/14/25-10/13/25

## 2025-04-25 NOTE — DISCHARGE INSTRUCTIONS
Call if T >101.5 F, fevers, chills, suspicious drainage from wound, redness around your wound or any other questions or concerns.    No heavy lifting > 10 lbs about the waist for 2 weeks. You can take walks and climb stairs. OK to drive in 72 hours. You may remove the dressing in two days.

## 2025-04-25 NOTE — PROGRESS NOTES
Vein mapping shows possible good right GSV to use for a bypass, no thrombus. We will attempt a right common femoral to anterior tibial artery bypass on Wednesday April 30th. Discussed with the patient and RN.     I also asked anesthesia to pre-op (PAT) the patient this AM. Echocardiogram also pending. Patient has a significant smoking Hx but no chest pain or anginal Sx.

## 2025-04-25 NOTE — PROGRESS NOTES
Patient admitted to 2E15  via wheelchair for angiogram.  Patient NPO. Patient unaccompanied .  Vital signs obtained.   Assessment and data collection intiated.   Oriented to room.  Policies and procedures for 2E explained.   All questions answered with no further questions at this time.   Fall prevention and safety precautions discussed with patient.

## 2025-04-25 NOTE — PROGRESS NOTES
TRANSFER - OUT REPORT:    Verbal report given to Anna RN(name) on Rajat Hayowod being transferred to (unit) for routine progression of patient care       Report consisted of patient's Situation, Background, Assessment and   Recommendations(SBAR).     Information from the following report(s) Nurse Handoff Report, Surgery Report, and MAR was reviewed with the receiving nurse.    Opportunity for questions and clarification was provided.      Patient transported with:   Monitor

## 2025-04-25 NOTE — PROGRESS NOTES
Returned to 2E15.  Monitor attached showing SR. Dressing to left groin dry and intact.  Hemostasis maintained, no bleeding,swelling, or edema noted.  0.9NSS infusing with approx 800 ml remaining

## 2025-04-25 NOTE — INTERVAL H&P NOTE
Update History & Physical    The patient's History and Physical of April 16,  was reviewed with the patient and I examined the patient. There was no change. The surgical site was confirmed by the patient and me.     Plan: The risks, benefits, expected outcome, and alternative to the recommended procedure have been discussed with the patient. Patient understands and wants to proceed with the procedure. The patient understands the risks and benefits of the operation including bleeding, infection, nerve injury, myocardial infarction and stroke. The patient wishes to proceed with the surgery. To IR for a right lower extremity angiogram via left groin access. No changes since office visit. Right 2nd toe gangrene unchanged.     Electronically signed by Garret Collado MD on 4/25/2025 at 7:34 AM

## 2025-04-25 NOTE — OP NOTE
Operative Note      Patient: Rajat Haywood  YOB: 1950  MRN: [id number]    Date of Procedure: 4/25/2025    Pre-op Dx: PAD, right lower extremity critical limb ischemia, right 2nd toe dry gangrene, tobacco use, schizophrenia, DM2 (controlled), HL, HTN    Post-Op Diagnosis: Same       Procedures:   Right lower extremity angiogram with catheter in the right common femoral artery  Aortoiliac angiogram with catheter in the right iliacs and aorta  Ultrasound guided left common femoral artery percutaneous access    Surgeon(s):  Garret Collado MD    Assistant:   * No surgical staff found *    Anesthesia:  Monitored moderate conscious sedation by Dr. Garret Collado with monitoring of respiratory rate, heart rate, blood pressure, end-tidal volume CO2 and oxygen saturation. IV sedation with 1 mg of IV Versed, 25 mcg of Fentanyl. Local anesthetic with 1% lidocaine. Total sedation time: 18 minutes. Moderate (conscious) sedation was administered under the attending physician's direction, and continuous monitoring was performed by a trained nurse specialist.      FLUOROSCOPY TIME:  0.6 minutes     CONTRAST USED:  30 mL     HEPARIN GIVEN:  none    Estimated Blood Loss (mL): Minimal, < 10 mL    Complications: None    Specimens:   * No specimens in log *    Implants:  * No implants in log *      Drains:   [REMOVED] External Urinary Catheter (Removed)       Findings:  Infection Present At Time Of Surgery (PATOS) (choose all levels that have infection present):  No infection present  Other Findings: see below    Detailed Description of Procedure:      HISTORY OF PRESENT ILLNESS: Rajat Haywood is a 76 yo male with right 2nd toe gangrene. The patient understands the risks and benefits of the operation including bleeding, infection, myocardial infarction and stroke. The patient wishes to proceed with the surgery.      OPERATIVE NOTE:  The patient was taken back to the OR where time-out was called, verifying procedure site, the

## 2025-04-25 NOTE — TELEPHONE ENCOUNTER
Surgery Scheduling Form   [facility] [address]  [place], [place] [address]    Phone * [phone]   Surgical Scheduling Direct line Phone * [phone]  Fax * [phone]      Rajat Haywood      1950    male    [address] [facility] [address]   Marital Status:         Home Phone: [phone]   Cell Phone:   Telephone Information:   Mobile [phone]              Surgeon: Dr Collado  Surgery Date:4/30/25  Time: 1:00     Procedure: Right Lower Extremity Femoral to Tibial Artery Bypass with vein  Inpatient       Diagnosis: I70.221, I70.261, I73.9    Important Medical History:     Special Inst/Equip:     CPT Codes: 42168    Latex Allergy:   no Cardiac Device:  no    Anesthesia Type: General    Case Location:  OR     Preadmission Testing: Phone Call      Need Preop Cardiac Clearance:   no    Does patient have Cardiologist/physician?       : _Tiffany Quiles CMA________________            Date:___4/25/25_____

## 2025-04-28 LAB
BACTERIA, URINE: NORMAL /HPF
BILIRUBIN, URINE: NEGATIVE MG/DL
BUN / CREAT RATIO: 14 (ref 7–25)
BUN BLDV-MCNC: 13 MG/DL (ref 3–29)
CALCIUM SERPL-MCNC: 9.3 MG/DL (ref 8.5–10.5)
CHLORIDE BLD-SCNC: 103 MEQ/L (ref 96–110)
CLARITY, UA: CLEAR
CO2: 23 MEQ/L (ref 19–32)
COLOR, UA: YELLOW
CREAT SERPL-MCNC: 0.9 MG/DL (ref 0.5–1.2)
EPITHELIAL CELLS, UA: NORMAL /HPF (ref 0–5)
ERYTHROCYTES URINE: NORMAL /HPF (ref 0–2)
ESTIMATED GLOMERULAR FILTRATION RATE CREATININE EQUATION: 90 MLS/MIN/1.73M2
FASTING STATUS: ABNORMAL
GLUCOSE BLD-MCNC: 119 MG/DL (ref 70–99)
GLUCOSE URINE: NEGATIVE MG/DL
HCT VFR BLD CALC: 52.1 % (ref 37.5–51)
HEMOGLOBIN: 16.5 G/DL (ref 13–17.7)
KETONES, URINE: NEGATIVE MG/DL
LEUKOCYTE ESTERASE, URINE: NEGATIVE
LEUKOCYTES, UA: NORMAL /HPF (ref 0–5)
MCH RBC QN AUTO: 27 PG (ref 26–34)
MCHC RBC AUTO-ENTMCNC: 31.7 G/DL (ref 30.7–35.5)
MCV RBC AUTO: 85.1 FL (ref 80–100)
NITRITE, URINE: NEGATIVE
PDW BLD-RTO: 18.4 %
PH, URINE: 6 (ref 4.5–8)
PLATELET # BLD: 296 K/UL (ref 140–400)
PMV BLD AUTO: 10 FL (ref 7.2–11.7)
POTASSIUM SERPL-SCNC: 4.3 MEQ/L (ref 3.4–5.3)
PROTEIN, URINE: NEGATIVE MG/DL
RBC # BLD: 6.12 M/UL (ref 4.14–5.8)
SODIUM BLD-SCNC: 141 MEQ/L (ref 135–148)
SPECIFIC GRAVITY UA: 1.01 (ref 1–1.03)
URINE HGB: NEGATIVE MG/DL
UROBILINOGEN, URINE: <2 MG/DL (ref 0–2)
WBC # BLD: 6.9 K/UL (ref 3.5–10.9)

## 2025-04-28 RX ORDER — SODIUM CHLORIDE 9 MG/ML
INJECTION, SOLUTION INTRAVENOUS PRN
Status: CANCELLED | OUTPATIENT
Start: 2025-04-28

## 2025-04-28 RX ORDER — SODIUM CHLORIDE 9 MG/ML
INJECTION, SOLUTION INTRAVENOUS CONTINUOUS
Status: CANCELLED | OUTPATIENT
Start: 2025-04-28

## 2025-04-28 RX ORDER — SODIUM CHLORIDE 0.9 % (FLUSH) 0.9 %
5-40 SYRINGE (ML) INJECTION EVERY 12 HOURS SCHEDULED
Status: CANCELLED | OUTPATIENT
Start: 2025-04-28

## 2025-04-28 RX ORDER — SODIUM CHLORIDE 0.9 % (FLUSH) 0.9 %
5-40 SYRINGE (ML) INJECTION PRN
Status: CANCELLED | OUTPATIENT
Start: 2025-04-28

## 2025-04-30 ENCOUNTER — HOSPITAL ENCOUNTER (INPATIENT)
Age: 75
LOS: 7 days | Discharge: NURSING, REHAB OR LONG-TERM CARE | DRG: 253 | End: 2025-05-07
Attending: SURGERY | Admitting: SURGERY
Payer: OTHER GOVERNMENT

## 2025-04-30 ENCOUNTER — ANESTHESIA (OUTPATIENT)
Dept: OPERATING ROOM | Age: 75
End: 2025-04-30
Payer: OTHER GOVERNMENT

## 2025-04-30 DIAGNOSIS — L97.524 SKIN ULCER OF LEFT GREAT TOE WITH NECROSIS OF BONE (HCC): Primary | ICD-10-CM

## 2025-04-30 DIAGNOSIS — I96 GANGRENE OF TOE OF RIGHT FOOT (HCC): ICD-10-CM

## 2025-04-30 DIAGNOSIS — L08.9 TOE INFECTION: ICD-10-CM

## 2025-04-30 LAB
ABO GROUP BLD: NORMAL
BASE EXCESS BLDA CALC-SCNC: -5.6 MMOL/L (ref -2.5–2.5)
BDY SITE: ABNORMAL
CA-I BLD ISE-SCNC: 1.14 MMOL/L (ref 1.12–1.32)
COLLECTED BY:: ABNORMAL
DEVICE: ABNORMAL
GLUCOSE BLD STRIP.AUTO-MCNC: 163 MG/DL (ref 70–108)
GLUCOSE BLD-MCNC: 145 MG/DL (ref 70–108)
HCO3 BLDA-SCNC: 20 MMOL/L (ref 23–28)
HEMOGLOBIN FINGERSTICK, POC: 14.4 G/DL (ref 14–18)
IAT IGG-SP REAG SERPL QL: NORMAL
PCO2 BLDA: 36 MMHG (ref 35–45)
PH BLDA: 7.34 [PH] (ref 7.35–7.45)
PO2 BLDA: 199 MMHG (ref 71–104)
POTASSIUM BLD-SCNC: 3.7 MEQ/L (ref 3.5–4.9)
RH BLD: NORMAL
SAO2 % BLDA: 100 %
SODIUM BLD-SCNC: 145 MEQ/L (ref 138–146)
VENTILATION MODE VENT: ABNORMAL

## 2025-04-30 PROCEDURE — 84132 ASSAY OF SERUM POTASSIUM: CPT

## 2025-04-30 PROCEDURE — 7100000000 HC PACU RECOVERY - FIRST 15 MIN: Performed by: SURGERY

## 2025-04-30 PROCEDURE — 3600000003 HC SURGERY LEVEL 3 BASE: Performed by: SURGERY

## 2025-04-30 PROCEDURE — 82330 ASSAY OF CALCIUM: CPT

## 2025-04-30 PROCEDURE — 2500000003 HC RX 250 WO HCPCS: Performed by: NURSE ANESTHETIST, CERTIFIED REGISTERED

## 2025-04-30 PROCEDURE — 6370000000 HC RX 637 (ALT 250 FOR IP): Performed by: SURGERY

## 2025-04-30 PROCEDURE — 36415 COLL VENOUS BLD VENIPUNCTURE: CPT

## 2025-04-30 PROCEDURE — 2709999900 HC NON-CHARGEABLE SUPPLY: Performed by: SURGERY

## 2025-04-30 PROCEDURE — 2500000003 HC RX 250 WO HCPCS: Performed by: SURGERY

## 2025-04-30 PROCEDURE — 2580000003 HC RX 258: Performed by: PHYSICIAN ASSISTANT

## 2025-04-30 PROCEDURE — 0YU70KZ SUPPLEMENT RIGHT FEMORAL REGION WITH NONAUTOLOGOUS TISSUE SUBSTITUTE, OPEN APPROACH: ICD-10-PCS | Performed by: SURGERY

## 2025-04-30 PROCEDURE — 3700000000 HC ANESTHESIA ATTENDED CARE: Performed by: SURGERY

## 2025-04-30 PROCEDURE — 3700000001 HC ADD 15 MINUTES (ANESTHESIA): Performed by: SURGERY

## 2025-04-30 PROCEDURE — 86900 BLOOD TYPING SEROLOGIC ABO: CPT

## 2025-04-30 PROCEDURE — 82803 BLOOD GASES ANY COMBINATION: CPT

## 2025-04-30 PROCEDURE — 2580000003 HC RX 258: Performed by: NURSE ANESTHETIST, CERTIFIED REGISTERED

## 2025-04-30 PROCEDURE — 86901 BLOOD TYPING SEROLOGIC RH(D): CPT

## 2025-04-30 PROCEDURE — 7100000001 HC PACU RECOVERY - ADDTL 15 MIN: Performed by: SURGERY

## 2025-04-30 PROCEDURE — 041K09Q BYPASS RIGHT FEMORAL ARTERY TO LOWER EXTREMITY ARTERY WITH AUTOLOGOUS VENOUS TISSUE, OPEN APPROACH: ICD-10-PCS | Performed by: SURGERY

## 2025-04-30 PROCEDURE — 6360000002 HC RX W HCPCS: Performed by: NURSE ANESTHETIST, CERTIFIED REGISTERED

## 2025-04-30 PROCEDURE — 85018 HEMOGLOBIN: CPT

## 2025-04-30 PROCEDURE — 6360000002 HC RX W HCPCS: Performed by: SURGERY

## 2025-04-30 PROCEDURE — 2500000003 HC RX 250 WO HCPCS: Performed by: PHYSICIAN ASSISTANT

## 2025-04-30 PROCEDURE — 82947 ASSAY GLUCOSE BLOOD QUANT: CPT

## 2025-04-30 PROCEDURE — 86885 COOMBS TEST INDIRECT QUAL: CPT

## 2025-04-30 PROCEDURE — 3600000013 HC SURGERY LEVEL 3 ADDTL 15MIN: Performed by: SURGERY

## 2025-04-30 PROCEDURE — 2060000000 HC ICU INTERMEDIATE R&B

## 2025-04-30 PROCEDURE — 35566 ART BYP FEM-ANT-POST TIB/PRL: CPT | Performed by: PHYSICIAN ASSISTANT

## 2025-04-30 PROCEDURE — 04CK0ZZ EXTIRPATION OF MATTER FROM RIGHT FEMORAL ARTERY, OPEN APPROACH: ICD-10-PCS | Performed by: SURGERY

## 2025-04-30 PROCEDURE — 6360000002 HC RX W HCPCS: Performed by: PHYSICIAN ASSISTANT

## 2025-04-30 PROCEDURE — 84295 ASSAY OF SERUM SODIUM: CPT

## 2025-04-30 PROCEDURE — 82948 REAGENT STRIP/BLOOD GLUCOSE: CPT

## 2025-04-30 PROCEDURE — C1768 GRAFT, VASCULAR: HCPCS | Performed by: SURGERY

## 2025-04-30 DEVICE — XENOSURE BIOLOGIC PATCH, 0.8CM X 8CM, EIFU
Type: IMPLANTABLE DEVICE | Status: FUNCTIONAL
Brand: XENOSURE BIOLOGIC PATCH

## 2025-04-30 RX ORDER — SODIUM CHLORIDE, SODIUM LACTATE, POTASSIUM CHLORIDE, CALCIUM CHLORIDE 600; 310; 30; 20 MG/100ML; MG/100ML; MG/100ML; MG/100ML
INJECTION, SOLUTION INTRAVENOUS CONTINUOUS
Status: DISCONTINUED | OUTPATIENT
Start: 2025-04-30 | End: 2025-05-01

## 2025-04-30 RX ORDER — GLUCAGON 1 MG/ML
1 KIT INJECTION PRN
Status: DISCONTINUED | OUTPATIENT
Start: 2025-04-30 | End: 2025-05-07 | Stop reason: HOSPADM

## 2025-04-30 RX ORDER — HYDRALAZINE HYDROCHLORIDE 20 MG/ML
10 INJECTION INTRAMUSCULAR; INTRAVENOUS EVERY 4 HOURS PRN
Status: DISCONTINUED | OUTPATIENT
Start: 2025-04-30 | End: 2025-05-07 | Stop reason: HOSPADM

## 2025-04-30 RX ORDER — FENTANYL CITRATE 50 UG/ML
INJECTION, SOLUTION INTRAMUSCULAR; INTRAVENOUS
Status: DISCONTINUED | OUTPATIENT
Start: 2025-04-30 | End: 2025-04-30 | Stop reason: SDUPTHER

## 2025-04-30 RX ORDER — DEXAMETHASONE SODIUM PHOSPHATE 4 MG/ML
INJECTION, SOLUTION INTRA-ARTICULAR; INTRALESIONAL; INTRAMUSCULAR; INTRAVENOUS; SOFT TISSUE
Status: DISCONTINUED | OUTPATIENT
Start: 2025-04-30 | End: 2025-04-30 | Stop reason: SDUPTHER

## 2025-04-30 RX ORDER — HEPARIN SODIUM 1000 [USP'U]/ML
INJECTION, SOLUTION INTRAVENOUS; SUBCUTANEOUS
Status: DISCONTINUED | OUTPATIENT
Start: 2025-04-30 | End: 2025-04-30 | Stop reason: SDUPTHER

## 2025-04-30 RX ORDER — LISINOPRIL 10 MG/1
10 TABLET ORAL DAILY
Status: DISCONTINUED | OUTPATIENT
Start: 2025-05-01 | End: 2025-05-03

## 2025-04-30 RX ORDER — POLYETHYLENE GLYCOL 3350 17 G/17G
17 POWDER, FOR SOLUTION ORAL DAILY PRN
Status: DISCONTINUED | OUTPATIENT
Start: 2025-04-30 | End: 2025-05-07 | Stop reason: HOSPADM

## 2025-04-30 RX ORDER — SODIUM CHLORIDE 9 MG/ML
INJECTION, SOLUTION INTRAVENOUS PRN
Status: DISCONTINUED | OUTPATIENT
Start: 2025-04-30 | End: 2025-05-07 | Stop reason: HOSPADM

## 2025-04-30 RX ORDER — DOCUSATE SODIUM 100 MG/1
100 CAPSULE, LIQUID FILLED ORAL 2 TIMES DAILY
Status: DISCONTINUED | OUTPATIENT
Start: 2025-05-01 | End: 2025-05-07 | Stop reason: HOSPADM

## 2025-04-30 RX ORDER — ONDANSETRON 2 MG/ML
INJECTION INTRAMUSCULAR; INTRAVENOUS
Status: DISCONTINUED | OUTPATIENT
Start: 2025-04-30 | End: 2025-04-30 | Stop reason: SDUPTHER

## 2025-04-30 RX ORDER — DEXTROSE MONOHYDRATE 100 MG/ML
INJECTION, SOLUTION INTRAVENOUS CONTINUOUS PRN
Status: DISCONTINUED | OUTPATIENT
Start: 2025-04-30 | End: 2025-05-07 | Stop reason: HOSPADM

## 2025-04-30 RX ORDER — PROTAMINE SULFATE 10 MG/ML
INJECTION, SOLUTION INTRAVENOUS
Status: DISCONTINUED | OUTPATIENT
Start: 2025-04-30 | End: 2025-04-30 | Stop reason: SDUPTHER

## 2025-04-30 RX ORDER — SODIUM CHLORIDE 9 MG/ML
INJECTION, SOLUTION INTRAVENOUS CONTINUOUS
Status: DISCONTINUED | OUTPATIENT
Start: 2025-04-30 | End: 2025-04-30 | Stop reason: HOSPADM

## 2025-04-30 RX ORDER — HYDROCODONE BITARTRATE AND ACETAMINOPHEN 5; 325 MG/1; MG/1
1 TABLET ORAL EVERY 4 HOURS PRN
Refills: 0 | Status: DISCONTINUED | OUTPATIENT
Start: 2025-04-30 | End: 2025-05-07 | Stop reason: HOSPADM

## 2025-04-30 RX ORDER — SODIUM CHLORIDE 0.9 % (FLUSH) 0.9 %
5-40 SYRINGE (ML) INJECTION PRN
Status: DISCONTINUED | OUTPATIENT
Start: 2025-04-30 | End: 2025-05-07 | Stop reason: HOSPADM

## 2025-04-30 RX ORDER — ATORVASTATIN CALCIUM 80 MG/1
80 TABLET, FILM COATED ORAL DAILY
Status: DISCONTINUED | OUTPATIENT
Start: 2025-05-01 | End: 2025-05-07 | Stop reason: HOSPADM

## 2025-04-30 RX ORDER — CEFAZOLIN SODIUM 1 G/3ML
INJECTION, POWDER, FOR SOLUTION INTRAMUSCULAR; INTRAVENOUS
Status: DISCONTINUED | OUTPATIENT
Start: 2025-04-30 | End: 2025-04-30 | Stop reason: SDUPTHER

## 2025-04-30 RX ORDER — FENTANYL CITRATE 50 UG/ML
50 INJECTION, SOLUTION INTRAMUSCULAR; INTRAVENOUS EVERY 5 MIN PRN
Status: DISCONTINUED | OUTPATIENT
Start: 2025-04-30 | End: 2025-04-30 | Stop reason: HOSPADM

## 2025-04-30 RX ORDER — ONDANSETRON 2 MG/ML
4 INJECTION INTRAMUSCULAR; INTRAVENOUS EVERY 6 HOURS PRN
Status: DISCONTINUED | OUTPATIENT
Start: 2025-04-30 | End: 2025-05-07 | Stop reason: HOSPADM

## 2025-04-30 RX ORDER — ACETAMINOPHEN 325 MG/1
650 TABLET ORAL EVERY 4 HOURS PRN
Status: DISCONTINUED | OUTPATIENT
Start: 2025-04-30 | End: 2025-05-07 | Stop reason: HOSPADM

## 2025-04-30 RX ORDER — PALIPERIDONE 3 MG/1
12 TABLET, EXTENDED RELEASE ORAL DAILY
Status: DISCONTINUED | OUTPATIENT
Start: 2025-05-01 | End: 2025-05-07 | Stop reason: HOSPADM

## 2025-04-30 RX ORDER — PHENYLEPHRINE HCL IN 0.9% NACL 1 MG/10 ML
SYRINGE (ML) INTRAVENOUS
Status: DISCONTINUED | OUTPATIENT
Start: 2025-04-30 | End: 2025-04-30 | Stop reason: SDUPTHER

## 2025-04-30 RX ORDER — SODIUM CHLORIDE, SODIUM LACTATE, POTASSIUM CHLORIDE, CALCIUM CHLORIDE 600; 310; 30; 20 MG/100ML; MG/100ML; MG/100ML; MG/100ML
INJECTION, SOLUTION INTRAVENOUS
Status: DISCONTINUED | OUTPATIENT
Start: 2025-04-30 | End: 2025-04-30 | Stop reason: SDUPTHER

## 2025-04-30 RX ORDER — SODIUM CHLORIDE 0.9 % (FLUSH) 0.9 %
5-40 SYRINGE (ML) INJECTION EVERY 12 HOURS SCHEDULED
Status: DISCONTINUED | OUTPATIENT
Start: 2025-04-30 | End: 2025-04-30 | Stop reason: HOSPADM

## 2025-04-30 RX ORDER — MIDAZOLAM HYDROCHLORIDE 1 MG/ML
INJECTION, SOLUTION INTRAMUSCULAR; INTRAVENOUS
Status: DISCONTINUED | OUTPATIENT
Start: 2025-04-30 | End: 2025-04-30 | Stop reason: SDUPTHER

## 2025-04-30 RX ORDER — FINASTERIDE 5 MG/1
5 TABLET, FILM COATED ORAL DAILY
Status: DISCONTINUED | OUTPATIENT
Start: 2025-05-01 | End: 2025-05-07 | Stop reason: HOSPADM

## 2025-04-30 RX ORDER — GABAPENTIN 100 MG/1
100 CAPSULE ORAL 3 TIMES DAILY
Status: DISCONTINUED | OUTPATIENT
Start: 2025-04-30 | End: 2025-05-07 | Stop reason: HOSPADM

## 2025-04-30 RX ORDER — BUPROPION HYDROCHLORIDE 300 MG/1
300 TABLET ORAL EVERY MORNING
Status: DISCONTINUED | OUTPATIENT
Start: 2025-05-01 | End: 2025-05-07 | Stop reason: HOSPADM

## 2025-04-30 RX ORDER — ONDANSETRON 4 MG/1
4 TABLET, ORALLY DISINTEGRATING ORAL EVERY 8 HOURS PRN
Status: DISCONTINUED | OUTPATIENT
Start: 2025-04-30 | End: 2025-05-07 | Stop reason: HOSPADM

## 2025-04-30 RX ORDER — SODIUM CHLORIDE 9 MG/ML
INJECTION, SOLUTION INTRAVENOUS PRN
Status: DISCONTINUED | OUTPATIENT
Start: 2025-04-30 | End: 2025-04-30 | Stop reason: HOSPADM

## 2025-04-30 RX ORDER — PROPOFOL 10 MG/ML
INJECTION, EMULSION INTRAVENOUS
Status: DISCONTINUED | OUTPATIENT
Start: 2025-04-30 | End: 2025-04-30 | Stop reason: SDUPTHER

## 2025-04-30 RX ORDER — HYDROMORPHONE HYDROCHLORIDE 2 MG/ML
INJECTION, SOLUTION INTRAMUSCULAR; INTRAVENOUS; SUBCUTANEOUS
Status: DISCONTINUED | OUTPATIENT
Start: 2025-04-30 | End: 2025-04-30 | Stop reason: SDUPTHER

## 2025-04-30 RX ORDER — SODIUM CHLORIDE 0.9 % (FLUSH) 0.9 %
5-40 SYRINGE (ML) INJECTION PRN
Status: DISCONTINUED | OUTPATIENT
Start: 2025-04-30 | End: 2025-04-30 | Stop reason: HOSPADM

## 2025-04-30 RX ORDER — SODIUM CHLORIDE 0.9 % (FLUSH) 0.9 %
5-40 SYRINGE (ML) INJECTION EVERY 12 HOURS SCHEDULED
Status: DISCONTINUED | OUTPATIENT
Start: 2025-04-30 | End: 2025-05-07 | Stop reason: HOSPADM

## 2025-04-30 RX ORDER — SENNOSIDES 8.6 MG
325 CAPSULE ORAL ONCE
Status: COMPLETED | OUTPATIENT
Start: 2025-04-30 | End: 2025-04-30

## 2025-04-30 RX ORDER — ROCURONIUM BROMIDE 10 MG/ML
INJECTION, SOLUTION INTRAVENOUS
Status: DISCONTINUED | OUTPATIENT
Start: 2025-04-30 | End: 2025-04-30 | Stop reason: SDUPTHER

## 2025-04-30 RX ORDER — OXYBUTYNIN CHLORIDE 10 MG/1
10 TABLET, EXTENDED RELEASE ORAL DAILY
Status: DISCONTINUED | OUTPATIENT
Start: 2025-05-01 | End: 2025-05-07 | Stop reason: HOSPADM

## 2025-04-30 RX ORDER — MULTIVITAMIN WITH IRON
1 TABLET ORAL DAILY
Status: DISCONTINUED | OUTPATIENT
Start: 2025-05-01 | End: 2025-05-07 | Stop reason: HOSPADM

## 2025-04-30 RX ORDER — ASPIRIN 81 MG/1
81 TABLET, CHEWABLE ORAL DAILY
Status: DISCONTINUED | OUTPATIENT
Start: 2025-05-01 | End: 2025-05-07 | Stop reason: HOSPADM

## 2025-04-30 RX ORDER — PANTOPRAZOLE SODIUM 40 MG/1
40 TABLET, DELAYED RELEASE ORAL
Status: DISCONTINUED | OUTPATIENT
Start: 2025-05-01 | End: 2025-05-07 | Stop reason: HOSPADM

## 2025-04-30 RX ORDER — LABETALOL HYDROCHLORIDE 5 MG/ML
10 INJECTION, SOLUTION INTRAVENOUS
Status: DISCONTINUED | OUTPATIENT
Start: 2025-04-30 | End: 2025-05-07 | Stop reason: HOSPADM

## 2025-04-30 RX ORDER — DOXAZOSIN 4 MG/1
4 TABLET ORAL NIGHTLY
Status: DISCONTINUED | OUTPATIENT
Start: 2025-05-01 | End: 2025-05-03

## 2025-04-30 RX ORDER — HYDROXYZINE HYDROCHLORIDE 10 MG/1
10 TABLET, FILM COATED ORAL EVERY 4 HOURS PRN
Status: DISCONTINUED | OUTPATIENT
Start: 2025-04-30 | End: 2025-05-07 | Stop reason: HOSPADM

## 2025-04-30 RX ORDER — LIDOCAINE HYDROCHLORIDE 20 MG/ML
INJECTION, SOLUTION INTRAVENOUS
Status: DISCONTINUED | OUTPATIENT
Start: 2025-04-30 | End: 2025-04-30 | Stop reason: SDUPTHER

## 2025-04-30 RX ORDER — EPHEDRINE SULFATE/0.9% NACL/PF 25 MG/5 ML
SYRINGE (ML) INTRAVENOUS
Status: DISCONTINUED | OUTPATIENT
Start: 2025-04-30 | End: 2025-04-30 | Stop reason: SDUPTHER

## 2025-04-30 RX ORDER — INDOMETHACIN 25 MG/1
CAPSULE ORAL
Status: DISCONTINUED | OUTPATIENT
Start: 2025-04-30 | End: 2025-04-30 | Stop reason: SDUPTHER

## 2025-04-30 RX ORDER — INSULIN LISPRO 100 [IU]/ML
0-8 INJECTION, SOLUTION INTRAVENOUS; SUBCUTANEOUS
Status: DISCONTINUED | OUTPATIENT
Start: 2025-04-30 | End: 2025-05-07 | Stop reason: HOSPADM

## 2025-04-30 RX ADMIN — DEXAMETHASONE SODIUM PHOSPHATE 10 MG: 4 INJECTION, SOLUTION INTRAMUSCULAR; INTRAVENOUS at 15:52

## 2025-04-30 RX ADMIN — WATER 2000 MG: 1 INJECTION INTRAMUSCULAR; INTRAVENOUS; SUBCUTANEOUS at 15:05

## 2025-04-30 RX ADMIN — Medication 200 MCG: at 19:22

## 2025-04-30 RX ADMIN — WATER 2000 MG: 1 INJECTION INTRAMUSCULAR; INTRAVENOUS; SUBCUTANEOUS at 23:13

## 2025-04-30 RX ADMIN — Medication 100 MCG: at 17:55

## 2025-04-30 RX ADMIN — Medication 100 MCG: at 18:16

## 2025-04-30 RX ADMIN — Medication 100 MCG: at 19:08

## 2025-04-30 RX ADMIN — ONDANSETRON 4 MG: 2 INJECTION, SOLUTION INTRAMUSCULAR; INTRAVENOUS at 20:38

## 2025-04-30 RX ADMIN — Medication 100 MG: at 15:13

## 2025-04-30 RX ADMIN — HEPARIN SODIUM 6000 UNITS: 1000 INJECTION INTRAVENOUS; SUBCUTANEOUS at 18:29

## 2025-04-30 RX ADMIN — Medication 100 MCG: at 19:19

## 2025-04-30 RX ADMIN — ROCURONIUM BROMIDE 50 MG: 10 INJECTION, SOLUTION INTRAVENOUS at 15:13

## 2025-04-30 RX ADMIN — HYDROMORPHONE HYDROCHLORIDE 0.4 MG: 2 INJECTION INTRAMUSCULAR; INTRAVENOUS; SUBCUTANEOUS at 21:33

## 2025-04-30 RX ADMIN — HEPARIN SODIUM 2000 UNITS: 1000 INJECTION INTRAVENOUS; SUBCUTANEOUS at 19:22

## 2025-04-30 RX ADMIN — Medication 100 MCG: at 17:41

## 2025-04-30 RX ADMIN — HYDROMORPHONE HYDROCHLORIDE 0.4 MG: 2 INJECTION INTRAMUSCULAR; INTRAVENOUS; SUBCUTANEOUS at 20:16

## 2025-04-30 RX ADMIN — HYDROMORPHONE HYDROCHLORIDE 0.4 MG: 2 INJECTION INTRAMUSCULAR; INTRAVENOUS; SUBCUTANEOUS at 21:42

## 2025-04-30 RX ADMIN — SODIUM BICARBONATE 50 MEQ: 84 INJECTION, SOLUTION INTRAVENOUS at 19:26

## 2025-04-30 RX ADMIN — Medication 200 MCG: at 19:37

## 2025-04-30 RX ADMIN — Medication 50 MCG: at 17:03

## 2025-04-30 RX ADMIN — CEFAZOLIN 2 G: 1 INJECTION, POWDER, FOR SOLUTION INTRAMUSCULAR; INTRAVENOUS at 19:05

## 2025-04-30 RX ADMIN — PROTAMINE SULFATE 25 MG: 10 INJECTION, SOLUTION INTRAVENOUS at 19:51

## 2025-04-30 RX ADMIN — EPHEDRINE SULFATE 10 MG: 5 INJECTION INTRAVENOUS at 15:44

## 2025-04-30 RX ADMIN — PROPOFOL 60 MG: 10 INJECTION, EMULSION INTRAVENOUS at 19:28

## 2025-04-30 RX ADMIN — SODIUM CHLORIDE: 9 INJECTION, SOLUTION INTRAVENOUS at 18:16

## 2025-04-30 RX ADMIN — Medication 300 MCG: at 15:27

## 2025-04-30 RX ADMIN — HYDROMORPHONE HYDROCHLORIDE 0.4 MG: 2 INJECTION INTRAMUSCULAR; INTRAVENOUS; SUBCUTANEOUS at 19:31

## 2025-04-30 RX ADMIN — Medication 100 MCG: at 15:45

## 2025-04-30 RX ADMIN — Medication 200 MCG: at 15:20

## 2025-04-30 RX ADMIN — PROPOFOL 140 MG: 10 INJECTION, EMULSION INTRAVENOUS at 15:13

## 2025-04-30 RX ADMIN — SODIUM CHLORIDE: 9 INJECTION, SOLUTION INTRAVENOUS at 12:57

## 2025-04-30 RX ADMIN — Medication 200 MCG: at 20:57

## 2025-04-30 RX ADMIN — SODIUM CHLORIDE, POTASSIUM CHLORIDE, SODIUM LACTATE AND CALCIUM CHLORIDE: 600; 310; 30; 20 INJECTION, SOLUTION INTRAVENOUS at 19:17

## 2025-04-30 RX ADMIN — MIDAZOLAM 2 MG: 1 INJECTION INTRAMUSCULAR; INTRAVENOUS at 15:09

## 2025-04-30 RX ADMIN — Medication 200 MCG: at 16:03

## 2025-04-30 RX ADMIN — Medication 200 MCG: at 15:15

## 2025-04-30 RX ADMIN — EPHEDRINE SULFATE 25 MG: 5 INJECTION INTRAVENOUS at 15:21

## 2025-04-30 RX ADMIN — HYDROCODONE BITARTRATE AND ACETAMINOPHEN 1 TABLET: 5; 325 TABLET ORAL at 23:43

## 2025-04-30 RX ADMIN — EPHEDRINE SULFATE 15 MG: 5 INJECTION INTRAVENOUS at 15:38

## 2025-04-30 RX ADMIN — Medication 100 MCG: at 16:51

## 2025-04-30 RX ADMIN — HYDROMORPHONE HYDROCHLORIDE 0.4 MG: 2 INJECTION INTRAMUSCULAR; INTRAVENOUS; SUBCUTANEOUS at 20:42

## 2025-04-30 RX ADMIN — ONDANSETRON 4 MG: 2 INJECTION, SOLUTION INTRAMUSCULAR; INTRAVENOUS at 15:52

## 2025-04-30 RX ADMIN — Medication 200 MCG: at 15:33

## 2025-04-30 RX ADMIN — Medication 200 MCG: at 19:44

## 2025-04-30 RX ADMIN — ASPIRIN 325 MG: 325 TABLET, COATED ORAL at 23:13

## 2025-04-30 RX ADMIN — FENTANYL CITRATE 100 MCG: 50 INJECTION, SOLUTION INTRAMUSCULAR; INTRAVENOUS at 15:09

## 2025-04-30 RX ADMIN — Medication 200 MCG: at 20:02

## 2025-04-30 ASSESSMENT — PAIN DESCRIPTION - FREQUENCY
FREQUENCY: CONTINUOUS

## 2025-04-30 ASSESSMENT — PAIN DESCRIPTION - ONSET
ONSET: ON-GOING

## 2025-04-30 ASSESSMENT — PAIN DESCRIPTION - LOCATION
LOCATION: GROIN;LEG
LOCATION: LEG
LOCATION: GROIN;LEG

## 2025-04-30 ASSESSMENT — ENCOUNTER SYMPTOMS: SHORTNESS OF BREATH: 1

## 2025-04-30 ASSESSMENT — PAIN DESCRIPTION - PAIN TYPE
TYPE: SURGICAL PAIN

## 2025-04-30 ASSESSMENT — PAIN DESCRIPTION - DESCRIPTORS
DESCRIPTORS: SORE
DESCRIPTORS: SORE
DESCRIPTORS: ACHING;SORE
DESCRIPTORS: ACHING;SORE;DISCOMFORT
DESCRIPTORS: ACHING;SORE

## 2025-04-30 ASSESSMENT — PAIN DESCRIPTION - ORIENTATION
ORIENTATION: RIGHT

## 2025-04-30 ASSESSMENT — PAIN SCALES - GENERAL
PAINLEVEL_OUTOF10: 5
PAINLEVEL_OUTOF10: 4
PAINLEVEL_OUTOF10: 4

## 2025-04-30 ASSESSMENT — PAIN - FUNCTIONAL ASSESSMENT
PAIN_FUNCTIONAL_ASSESSMENT: 0-10
PAIN_FUNCTIONAL_ASSESSMENT: WONG-BAKER FACES

## 2025-04-30 NOTE — H&P
CC: Right foot gangrene, PAD    HPI: Rajat Haywood is an 75 y.o. male with right foot gangrene, PAD. He presents for a right common femoral to anterior tibial artery bypass. No changes since his angiogram. He was cleared by anesthesia for surgery last week.      Review of Systems   Constitutional:  Negative for chills and diaphoresis.   HENT:  Negative for rhinorrhea.    Eyes:  Negative for visual disturbance.   Respiratory:  Negative for chest tightness and shortness of breath.    Cardiovascular:  Negative for chest pain.   Gastrointestinal:  Negative for abdominal pain.   Neurological:  Negative for speech difficulty and light-headedness.   Psychiatric/Behavioral:  Negative for behavioral problems and confusion.           Allergies: No Known Allergies      Current Facility-Administered Medications   Medication Dose Route Frequency Provider Last Rate Last Admin    sodium chloride flush 0.9 % injection 5-40 mL  5-40 mL IntraVENous 2 times per day Luis Felipe Barr PA-C        sodium chloride flush 0.9 % injection 5-40 mL  5-40 mL IntraVENous PRN Luis Felipe Barr PA-C        0.9 % sodium chloride infusion   IntraVENous PRN Luis Felipe Barr PA-C        ceFAZolin (ANCEF) 2,000 mg in sterile water 20 mL IV syringe  2,000 mg IntraVENous On Call to OR Luis Felipe Barr PA-C        0.9 % sodium chloride infusion   IntraVENous Continuous Luis Felipe Barr PA-C 125 mL/hr at 04/30/25 1257 New Bag at 04/30/25 1257         Patient Active Problem List   Diagnosis    BPH (benign prostatic hyperplasia)    DDD (degenerative disc disease), cervical    DDD (degenerative disc disease), lumbar    Depression    DM2 (diabetes mellitus, type 2) ([facility])    Dyslipidemia    Erectile dysfunction    Hypertension, essential    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

## 2025-04-30 NOTE — ANESTHESIA PRE PROCEDURE
region    • Spinal stenosis, lumbar        Past Surgical History:        Procedure Laterality Date   • COLONOSCOPY     • UPPER GASTROINTESTINAL ENDOSCOPY         Social History:    Social History     Tobacco Use   • Smoking status: Never   • Smokeless tobacco: Never   Substance Use Topics   • Alcohol use: Never                                Counseling given: Not Answered      Vital Signs (Current):   Vitals:    04/30/25 1204   BP: 96/68   Pulse: 77   Resp: 18   Temp: 97.6 °F (36.4 °C)   TempSrc: Tympanic   SpO2: 96%   Weight: 62.1 kg (137 lb)   Height: 1.715 m (5' 7.5\")                                              BP Readings from Last 3 Encounters:   04/30/25 96/68   04/25/25 122/60   04/16/25 (!) 118/59       NPO Status:                                                                                 BMI:   Wt Readings from Last 3 Encounters:   04/30/25 62.1 kg (137 lb)   04/25/25 66 kg (145 lb 8.1 oz)   04/16/25 56.4 kg (124 lb 4.8 oz)     Body mass index is 21.14 kg/m².    CBC:   Lab Results   Component Value Date/Time    WBC 6.9 04/28/2025 05:27 AM    RBC 6.12 04/28/2025 05:27 AM    HGB 16.5 04/28/2025 05:27 AM    HCT 52.1 04/28/2025 05:27 AM    MCV 85.1 04/28/2025 05:27 AM    RDW 18.4 04/28/2025 05:27 AM     04/28/2025 05:27 AM       CMP:   Lab Results   Component Value Date/Time     04/28/2025 05:27 AM    K 4.3 04/28/2025 05:27 AM    K 4.0 03/22/2025 08:21 AM     04/28/2025 05:27 AM    CO2 23 04/28/2025 05:27 AM    BUN 13 04/28/2025 05:27 AM    CREATININE 0.9 04/28/2025 05:27 AM    LABGLOM > 90 04/25/2025 06:46 AM    LABGLOM >90 09/18/2020 04:30 PM    GLUCOSE 119 04/28/2025 05:27 AM    GLUCOSE 98 04/23/2025 02:50 PM    CALCIUM 9.3 04/28/2025 05:27 AM    BILITOT 0.5 03/31/2025 03:15 PM    ALKPHOS 117 03/31/2025 03:15 PM    AST 21 03/31/2025 03:15 PM    ALT 27 03/31/2025 03:15 PM       POC Tests: No results for input(s): \"POCGLU\", \"POCNA\", \"POCK\", \"POCCL\", \"POCBUN\", \"POCHEMO\", \"POCHCT\" in the

## 2025-04-30 NOTE — ANESTHESIA PROCEDURE NOTES
Arterial Line:    An arterial line was placed in the OR for the following indication(s): continuous blood pressure monitoring and blood sampling needed.    A 20 gauge (size) (length), Arrow (type) catheter was placed, Seldinger technique used, into the right radial artery, secured by tape and Tegaderm.  Anesthesia type: General    Events:  EBL < 5mL.  Anesthesiologist: Perry Garcia DO  Performed: Anesthesiologist

## 2025-05-01 ENCOUNTER — APPOINTMENT (OUTPATIENT)
Dept: GENERAL RADIOLOGY | Age: 75
DRG: 253 | End: 2025-05-01
Attending: SURGERY
Payer: OTHER GOVERNMENT

## 2025-05-01 LAB
ANION GAP SERPL CALC-SCNC: 8 MEQ/L (ref 8–16)
BUN SERPL-MCNC: 16 MG/DL (ref 8–23)
CALCIUM SERPL-MCNC: 8.2 MG/DL (ref 8.8–10.2)
CHLORIDE SERPL-SCNC: 107 MEQ/L (ref 98–111)
CO2 SERPL-SCNC: 23 MEQ/L (ref 22–29)
CREAT SERPL-MCNC: 0.7 MG/DL (ref 0.7–1.2)
DEPRECATED RDW RBC AUTO: 55.4 FL (ref 35–45)
DEPRECATED RDW RBC AUTO: 57.4 FL (ref 35–45)
ERYTHROCYTE [DISTWIDTH] IN BLOOD BY AUTOMATED COUNT: 18.2 % (ref 11.5–14.5)
ERYTHROCYTE [DISTWIDTH] IN BLOOD BY AUTOMATED COUNT: 18.6 % (ref 11.5–14.5)
GFR SERPL CREATININE-BSD FRML MDRD: > 90 ML/MIN/1.73M2
GLUCOSE BLD STRIP.AUTO-MCNC: 136 MG/DL (ref 70–108)
GLUCOSE BLD STRIP.AUTO-MCNC: 152 MG/DL (ref 70–108)
GLUCOSE BLD STRIP.AUTO-MCNC: 171 MG/DL (ref 70–108)
GLUCOSE BLD STRIP.AUTO-MCNC: 98 MG/DL (ref 70–108)
GLUCOSE SERPL-MCNC: 185 MG/DL (ref 74–109)
HCT VFR BLD AUTO: 47.5 % (ref 42–52)
HCT VFR BLD AUTO: 47.7 % (ref 42–52)
HGB BLD-MCNC: 14.3 GM/DL (ref 14–18)
HGB BLD-MCNC: 14.9 GM/DL (ref 14–18)
MAGNESIUM SERPL-MCNC: 1.6 MG/DL (ref 1.6–2.6)
MCH RBC QN AUTO: 26.7 PG (ref 26–33)
MCH RBC QN AUTO: 27.2 PG (ref 26–33)
MCHC RBC AUTO-ENTMCNC: 30.1 GM/DL (ref 32.2–35.5)
MCHC RBC AUTO-ENTMCNC: 31.2 GM/DL (ref 32.2–35.5)
MCV RBC AUTO: 87.2 FL (ref 80–94)
MCV RBC AUTO: 88.8 FL (ref 80–94)
PLATELET # BLD AUTO: 326 THOU/MM3 (ref 130–400)
PLATELET # BLD AUTO: 328 THOU/MM3 (ref 130–400)
PMV BLD AUTO: 9.5 FL (ref 9.4–12.4)
PMV BLD AUTO: 9.6 FL (ref 9.4–12.4)
POTASSIUM SERPL-SCNC: 4.2 MEQ/L (ref 3.5–5.2)
RBC # BLD AUTO: 5.35 MILL/MM3 (ref 4.7–6.1)
RBC # BLD AUTO: 5.47 MILL/MM3 (ref 4.7–6.1)
SODIUM SERPL-SCNC: 138 MEQ/L (ref 135–145)
WBC # BLD AUTO: 10.3 THOU/MM3 (ref 4.8–10.8)
WBC # BLD AUTO: 13.4 THOU/MM3 (ref 4.8–10.8)

## 2025-05-01 PROCEDURE — 83735 ASSAY OF MAGNESIUM: CPT

## 2025-05-01 PROCEDURE — 82948 REAGENT STRIP/BLOOD GLUCOSE: CPT

## 2025-05-01 PROCEDURE — 2500000003 HC RX 250 WO HCPCS: Performed by: SURGERY

## 2025-05-01 PROCEDURE — 2580000003 HC RX 258: Performed by: SURGERY

## 2025-05-01 PROCEDURE — 80048 BASIC METABOLIC PNL TOTAL CA: CPT

## 2025-05-01 PROCEDURE — 85027 COMPLETE CBC AUTOMATED: CPT

## 2025-05-01 PROCEDURE — 2580000003 HC RX 258: Performed by: INTERNAL MEDICINE

## 2025-05-01 PROCEDURE — 99024 POSTOP FOLLOW-UP VISIT: CPT | Performed by: SURGERY

## 2025-05-01 PROCEDURE — 87070 CULTURE OTHR SPECIMN AEROBIC: CPT

## 2025-05-01 PROCEDURE — 6360000002 HC RX W HCPCS: Performed by: SURGERY

## 2025-05-01 PROCEDURE — 6370000000 HC RX 637 (ALT 250 FOR IP): Performed by: SURGERY

## 2025-05-01 PROCEDURE — 87205 SMEAR GRAM STAIN: CPT

## 2025-05-01 PROCEDURE — 2060000000 HC ICU INTERMEDIATE R&B

## 2025-05-01 PROCEDURE — 87075 CULTR BACTERIA EXCEPT BLOOD: CPT

## 2025-05-01 PROCEDURE — 36415 COLL VENOUS BLD VENIPUNCTURE: CPT

## 2025-05-01 PROCEDURE — 6360000002 HC RX W HCPCS: Performed by: INTERNAL MEDICINE

## 2025-05-01 PROCEDURE — 73630 X-RAY EXAM OF FOOT: CPT

## 2025-05-01 RX ORDER — MAGNESIUM SULFATE HEPTAHYDRATE 40 MG/ML
2000 INJECTION, SOLUTION INTRAVENOUS ONCE
Status: COMPLETED | OUTPATIENT
Start: 2025-05-01 | End: 2025-05-01

## 2025-05-01 RX ORDER — ACETAMINOPHEN 325 MG/1
650 TABLET ORAL 2 TIMES DAILY
COMMUNITY

## 2025-05-01 RX ORDER — ACETAMINOPHEN 650 MG
TABLET, EXTENDED RELEASE ORAL DAILY
Status: DISCONTINUED | OUTPATIENT
Start: 2025-05-01 | End: 2025-05-03

## 2025-05-01 RX ORDER — SODIUM CHLORIDE, SODIUM LACTATE, POTASSIUM CHLORIDE, AND CALCIUM CHLORIDE .6; .31; .03; .02 G/100ML; G/100ML; G/100ML; G/100ML
500 INJECTION, SOLUTION INTRAVENOUS ONCE
Status: COMPLETED | OUTPATIENT
Start: 2025-05-01 | End: 2025-05-01

## 2025-05-01 RX ORDER — GABAPENTIN 100 MG/1
100 CAPSULE ORAL 3 TIMES DAILY
COMMUNITY

## 2025-05-01 RX ORDER — HEPARIN SODIUM 5000 [USP'U]/ML
5000 INJECTION, SOLUTION INTRAVENOUS; SUBCUTANEOUS EVERY 8 HOURS SCHEDULED
Status: DISCONTINUED | OUTPATIENT
Start: 2025-05-03 | End: 2025-05-03 | Stop reason: ALTCHOICE

## 2025-05-01 RX ADMIN — FINASTERIDE 5 MG: 5 TABLET, FILM COATED ORAL at 09:27

## 2025-05-01 RX ADMIN — PANTOPRAZOLE SODIUM 40 MG: 40 TABLET, DELAYED RELEASE ORAL at 06:13

## 2025-05-01 RX ADMIN — GABAPENTIN 100 MG: 100 CAPSULE ORAL at 14:40

## 2025-05-01 RX ADMIN — SODIUM CHLORIDE, PRESERVATIVE FREE 10 ML: 5 INJECTION INTRAVENOUS at 09:27

## 2025-05-01 RX ADMIN — ATORVASTATIN CALCIUM 80 MG: 80 TABLET, FILM COATED ORAL at 09:27

## 2025-05-01 RX ADMIN — BUPROPION HYDROCHLORIDE 300 MG: 300 TABLET, EXTENDED RELEASE ORAL at 09:27

## 2025-05-01 RX ADMIN — SODIUM CHLORIDE, SODIUM LACTATE, POTASSIUM CHLORIDE, AND CALCIUM CHLORIDE: .6; .31; .03; .02 INJECTION, SOLUTION INTRAVENOUS at 00:23

## 2025-05-01 RX ADMIN — WATER 2000 MG: 1 INJECTION INTRAMUSCULAR; INTRAVENOUS; SUBCUTANEOUS at 06:13

## 2025-05-01 RX ADMIN — PIPERACILLIN AND TAZOBACTAM 4500 MG: 4; .5 INJECTION, POWDER, LYOPHILIZED, FOR SOLUTION INTRAVENOUS at 11:34

## 2025-05-01 RX ADMIN — Medication: at 09:33

## 2025-05-01 RX ADMIN — PALIPERIDONE 12 MG: 3 TABLET, EXTENDED RELEASE ORAL at 09:27

## 2025-05-01 RX ADMIN — Medication 1 TABLET: at 09:27

## 2025-05-01 RX ADMIN — HYDROCODONE BITARTRATE AND ACETAMINOPHEN 1 TABLET: 5; 325 TABLET ORAL at 04:09

## 2025-05-01 RX ADMIN — PIPERACILLIN AND TAZOBACTAM 3375 MG: 3; .375 INJECTION, POWDER, FOR SOLUTION INTRAVENOUS at 16:18

## 2025-05-01 RX ADMIN — GABAPENTIN 100 MG: 100 CAPSULE ORAL at 09:27

## 2025-05-01 RX ADMIN — LISINOPRIL 10 MG: 10 TABLET ORAL at 09:27

## 2025-05-01 RX ADMIN — MAGNESIUM SULFATE HEPTAHYDRATE 2000 MG: 40 INJECTION, SOLUTION INTRAVENOUS at 09:38

## 2025-05-01 RX ADMIN — DOCUSATE SODIUM 100 MG: 100 CAPSULE, LIQUID FILLED ORAL at 09:33

## 2025-05-01 RX ADMIN — ASPIRIN 81 MG: 81 TABLET, CHEWABLE ORAL at 09:27

## 2025-05-01 RX ADMIN — SODIUM CHLORIDE, SODIUM LACTATE, POTASSIUM CHLORIDE, AND CALCIUM CHLORIDE 500 ML: .6; .31; .03; .02 INJECTION, SOLUTION INTRAVENOUS at 14:39

## 2025-05-01 RX ADMIN — GABAPENTIN 100 MG: 100 CAPSULE ORAL at 19:29

## 2025-05-01 RX ADMIN — OXYBUTYNIN CHLORIDE 10 MG: 10 TABLET, EXTENDED RELEASE ORAL at 09:27

## 2025-05-01 ASSESSMENT — PAIN SCALES - GENERAL
PAINLEVEL_OUTOF10: 5
PAINLEVEL_OUTOF10: 1
PAINLEVEL_OUTOF10: 0

## 2025-05-01 ASSESSMENT — PAIN DESCRIPTION - FREQUENCY: FREQUENCY: CONTINUOUS

## 2025-05-01 ASSESSMENT — PAIN DESCRIPTION - ORIENTATION
ORIENTATION: RIGHT
ORIENTATION: RIGHT

## 2025-05-01 ASSESSMENT — PAIN DESCRIPTION - ONSET: ONSET: ON-GOING

## 2025-05-01 ASSESSMENT — PAIN DESCRIPTION - LOCATION
LOCATION: LEG
LOCATION: LEG;FOOT

## 2025-05-01 ASSESSMENT — PAIN - FUNCTIONAL ASSESSMENT: PAIN_FUNCTIONAL_ASSESSMENT: PREVENTS OR INTERFERES SOME ACTIVE ACTIVITIES AND ADLS

## 2025-05-01 ASSESSMENT — PAIN DESCRIPTION - PAIN TYPE: TYPE: SURGICAL PAIN

## 2025-05-01 ASSESSMENT — PAIN DESCRIPTION - DESCRIPTORS
DESCRIPTORS: ACHING;SORE
DESCRIPTORS: ACHING;SORE

## 2025-05-01 NOTE — PLAN OF CARE
Problem: Discharge Planning  Goal: Discharge to home or other facility with appropriate resources  5/1/2025 0945 by Sharon Swenson, ARI  Outcome: Progressing  See SW note

## 2025-05-01 NOTE — CONSULTS
[facility]  Podiatric Medicine and Surgery Consultation Note      Rajat Haywood  Medical record number:  [id number]  Age: 75 y.o.   Gender: male  : 1950  Episode date:  2025    Subjective      History of present illness    Patient is a 75 y.o. male with a history of DM type 2, PAD seen bedside today on behalf of Dr Elizabeth. Patient appeared pleasant, was oriented to person, place and time and in no acute distress. Patient was here for a vascular procedure with Dr. Collado. Patient follows with Dr. Elizabeth and recently saw him this past Tuesday.  States the toe is painful and has been necrotic for awhile. States he had discussed that he would need an amputation of the 2nd digit. Patient denies any N/V/F/C/SOB or CP. No other pedal concerns.               Past medical history        Diagnosis Date    Anxiety     BPH (benign prostatic hyperplasia)     DDD (degenerative disc disease), cervical     DDD (degenerative disc disease), lumbar     Depression     DM2 (diabetes mellitus, type 2) ([facility])     Dyslipidemia     Erectile dysfunction     Hypertension, essential     OAB (overactive bladder)     PAD (peripheral artery disease)     Schizophrenia ([facility])     Spinal stenosis, cervical region     Spinal stenosis, lumbar        Past surgical history        Procedure Laterality Date    COLONOSCOPY      FEMORAL-FEMORAL BYPASS GRAFT Right 2025    RIGHT FEMORAL PERONEAL BYPASS performed by Garret Collado MD at [facility] OR    UPPER GASTROINTESTINAL ENDOSCOPY         Family history    History reviewed. No pertinent family history.    Social history    Social History     Tobacco Use    Smoking status: Never    Smokeless tobacco: Never   Vaping Use    Vaping status: Never Used   Substance Use Topics    Alcohol use: Never    Drug use: Never       Allergies    No Known Allergies    Medications    No current facility-administered medications on file prior to encounter.     Current Outpatient Medications on File

## 2025-05-01 NOTE — CONSULTS
CONSULTATION NOTE :ID       Patient - Rajat Haywood,  Age - 75 y.o.    - 1950      Room Number - 4K-04/004-A   MRN -  [id number]   [facility] # - [id number]  Date of Admission -  2025 11:19 AM  Patient's PCP: Sienna Hurst APRN - CNP     Requesting Physician: Garret Collado MD    REASON FOR CONSULTATION   Right foot gangrene   CHIEF COMPLAINT   Gangrene of the toe    HISTORY OF PRESENT ILLNESS       This is a very pleasant 75 y.o. male who was admitted to the hospital and had a by pass surgery to the right lower leg due to limb threatening ischemia. He has gangrenous second toe.he had right common femoral artery to anterior tibial artery bypass with reverse right greater saphenous vein and right common femla artery and profunda femoris endarterectomies. I was asked to evalauate the gangreous digit.  The foot is malodourus. He denies any fever or chills, no pain at rest. He is diabetic,ex-smoker with hx of HTn and erectile dysfunction    PAST MEDICAL  HISTORY       Past Medical History:   Diagnosis Date    Anxiety     BPH (benign prostatic hyperplasia)     DDD (degenerative disc disease), cervical     DDD (degenerative disc disease), lumbar     Depression     DM2 (diabetes mellitus, type 2) ([facility])     Dyslipidemia     Erectile dysfunction     Hypertension, essential     OAB (overactive bladder)     PAD (peripheral artery disease)     Schizophrenia ([facility])     Spinal stenosis, cervical region     Spinal stenosis, lumbar        PAST SURGICAL HISTORY     Past Surgical History:   Procedure Laterality Date    COLONOSCOPY      UPPER GASTROINTESTINAL ENDOSCOPY           MEDICATIONS:       Scheduled Meds:   magnesium sulfate  2,000 mg IntraVENous Once    povidone-iodine   Topical Daily    [START ON 5/3/2025] heparin (porcine)  5,000 Units SubCUTAneous 3 times per day    aspirin  81 mg Oral Daily    atorvastatin  80 mg Oral Daily    buPROPion  300 mg Oral QAM    docusate sodium  100 mg

## 2025-05-01 NOTE — CARE COORDINATION
5/1/25, 9:45 AM EDT  Discharge Planning Evaluation  Social work consult received, patient from [facility].    Patient/Family preference is to return to [facility].    The patient's current payor source at the facility is [facility].   Medicare skilled days available: No  Medicare does the patient have a three midnight qualifying stay? No  Insurance precert:  Yes  Spoke with Mariza at the facility.  Patient bed hold: Yes  Anticipated transport plan: Ambulance  Patient's Healthcare Decision Maker: Legal Next of Kin    Readmission Risk Low 0-14, Mod 15-19), High > 20: Readmission Risk Score: 20.5    Current PCP: Sienna Hurst APRN - CNP  PCP verified by CM? Yes    Patient Orientation: Alert and Oriented    Patient Cognition: Alert  History Provided by: Patient    Advance Directives:      Code Status: Full Code   Patient's Primary Decision Maker is: Legal Next of Kin    Primary Decision Maker: Ban Yarbrough - Brother/Sister - [phone]     Discharge Planning:    Patient lives with:   Type of Home: Skilled Nursing Facility  Primary Care Giver: Other (Comment) (ECF)  Patient Support Systems include: Family Members, Other (Comment) (ECF)   Current Financial resources:    Current community resources: ECF/Home Care  Current services prior to admission: Skilled Nursing Facility            Current DME:              Type of Home Care services:       ADLS  Prior functional level: Assistance with the following:, Bathing, Dressing, Toileting, Cooking, Housework, Shopping, Mobility  Current functional level: Assistance with the following:, Bathing, Dressing, Toileting, Cooking, Housework, Shopping, Mobility    Family can provide assistance at DC: No  Would you like Case Management to discuss the discharge plan with any other family members/significant others, and if so, who? No  Plans to Return to Present Housing: Yes  Other Identified Issues/Barriers to RETURNING to current housing: None  Potential Assistance needed at discharge: Skilled

## 2025-05-01 NOTE — CARE COORDINATION
Case Management Assessment Initial Evaluation    Date/Time of Evaluation: 5/1/2025 7:44 AM  Assessment Completed by: Rajat Anderson RN    If patient is discharged prior to next notation, then this note serves as note for discharge by case management.    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) [I70.261]  Critical limb ischemia of right lower extremity (HCC) [I70.221]  Gangrene of toe of right foot (HCC) [I96]  PAD (peripheral artery disease) [I73.9]                   Date / Time: 4/30/2025 11:19 AM  Location: [facility]04/004     Patient Admission Status: Inpatient   Readmission Risk Low 0-14, Mod 15-19), High > 20: Readmission Risk Score: 19.9    Current PCP: Sienna Hurst APRN - CNP  Health Care Decision Makers:   Primary Decision Maker: Ban Yarbrough - Brother/Sister - [phone]    Additional Case Management Notes:   RLE Critical Limb Ischemia/Wet Gangrene Right 2nd Toe  PMH: Fall  Elevated WBC; monitor  IVF  IV Zosyn  Await SGY  Await PT Eval (then SNF precert)  Procedures:   4/30 Right CFA Tibial Artery Bypass  5/2 Right 2nd Toe Amputation  Patient Goals/Plan/Treatment Preferences: plans return [facility] (precert, therapy following)

## 2025-05-01 NOTE — DISCHARGE INSTR - COC
([facility]) I96    Critical limb ischemia of right lower extremity with gangrene ([facility]) I70.261    Critical limb ischemia of right lower extremity ([facility]) I70.221       Isolation/Infection:   Isolation            No Isolation          Patient Infection Status    None to display              Nurse Assessment:  Last Vital Signs: BP (!) 96/55   Pulse 81   Temp 98.4 °F (36.9 °C) (Oral)   Resp 16   Ht 1.715 m (5' 7.5\")   Wt 62.1 kg (137 lb)   SpO2 97%   BMI 21.14 kg/m²     Last documented pain score (0-10 scale): Pain Level: 0  Last Weight:   Wt Readings from Last 1 Encounters:   04/30/25 62.1 kg (137 lb)     Mental Status:  oriented and alert    IV Access:  - None    Nursing Mobility/ADLs:  Walking   Assisted  Transfer  Assisted  Bathing  Assisted  Dressing  Assisted  Toileting  Assisted  Feeding  Independent  Med Admin  Assisted  Med Delivery   whole    Wound Care Documentation and Therapy:  Puncture 04/25/25 (Active)   Number of days: 6       Wound 03/17/25 Coccyx Left 3 stage 2 on buttocks (Active)   Wound Etiology Pressure Stage 2 04/30/25 2245   Dressing Status Clean;Dry;Intact 04/30/25 2245   Wound Cleansed Wound cleanser 04/30/25 1300   Dressing/Treatment Hydrating gel 04/30/25 2245   Drainage Amount None (dry) 04/30/25 2245   Number of days: 44       Wound 03/18/25 Foot Right right foot -between great toe and 2 nd toe (Active)   Dressing Status Clean;Dry;Intact 04/30/25 2245   Wound Cleansed Betadine/povidone iodine 04/30/25 1303   Dressing/Treatment Ace wrap 04/30/25 2245   Number of days: 44       Wound 04/30/25 Knee Right (Active)   Number of days: 0       Wound 04/30/25 Knee Left (Active)   Number of days: 0       Incision 04/30/25 Groin Right (Active)   Dressing Status Clean;Dry;Intact 04/30/25 2245   Dressing/Treatment Silver dressing 04/30/25 2245   Incision Assessment Other (Comment) 04/30/25 2245   Drainage Amount None (dry) 04/30/25 2245   Odor None 04/30/25 2245   Number of days: 0

## 2025-05-01 NOTE — ANESTHESIA POSTPROCEDURE EVALUATION
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    Kate Phase I: Kate Score: 10    Kate Phase II:      Anesthesia Post Evaluation    Patient location during evaluation: PACU  Patient participation: complete - patient participated  Level of consciousness: awake  Airway patency: patent  Nausea & Vomiting: no vomiting and no nausea  Cardiovascular status: hemodynamically stable  Respiratory status: acceptable  Hydration status: stable  Pain management: adequate    No notable events documented.

## 2025-05-01 NOTE — PLAN OF CARE
Problem: Chronic Conditions and Co-morbidities  Goal: Patient's chronic conditions and co-morbidity symptoms are monitored and maintained or improved  Outcome: Progressing  Flowsheets (Taken 5/1/2025 0011)  Care Plan - Patient's Chronic Conditions and Co-Morbidity Symptoms are Monitored and Maintained or Improved:   Monitor and assess patient's chronic conditions and comorbid symptoms for stability, deterioration, or improvement   Collaborate with multidisciplinary team to address chronic and comorbid conditions and prevent exacerbation or deterioration   Update acute care plan with appropriate goals if chronic or comorbid symptoms are exacerbated and prevent overall improvement and discharge     Problem: Discharge Planning  Goal: Discharge to home or other facility with appropriate resources  Outcome: Progressing  Flowsheets (Taken 5/1/2025 0011)  Discharge to home or other facility with appropriate resources:   Identify barriers to discharge with patient and caregiver   Arrange for needed discharge resources and transportation as appropriate   Identify discharge learning needs (meds, wound care, etc)   Refer to discharge planning if patient needs post-hospital services based on physician order or complex needs related to functional status, cognitive ability or social support system     Problem: Pain  Goal: Verbalizes/displays adequate comfort level or baseline comfort level  Outcome: Progressing  Flowsheets (Taken 5/1/2025 0011)  Verbalizes/displays adequate comfort level or baseline comfort level:   Encourage patient to monitor pain and request assistance   Assess pain using appropriate pain scale   Administer analgesics based on type and severity of pain and evaluate response   Implement non-pharmacological measures as appropriate and evaluate response   Notify Licensed Independent Practitioner if interventions unsuccessful or patient reports new pain     Problem: Safety - Adult  Goal: Free from fall

## 2025-05-02 ENCOUNTER — ANESTHESIA (OUTPATIENT)
Dept: OPERATING ROOM | Age: 75
End: 2025-05-02
Payer: OTHER GOVERNMENT

## 2025-05-02 ENCOUNTER — ANESTHESIA EVENT (OUTPATIENT)
Dept: OPERATING ROOM | Age: 75
End: 2025-05-02
Payer: OTHER GOVERNMENT

## 2025-05-02 LAB
ANION GAP SERPL CALC-SCNC: 7 MEQ/L (ref 8–16)
BUN SERPL-MCNC: 13 MG/DL (ref 8–23)
CALCIUM SERPL-MCNC: 7.9 MG/DL (ref 8.8–10.2)
CHLORIDE SERPL-SCNC: 106 MEQ/L (ref 98–111)
CO2 SERPL-SCNC: 26 MEQ/L (ref 22–29)
CREAT SERPL-MCNC: 0.6 MG/DL (ref 0.7–1.2)
DEPRECATED RDW RBC AUTO: 56.1 FL (ref 35–45)
ERYTHROCYTE [DISTWIDTH] IN BLOOD BY AUTOMATED COUNT: 17.9 % (ref 11.5–14.5)
GFR SERPL CREATININE-BSD FRML MDRD: > 90 ML/MIN/1.73M2
GLUCOSE BLD STRIP.AUTO-MCNC: 114 MG/DL (ref 70–108)
GLUCOSE BLD STRIP.AUTO-MCNC: 122 MG/DL (ref 70–108)
GLUCOSE BLD STRIP.AUTO-MCNC: 127 MG/DL (ref 70–108)
GLUCOSE BLD STRIP.AUTO-MCNC: 129 MG/DL (ref 70–108)
GLUCOSE BLD STRIP.AUTO-MCNC: 162 MG/DL (ref 70–108)
GLUCOSE SERPL-MCNC: 155 MG/DL (ref 74–109)
HCT VFR BLD AUTO: 41.2 % (ref 42–52)
HGB BLD-MCNC: 13 GM/DL (ref 14–18)
MAGNESIUM SERPL-MCNC: 1.9 MG/DL (ref 1.6–2.6)
MCH RBC QN AUTO: 27.5 PG (ref 26–33)
MCHC RBC AUTO-ENTMCNC: 31.6 GM/DL (ref 32.2–35.5)
MCV RBC AUTO: 87.1 FL (ref 80–94)
PLATELET # BLD AUTO: 310 THOU/MM3 (ref 130–400)
PMV BLD AUTO: 9.7 FL (ref 9.4–12.4)
POTASSIUM SERPL-SCNC: 3.8 MEQ/L (ref 3.5–5.2)
RBC # BLD AUTO: 4.73 MILL/MM3 (ref 4.7–6.1)
SODIUM SERPL-SCNC: 139 MEQ/L (ref 135–145)
WBC # BLD AUTO: 9 THOU/MM3 (ref 4.8–10.8)

## 2025-05-02 PROCEDURE — 2500000003 HC RX 250 WO HCPCS: Performed by: NURSE ANESTHETIST, CERTIFIED REGISTERED

## 2025-05-02 PROCEDURE — 85027 COMPLETE CBC AUTOMATED: CPT

## 2025-05-02 PROCEDURE — 2580000003 HC RX 258

## 2025-05-02 PROCEDURE — 51798 US URINE CAPACITY MEASURE: CPT

## 2025-05-02 PROCEDURE — 83735 ASSAY OF MAGNESIUM: CPT

## 2025-05-02 PROCEDURE — APPSS30 APP SPLIT SHARED TIME 16-30 MINUTES: Performed by: PHYSICIAN ASSISTANT

## 2025-05-02 PROCEDURE — 97530 THERAPEUTIC ACTIVITIES: CPT

## 2025-05-02 PROCEDURE — 80048 BASIC METABOLIC PNL TOTAL CA: CPT

## 2025-05-02 PROCEDURE — 2580000003 HC RX 258: Performed by: NURSE ANESTHETIST, CERTIFIED REGISTERED

## 2025-05-02 PROCEDURE — 2060000000 HC ICU INTERMEDIATE R&B

## 2025-05-02 PROCEDURE — 6360000002 HC RX W HCPCS: Performed by: NURSE ANESTHETIST, CERTIFIED REGISTERED

## 2025-05-02 PROCEDURE — 3600000002 HC SURGERY LEVEL 2 BASE: Performed by: PODIATRIST

## 2025-05-02 PROCEDURE — 2500000003 HC RX 250 WO HCPCS: Performed by: SURGERY

## 2025-05-02 PROCEDURE — 3700000000 HC ANESTHESIA ATTENDED CARE: Performed by: PODIATRIST

## 2025-05-02 PROCEDURE — 6360000002 HC RX W HCPCS: Performed by: PODIATRIST

## 2025-05-02 PROCEDURE — 3700000001 HC ADD 15 MINUTES (ANESTHESIA): Performed by: PODIATRIST

## 2025-05-02 PROCEDURE — 97162 PT EVAL MOD COMPLEX 30 MIN: CPT

## 2025-05-02 PROCEDURE — 3600000012 HC SURGERY LEVEL 2 ADDTL 15MIN: Performed by: PODIATRIST

## 2025-05-02 PROCEDURE — 82948 REAGENT STRIP/BLOOD GLUCOSE: CPT

## 2025-05-02 PROCEDURE — 2580000003 HC RX 258: Performed by: SURGERY

## 2025-05-02 PROCEDURE — 6360000002 HC RX W HCPCS: Performed by: INTERNAL MEDICINE

## 2025-05-02 PROCEDURE — 6370000000 HC RX 637 (ALT 250 FOR IP): Performed by: SURGERY

## 2025-05-02 PROCEDURE — 36415 COLL VENOUS BLD VENIPUNCTURE: CPT

## 2025-05-02 PROCEDURE — 97166 OT EVAL MOD COMPLEX 45 MIN: CPT

## 2025-05-02 PROCEDURE — 0Y6R0Z0 DETACHMENT AT RIGHT 2ND TOE, COMPLETE, OPEN APPROACH: ICD-10-PCS | Performed by: PODIATRIST

## 2025-05-02 PROCEDURE — 2720000010 HC SURG SUPPLY STERILE: Performed by: PODIATRIST

## 2025-05-02 PROCEDURE — 88305 TISSUE EXAM BY PATHOLOGIST: CPT

## 2025-05-02 PROCEDURE — 2709999900 HC NON-CHARGEABLE SUPPLY: Performed by: PODIATRIST

## 2025-05-02 PROCEDURE — 2580000003 HC RX 258: Performed by: INTERNAL MEDICINE

## 2025-05-02 RX ORDER — MIDAZOLAM HYDROCHLORIDE 1 MG/ML
INJECTION, SOLUTION INTRAMUSCULAR; INTRAVENOUS
Status: DISCONTINUED | OUTPATIENT
Start: 2025-05-02 | End: 2025-05-02 | Stop reason: SDUPTHER

## 2025-05-02 RX ORDER — SODIUM CHLORIDE 9 MG/ML
INJECTION, SOLUTION INTRAVENOUS
Status: DISCONTINUED | OUTPATIENT
Start: 2025-05-02 | End: 2025-05-02 | Stop reason: SDUPTHER

## 2025-05-02 RX ORDER — EPHEDRINE SULFATE/0.9% NACL/PF 25 MG/5 ML
SYRINGE (ML) INTRAVENOUS
Status: DISCONTINUED | OUTPATIENT
Start: 2025-05-02 | End: 2025-05-02 | Stop reason: SDUPTHER

## 2025-05-02 RX ORDER — OXYCODONE AND ACETAMINOPHEN 5; 325 MG/1; MG/1
1 TABLET ORAL EVERY 6 HOURS PRN
Qty: 28 TABLET | Refills: 0 | Status: SHIPPED | OUTPATIENT
Start: 2025-05-02 | End: 2025-05-07 | Stop reason: SDUPTHER

## 2025-05-02 RX ORDER — POLYVINYL ALCOHOL 14 MG/ML
1 SOLUTION/ DROPS OPHTHALMIC PRN
Status: DISCONTINUED | OUTPATIENT
Start: 2025-05-02 | End: 2025-05-07 | Stop reason: HOSPADM

## 2025-05-02 RX ORDER — SODIUM CHLORIDE 9 MG/ML
INJECTION, SOLUTION INTRAVENOUS CONTINUOUS
Status: DISCONTINUED | OUTPATIENT
Start: 2025-05-02 | End: 2025-05-03

## 2025-05-02 RX ORDER — SODIUM CHLORIDE 0.9 % (FLUSH) 0.9 %
5-40 SYRINGE (ML) INJECTION PRN
Status: DISCONTINUED | OUTPATIENT
Start: 2025-05-02 | End: 2025-05-07 | Stop reason: HOSPADM

## 2025-05-02 RX ORDER — FENTANYL CITRATE 50 UG/ML
INJECTION, SOLUTION INTRAMUSCULAR; INTRAVENOUS
Status: DISCONTINUED | OUTPATIENT
Start: 2025-05-02 | End: 2025-05-02 | Stop reason: SDUPTHER

## 2025-05-02 RX ORDER — PROPOFOL 10 MG/ML
INJECTION, EMULSION INTRAVENOUS
Status: DISCONTINUED | OUTPATIENT
Start: 2025-05-02 | End: 2025-05-02 | Stop reason: SDUPTHER

## 2025-05-02 RX ORDER — BUPIVACAINE HYDROCHLORIDE 5 MG/ML
INJECTION, SOLUTION EPIDURAL; INTRACAUDAL; PERINEURAL PRN
Status: DISCONTINUED | OUTPATIENT
Start: 2025-05-02 | End: 2025-05-02 | Stop reason: ALTCHOICE

## 2025-05-02 RX ORDER — SODIUM CHLORIDE, SODIUM LACTATE, POTASSIUM CHLORIDE, CALCIUM CHLORIDE 600; 310; 30; 20 MG/100ML; MG/100ML; MG/100ML; MG/100ML
INJECTION, SOLUTION INTRAVENOUS CONTINUOUS
Status: DISCONTINUED | OUTPATIENT
Start: 2025-05-02 | End: 2025-05-03

## 2025-05-02 RX ORDER — SODIUM CHLORIDE 0.9 % (FLUSH) 0.9 %
5-40 SYRINGE (ML) INJECTION EVERY 12 HOURS SCHEDULED
Status: DISCONTINUED | OUTPATIENT
Start: 2025-05-02 | End: 2025-05-07 | Stop reason: HOSPADM

## 2025-05-02 RX ORDER — SODIUM CHLORIDE 9 MG/ML
INJECTION, SOLUTION INTRAVENOUS PRN
Status: DISCONTINUED | OUTPATIENT
Start: 2025-05-02 | End: 2025-05-07 | Stop reason: HOSPADM

## 2025-05-02 RX ADMIN — PALIPERIDONE 12 MG: 3 TABLET, EXTENDED RELEASE ORAL at 07:49

## 2025-05-02 RX ADMIN — POLYETHYLENE GLYCOL 3350 17 G: 17 POWDER, FOR SOLUTION ORAL at 17:50

## 2025-05-02 RX ADMIN — OXYBUTYNIN CHLORIDE 10 MG: 10 TABLET, EXTENDED RELEASE ORAL at 07:49

## 2025-05-02 RX ADMIN — PROPOFOL 30 MG: 10 INJECTION, EMULSION INTRAVENOUS at 16:11

## 2025-05-02 RX ADMIN — BUPROPION HYDROCHLORIDE 300 MG: 300 TABLET, EXTENDED RELEASE ORAL at 07:48

## 2025-05-02 RX ADMIN — EPHEDRINE SULFATE 15 MG: 5 INJECTION INTRAVENOUS at 16:29

## 2025-05-02 RX ADMIN — SODIUM CHLORIDE, PRESERVATIVE FREE 10 ML: 5 INJECTION INTRAVENOUS at 07:49

## 2025-05-02 RX ADMIN — PROPOFOL 20 MG: 10 INJECTION, EMULSION INTRAVENOUS at 16:24

## 2025-05-02 RX ADMIN — PROPOFOL 20 MG: 10 INJECTION, EMULSION INTRAVENOUS at 16:34

## 2025-05-02 RX ADMIN — ATORVASTATIN CALCIUM 80 MG: 80 TABLET, FILM COATED ORAL at 07:48

## 2025-05-02 RX ADMIN — EPHEDRINE SULFATE 10 MG: 5 INJECTION INTRAVENOUS at 16:23

## 2025-05-02 RX ADMIN — PROPOFOL 30 MG: 10 INJECTION, EMULSION INTRAVENOUS at 16:29

## 2025-05-02 RX ADMIN — PIPERACILLIN AND TAZOBACTAM 3375 MG: 3; .375 INJECTION, POWDER, FOR SOLUTION INTRAVENOUS at 01:08

## 2025-05-02 RX ADMIN — DOCUSATE SODIUM 100 MG: 100 CAPSULE, LIQUID FILLED ORAL at 07:46

## 2025-05-02 RX ADMIN — SODIUM CHLORIDE: 0.9 INJECTION, SOLUTION INTRAVENOUS at 17:49

## 2025-05-02 RX ADMIN — SODIUM CHLORIDE: 0.9 INJECTION, SOLUTION INTRAVENOUS at 20:20

## 2025-05-02 RX ADMIN — PROPOFOL 30 MG: 10 INJECTION, EMULSION INTRAVENOUS at 16:19

## 2025-05-02 RX ADMIN — PROPOFOL 20 MG: 10 INJECTION, EMULSION INTRAVENOUS at 16:41

## 2025-05-02 RX ADMIN — FINASTERIDE 5 MG: 5 TABLET, FILM COATED ORAL at 07:48

## 2025-05-02 RX ADMIN — Medication 1 TABLET: at 07:49

## 2025-05-02 RX ADMIN — HYDROCODONE BITARTRATE AND ACETAMINOPHEN 1 TABLET: 5; 325 TABLET ORAL at 14:21

## 2025-05-02 RX ADMIN — DOCUSATE SODIUM 100 MG: 100 CAPSULE, LIQUID FILLED ORAL at 20:33

## 2025-05-02 RX ADMIN — SODIUM CHLORIDE, SODIUM LACTATE, POTASSIUM CHLORIDE, AND CALCIUM CHLORIDE: .6; .31; .03; .02 INJECTION, SOLUTION INTRAVENOUS at 07:43

## 2025-05-02 RX ADMIN — PROPOFOL 30 MG: 10 INJECTION, EMULSION INTRAVENOUS at 16:38

## 2025-05-02 RX ADMIN — GABAPENTIN 100 MG: 100 CAPSULE ORAL at 07:48

## 2025-05-02 RX ADMIN — HYDROCODONE BITARTRATE AND ACETAMINOPHEN 1 TABLET: 5; 325 TABLET ORAL at 01:45

## 2025-05-02 RX ADMIN — HYDROCODONE BITARTRATE AND ACETAMINOPHEN 1 TABLET: 5; 325 TABLET ORAL at 07:46

## 2025-05-02 RX ADMIN — GABAPENTIN 100 MG: 100 CAPSULE ORAL at 14:21

## 2025-05-02 RX ADMIN — GABAPENTIN 100 MG: 100 CAPSULE ORAL at 20:33

## 2025-05-02 RX ADMIN — SODIUM CHLORIDE: 9 INJECTION, SOLUTION INTRAVENOUS at 16:02

## 2025-05-02 RX ADMIN — MIDAZOLAM 1 MG: 1 INJECTION INTRAMUSCULAR; INTRAVENOUS at 16:08

## 2025-05-02 RX ADMIN — ASPIRIN 81 MG: 81 TABLET, CHEWABLE ORAL at 10:00

## 2025-05-02 RX ADMIN — PIPERACILLIN AND TAZOBACTAM 3375 MG: 3; .375 INJECTION, POWDER, FOR SOLUTION INTRAVENOUS at 07:41

## 2025-05-02 RX ADMIN — PANTOPRAZOLE SODIUM 40 MG: 40 TABLET, DELAYED RELEASE ORAL at 07:46

## 2025-05-02 RX ADMIN — PIPERACILLIN AND TAZOBACTAM 3375 MG: 3; .375 INJECTION, POWDER, FOR SOLUTION INTRAVENOUS at 14:24

## 2025-05-02 RX ADMIN — Medication 30 MG: at 16:11

## 2025-05-02 RX ADMIN — PROPOFOL 20 MG: 10 INJECTION, EMULSION INTRAVENOUS at 16:15

## 2025-05-02 RX ADMIN — PIPERACILLIN AND TAZOBACTAM 3375 MG: 3; .375 INJECTION, POWDER, FOR SOLUTION INTRAVENOUS at 20:33

## 2025-05-02 RX ADMIN — FENTANYL CITRATE 50 MCG: 50 INJECTION, SOLUTION INTRAMUSCULAR; INTRAVENOUS at 16:08

## 2025-05-02 ASSESSMENT — PAIN DESCRIPTION - ORIENTATION
ORIENTATION: RIGHT

## 2025-05-02 ASSESSMENT — PAIN DESCRIPTION - DESCRIPTORS
DESCRIPTORS: ACHING;THROBBING
DESCRIPTORS: ACHING;SORE
DESCRIPTORS: SORE

## 2025-05-02 ASSESSMENT — PAIN DESCRIPTION - LOCATION
LOCATION: LEG

## 2025-05-02 ASSESSMENT — PAIN SCALES - GENERAL
PAINLEVEL_OUTOF10: 6
PAINLEVEL_OUTOF10: 6
PAINLEVEL_OUTOF10: 4
PAINLEVEL_OUTOF10: 5

## 2025-05-02 ASSESSMENT — PAIN - FUNCTIONAL ASSESSMENT
PAIN_FUNCTIONAL_ASSESSMENT: ACTIVITIES ARE NOT PREVENTED

## 2025-05-02 ASSESSMENT — PAIN DESCRIPTION - PAIN TYPE: TYPE: ACUTE PAIN

## 2025-05-02 ASSESSMENT — PAIN DESCRIPTION - ONSET: ONSET: ON-GOING

## 2025-05-02 ASSESSMENT — PAIN DESCRIPTION - FREQUENCY: FREQUENCY: INTERMITTENT

## 2025-05-02 NOTE — BRIEF OP NOTE
Brief Postoperative Note      Patient: Rajat Haywood  YOB: 1950  MRN: [id number]    Date of Procedure: 5/2/2025    Pre-Op Diagnosis Codes:      * Toe infection [L08.9]    Post-Op Diagnosis: Same       Procedure(s):  RIGHT 2ND TOE AMPUTATION    Surgeon(s):  Gumaro Elizabeth DPM    Assistant:  Resident: Perry Roe DPM; Sakshi Silverman DPM    Anesthesia: MAC    Estimated Blood Loss (mL): Minimal    Complications: None    Hemostasis: surgicel powder     Injectables: 20cc 0.5% marcaine plain    Materials: 3-0 vicryl     Specimens:   ID Type Source Tests Collected by Time Destination   A : right second toe Tissue Toe SURGICAL PATHOLOGY Gumaro Elizabeth DPM 5/2/2025 1641        Implants:  * No implants in log *      Drains:   [REMOVED] Urinary Catheter 04/30/25 Griffith (Removed)   Catheter Indications Perioperative use for selected surgical procedures 05/01/25 1127   Site Assessment Pale 05/01/25 1127   Urine Color Micki 05/01/25 1127   Urine Appearance Clear 05/01/25 1127   Collection Container Standard 05/01/25 1127   Securement Method Securing device (Describe) 05/01/25 1127   Catheter Care  Perineal wipes 05/01/25 0921   Catheter Best Practices  Drainage tube clipped to bed;Tamper seal intact;Bag below bladder;Bag not on floor;Lack of dependent loop in tubing;Drainage bag less than half full 05/01/25 1127   Status Draining;Patent 05/01/25 1127   Output (mL) 400 mL 05/01/25 1551       Findings:  Infection Present At Time Of Surgery (PATOS) (choose all levels that have infection present):  - Organ Space infection (below fascia) present as evidenced by osteomyelitis  Other Findings: consistent with diagnosis     Electronically signed by Sakshi Silverman DPM on 5/2/2025 at 5:00 PM

## 2025-05-02 NOTE — ANESTHESIA PRE PROCEDURE
Department of Anesthesiology  Preprocedure Note       Name:  Rajat Haywood   Age:  75 y.o.  :  1950                                          MRN:  [id number]         Date:  2025      Surgeon: Surgeon(s):  Gumaro Elizabeth DPM    Procedure: Procedure(s):  RIGHT 2ND TOE AMPUTATION    Medications prior to admission:   Prior to Admission medications    Medication Sig Start Date End Date Taking? Authorizing Provider   gabapentin (NEURONTIN) 100 MG capsule Take 1 capsule by mouth 3 times daily.   Yes Provider, MD Vinay   acetaminophen (TYLENOL) 325 MG tablet Take 2 tablets by mouth in the morning and at bedtime   Yes Provider, MD Vinay   docusate sodium (COLACE) 100 MG capsule Take 1 capsule by mouth daily   Yes Provider, MD Vinay   doxazosin (CARDURA) 4 MG tablet Take 1 tablet by mouth at bedtime 3/25/25  Yes Jennifer Thomas PA-C   paliperidone (INVEGA) 6 MG extended release tablet Take 2 tablets by mouth daily 3/26/25  Yes Jennifer Thomas PA-C   lisinopril (PRINIVIL;ZESTRIL) 20 MG tablet Take 1 tablet by mouth daily   Yes Provider, MD iVnay   oxyBUTYnin (DITROPAN-XL) 10 MG extended release tablet Take 1 tablet by mouth daily   Yes Provider, MD Vinay   Cholecalciferol 50 MCG (2000) TABS Take 1 tablet by mouth daily   Yes Provider, MD Vinay   buPROPion (WELLBUTRIN XL) 300 MG extended release tablet Take 1 tablet by mouth every morning   Yes ProviderVinay MD   metFORMIN (GLUCOPHAGE) 500 MG tablet Take 1 tablet by mouth daily (with breakfast)   Yes Provider, MD Vinay   finasteride (PROSCAR) 5 MG tablet Take 1 tablet by mouth daily   Yes Provider, Historical, MD   omeprazole (PRILOSEC) 20 MG delayed release capsule Take 1 capsule by mouth daily   Yes Provider, MD Vinay   Multiple Vitamins-Minerals (THERAPEUTIC MULTIVITAMIN-MINERALS) tablet Take 1 tablet by mouth daily   Yes Provider, MD Vinay   atorvastatin (LIPITOR) 80 MG tablet Take 1

## 2025-05-02 NOTE — PLAN OF CARE
Problem: Chronic Conditions and Co-morbidities  Goal: Patient's chronic conditions and co-morbidity symptoms are monitored and maintained or improved  Outcome: Progressing     Problem: Discharge Planning  Goal: Discharge to home or other facility with appropriate resources  Outcome: Progressing     Problem: Pain  Goal: Verbalizes/displays adequate comfort level or baseline comfort level  Outcome: Progressing     Problem: Safety - Adult  Goal: Free from fall injury  Outcome: Progressing     Problem: Skin/Tissue Integrity  Goal: Skin integrity remains intact  Description: 1.  Monitor for areas of redness and/or skin breakdown2.  Assess vascular access sites hourly3.  Every 4-6 hours minimum:  Change oxygen saturation probe site4.  Every 4-6 hours:  If on nasal continuous positive airway pressure, respiratory therapy assess nares and determine need for appliance change or resting period  Outcome: Progressing     Problem: Neurosensory - Adult  Goal: Achieves stable or improved neurological status  Outcome: Progressing     Problem: Skin/Tissue Integrity - Adult  Goal: Incisions, wounds, or drain sites healing without S/S of infection  Outcome: Progressing     Problem: Genitourinary - Adult  Goal: Absence of urinary retention  Outcome: Progressing   Care plan reviewed with patient.  Patient verbalizes understanding of the care plan and contributed to goal setting.

## 2025-05-02 NOTE — PLAN OF CARE
Problem: Chronic Conditions and Co-morbidities  Goal: Patient's chronic conditions and co-morbidity symptoms are monitored and maintained or improved  Outcome: Progressing  Flowsheets (Taken 5/1/2025 0011 by Estela Oquendo, RN)  Care Plan - Patient's Chronic Conditions and Co-Morbidity Symptoms are Monitored and Maintained or Improved:   Monitor and assess patient's chronic conditions and comorbid symptoms for stability, deterioration, or improvement   Collaborate with multidisciplinary team to address chronic and comorbid conditions and prevent exacerbation or deterioration   Update acute care plan with appropriate goals if chronic or comorbid symptoms are exacerbated and prevent overall improvement and discharge     Problem: Discharge Planning  Goal: Discharge to home or other facility with appropriate resources  Outcome: Progressing  Flowsheets (Taken 5/1/2025 0011 by Estela Oquendo, RN)  Discharge to home or other facility with appropriate resources:   Identify barriers to discharge with patient and caregiver   Arrange for needed discharge resources and transportation as appropriate   Identify discharge learning needs (meds, wound care, etc)   Refer to discharge planning if patient needs post-hospital services based on physician order or complex needs related to functional status, cognitive ability or social support system     Problem: Pain  Goal: Verbalizes/displays adequate comfort level or baseline comfort level  Outcome: Progressing  Flowsheets (Taken 5/1/2025 0011 by Estela Oquendo, RN)  Verbalizes/displays adequate comfort level or baseline comfort level:   Encourage patient to monitor pain and request assistance   Assess pain using appropriate pain scale   Administer analgesics based on type and severity of pain and evaluate response   Implement non-pharmacological measures as appropriate and evaluate response   Notify Licensed Independent Practitioner if interventions unsuccessful or patient reports new

## 2025-05-02 NOTE — H&P
[facility]  History and Physical Update    Pt Name: Rajat Haywood  MRN: [id number]  YOB: 1950  Date of evaluation: 5/2/2025    I have examined the patient and reviewed the H&P/Consult and there are no changes to the patient or plans.      Gumaro Elizabeth DPM  Electronically signed 5/2/2025 at 4:00 PM

## 2025-05-03 LAB
ANION GAP SERPL CALC-SCNC: 6 MEQ/L (ref 8–16)
BUN SERPL-MCNC: 10 MG/DL (ref 8–23)
CALCIUM SERPL-MCNC: 8.1 MG/DL (ref 8.8–10.2)
CHLORIDE SERPL-SCNC: 109 MEQ/L (ref 98–111)
CO2 SERPL-SCNC: 26 MEQ/L (ref 22–29)
CREAT SERPL-MCNC: 0.6 MG/DL (ref 0.7–1.2)
DEPRECATED RDW RBC AUTO: 56.7 FL (ref 35–45)
ERYTHROCYTE [DISTWIDTH] IN BLOOD BY AUTOMATED COUNT: 18 % (ref 11.5–14.5)
GFR SERPL CREATININE-BSD FRML MDRD: > 90 ML/MIN/1.73M2
GLUCOSE BLD STRIP.AUTO-MCNC: 118 MG/DL (ref 70–108)
GLUCOSE BLD STRIP.AUTO-MCNC: 124 MG/DL (ref 70–108)
GLUCOSE BLD STRIP.AUTO-MCNC: 130 MG/DL (ref 70–108)
GLUCOSE BLD STRIP.AUTO-MCNC: 132 MG/DL (ref 70–108)
GLUCOSE SERPL-MCNC: 128 MG/DL (ref 74–109)
HCT VFR BLD AUTO: 40.4 % (ref 42–52)
HGB BLD-MCNC: 12.7 GM/DL (ref 14–18)
MAGNESIUM SERPL-MCNC: 1.8 MG/DL (ref 1.6–2.6)
MCH RBC QN AUTO: 27.4 PG (ref 26–33)
MCHC RBC AUTO-ENTMCNC: 31.4 GM/DL (ref 32.2–35.5)
MCV RBC AUTO: 87.1 FL (ref 80–94)
PLATELET # BLD AUTO: 296 THOU/MM3 (ref 130–400)
PMV BLD AUTO: 9.6 FL (ref 9.4–12.4)
POTASSIUM SERPL-SCNC: 3.9 MEQ/L (ref 3.5–5.2)
RBC # BLD AUTO: 4.64 MILL/MM3 (ref 4.7–6.1)
SODIUM SERPL-SCNC: 141 MEQ/L (ref 135–145)
WBC # BLD AUTO: 7.5 THOU/MM3 (ref 4.8–10.8)

## 2025-05-03 PROCEDURE — 2500000003 HC RX 250 WO HCPCS

## 2025-05-03 PROCEDURE — 2580000003 HC RX 258: Performed by: INTERNAL MEDICINE

## 2025-05-03 PROCEDURE — 2060000000 HC ICU INTERMEDIATE R&B

## 2025-05-03 PROCEDURE — 80048 BASIC METABOLIC PNL TOTAL CA: CPT

## 2025-05-03 PROCEDURE — 36415 COLL VENOUS BLD VENIPUNCTURE: CPT

## 2025-05-03 PROCEDURE — 6360000002 HC RX W HCPCS: Performed by: SURGERY

## 2025-05-03 PROCEDURE — 2500000003 HC RX 250 WO HCPCS: Performed by: SURGERY

## 2025-05-03 PROCEDURE — 6360000002 HC RX W HCPCS: Performed by: INTERNAL MEDICINE

## 2025-05-03 PROCEDURE — 82948 REAGENT STRIP/BLOOD GLUCOSE: CPT

## 2025-05-03 PROCEDURE — 99232 SBSQ HOSP IP/OBS MODERATE 35: CPT | Performed by: SURGERY

## 2025-05-03 PROCEDURE — 2580000003 HC RX 258

## 2025-05-03 PROCEDURE — 85027 COMPLETE CBC AUTOMATED: CPT

## 2025-05-03 PROCEDURE — 83735 ASSAY OF MAGNESIUM: CPT

## 2025-05-03 PROCEDURE — 6370000000 HC RX 637 (ALT 250 FOR IP): Performed by: SURGERY

## 2025-05-03 RX ORDER — MAGNESIUM SULFATE HEPTAHYDRATE 40 MG/ML
2000 INJECTION, SOLUTION INTRAVENOUS ONCE
Status: COMPLETED | OUTPATIENT
Start: 2025-05-03 | End: 2025-05-03

## 2025-05-03 RX ORDER — ENOXAPARIN SODIUM 100 MG/ML
40 INJECTION SUBCUTANEOUS DAILY
Status: DISCONTINUED | OUTPATIENT
Start: 2025-05-03 | End: 2025-05-07 | Stop reason: HOSPADM

## 2025-05-03 RX ADMIN — GABAPENTIN 100 MG: 100 CAPSULE ORAL at 20:06

## 2025-05-03 RX ADMIN — ATORVASTATIN CALCIUM 80 MG: 80 TABLET, FILM COATED ORAL at 09:53

## 2025-05-03 RX ADMIN — GABAPENTIN 100 MG: 100 CAPSULE ORAL at 13:56

## 2025-05-03 RX ADMIN — PANTOPRAZOLE SODIUM 40 MG: 40 TABLET, DELAYED RELEASE ORAL at 06:17

## 2025-05-03 RX ADMIN — Medication 1 TABLET: at 09:53

## 2025-05-03 RX ADMIN — GABAPENTIN 100 MG: 100 CAPSULE ORAL at 09:53

## 2025-05-03 RX ADMIN — FINASTERIDE 5 MG: 5 TABLET, FILM COATED ORAL at 09:53

## 2025-05-03 RX ADMIN — MAGNESIUM SULFATE HEPTAHYDRATE 2000 MG: 40 INJECTION, SOLUTION INTRAVENOUS at 09:51

## 2025-05-03 RX ADMIN — PIPERACILLIN AND TAZOBACTAM 3375 MG: 3; .375 INJECTION, POWDER, FOR SOLUTION INTRAVENOUS at 13:56

## 2025-05-03 RX ADMIN — PIPERACILLIN AND TAZOBACTAM 3375 MG: 3; .375 INJECTION, POWDER, FOR SOLUTION INTRAVENOUS at 19:52

## 2025-05-03 RX ADMIN — ENOXAPARIN SODIUM 40 MG: 100 INJECTION SUBCUTANEOUS at 10:02

## 2025-05-03 RX ADMIN — DOCUSATE SODIUM 100 MG: 100 CAPSULE, LIQUID FILLED ORAL at 19:47

## 2025-05-03 RX ADMIN — HYDROCODONE BITARTRATE AND ACETAMINOPHEN 1 TABLET: 5; 325 TABLET ORAL at 01:03

## 2025-05-03 RX ADMIN — SODIUM CHLORIDE, PRESERVATIVE FREE 10 ML: 5 INJECTION INTRAVENOUS at 19:48

## 2025-05-03 RX ADMIN — HYDROCODONE BITARTRATE AND ACETAMINOPHEN 1 TABLET: 5; 325 TABLET ORAL at 19:59

## 2025-05-03 RX ADMIN — PALIPERIDONE 12 MG: 3 TABLET, EXTENDED RELEASE ORAL at 09:52

## 2025-05-03 RX ADMIN — HYDROCODONE BITARTRATE AND ACETAMINOPHEN 1 TABLET: 5; 325 TABLET ORAL at 05:56

## 2025-05-03 RX ADMIN — OXYBUTYNIN CHLORIDE 10 MG: 10 TABLET, EXTENDED RELEASE ORAL at 09:53

## 2025-05-03 RX ADMIN — SODIUM CHLORIDE: 0.9 INJECTION, SOLUTION INTRAVENOUS at 04:26

## 2025-05-03 RX ADMIN — PIPERACILLIN AND TAZOBACTAM 3375 MG: 3; .375 INJECTION, POWDER, FOR SOLUTION INTRAVENOUS at 03:48

## 2025-05-03 RX ADMIN — DOCUSATE SODIUM 100 MG: 100 CAPSULE, LIQUID FILLED ORAL at 09:52

## 2025-05-03 RX ADMIN — ASPIRIN 81 MG: 81 TABLET, CHEWABLE ORAL at 09:52

## 2025-05-03 RX ADMIN — POLYVINYL ALCOHOL 1 DROP: 1.4 SOLUTION/ DROPS OPHTHALMIC at 17:15

## 2025-05-03 RX ADMIN — BUPROPION HYDROCHLORIDE 300 MG: 300 TABLET, EXTENDED RELEASE ORAL at 09:53

## 2025-05-03 RX ADMIN — ACETAMINOPHEN 650 MG: 325 TABLET ORAL at 06:17

## 2025-05-03 ASSESSMENT — PAIN DESCRIPTION - DESCRIPTORS
DESCRIPTORS: ACHING

## 2025-05-03 ASSESSMENT — PAIN DESCRIPTION - LOCATION
LOCATION: FOOT
LOCATION: LEG;FOOT
LOCATION: TOE (COMMENT WHICH ONE)
LOCATION: LEG

## 2025-05-03 ASSESSMENT — PAIN DESCRIPTION - ORIENTATION
ORIENTATION: RIGHT

## 2025-05-03 ASSESSMENT — PAIN SCALES - GENERAL
PAINLEVEL_OUTOF10: 4
PAINLEVEL_OUTOF10: 5
PAINLEVEL_OUTOF10: 6
PAINLEVEL_OUTOF10: 8

## 2025-05-03 ASSESSMENT — PAIN - FUNCTIONAL ASSESSMENT: PAIN_FUNCTIONAL_ASSESSMENT: ACTIVITIES ARE NOT PREVENTED

## 2025-05-03 NOTE — PLAN OF CARE
Problem: Safety - Adult  Goal: Free from fall injury  5/2/2025 2152 by Joyce Rose RN  Outcome: Progressing  5/2/2025 1826 by Jenni Vickers RN  Outcome: Progressing     Problem: Skin/Tissue Integrity  Goal: Skin integrity remains intact  Description: 1.  Monitor for areas of redness and/or skin breakdown2.  Assess vascular access sites hourly3.  Every 4-6 hours minimum:  Change oxygen saturation probe site4.  Every 4-6 hours:  If on nasal continuous positive airway pressure, respiratory therapy assess nares and determine need for appliance change or resting period  5/2/2025 2152 by Joyce Rose, RN  Outcome: Progressing  Flowsheets (Taken 5/2/2025 1925)  Skin Integrity Remains Intact: Monitor for areas of redness and/or skin breakdown  5/2/2025 1826 by Jenni Vickers, RN  Outcome: Progressing

## 2025-05-03 NOTE — OP NOTE
[address] [address]                            OPERATIVE REPORT      PATIENT NAME: KALIE STUBBS               : 1950  MED REC NO: [id number]                       ROOM: [facility]  ACCOUNT NO: [id number]                       ADMIT DATE: 2025  PROVIDER: Gumaro Elizabeth DPM      DATE OF PROCEDURE:  2025    SURGEON:  Gumaro Elizabeth DPM    ASSISTANTS:    1. Dr. Vinayak Fuller.  2. Dr. Perry Huerta.    PREOPERATIVE DIAGNOSIS:  Gangrene, right second toe, code I96.0.    POSTOPERATIVE DIAGNOSIS:  Gangrene, right second toe, code I96.0.    PROCEDURE:  Amputation of right second toe at metatarsophalangeal joint level, code 55283.    ANESTHESIA:  See Anesthesia record.    HEMOSTASIS:  None.    ESTIMATED BLOOD LOSS:  10 mL.    MATERIALS:  None.    INJECTABLES:  20 mL 0.5% Marcaine plain.    COMPLICATIONS:  None.    SPECIMENS:  Right second toe sent to Pathology.    DESCRIPTION OF PROCEDURE:  After properly identified in the holding area, the patient was transferred to the operating room and placed on the table in normal supine position.  Once anesthesia was achieved, 20 mL 0.5% Marcaine plain infiltrated in the right second ray.  The patient had the right foot prepped and draped in usual sterile fashion.  Time-out was taken confirming the correct patient and operating on the correct right lower extremity.    Attention was then directed to the right second toe.  A vertically based semi-elliptical incision was made dorsally at the level of second metatarsophalangeal joint, carried medial and laterally over the skin of the second toe where it was within healthy skin into the digital sulcus area of the second toe of the right foot.  A #15 blade used and buried down to bone.  Subperiosteal dissection was performed.  The MTP joint was identified.  All soft tissue attachments to the second metatarsophalangeal joint were then transected.  The 
posterior wall of the CFA with some 6-0 prolene sutures. I performed patch angioplasty of the right CFA with a bovine pericardial patch and running 6-0 prolene suture. On completion, I released the clamps and had no bleeding. We re-clamped and made an arteriotomy on the bovine patch. I performed a right common femoral to reversed greater saphenous vein proximal anastomosis over a 1.5 cm arteriotomy with a running 6-0 prolene suture in a parachute fashion. On completion, we dilated the GSV graft up. We saw no bleeding from the GSV. We marked the orientation of the vein graft to prevent kinking or twisting.     I tunneled the GSV through my [place] tunneler. We made sure there was no twists or kinks in the vein as it was tunneled. The vein had excellent flow when we released the clamps.     I turned my attention to the distal anastomosis. I made a ~1.5 cm arteriotomy on the right anterior tibial artery and performed a distal anastomosis with a running 7-0 prolene. I flushed and back bleed the AT and vein graft prior to completion. On completion, I had palpable flow in the GSV and AT. I found a good DP doppler signal on the right foot. I was happy with the results.     30 mg IV protamine was given. We irrigated all of the incisions with irrisept and normal saline. We used surgicel and fibrillar for hemostasis in the groin and right anterior lower leg muscle compartment. The surgicel was removed prior to closure. We closed the vein harvest incisions along the medial leg with interrupted 3-0 vicryls and staples for the skin. The lower medial ankle and right anterior compartment incisions were closed with interrupted 3-0 vicryls and 3-0 nylon mattress sutures. I made sure the vein graft was not kinked by the muscle and had adequate space between the anterior compartment muscles. I bent and flexed the leg to ensure the graft was not impinged upon. I closed the right groin in four layers with interrupted 2-0 and 3-0 vicryls,

## 2025-05-04 PROBLEM — I96 GANGRENE OF TOE OF RIGHT FOOT (HCC): Status: RESOLVED | Noted: 2025-04-25 | Resolved: 2025-05-04

## 2025-05-04 PROBLEM — I70.261: Status: RESOLVED | Noted: 2025-04-25 | Resolved: 2025-05-04

## 2025-05-04 PROBLEM — I70.221 CRITICAL LIMB ISCHEMIA OF RIGHT LOWER EXTREMITY (HCC): Status: RESOLVED | Noted: 2025-04-25 | Resolved: 2025-05-04

## 2025-05-04 LAB
ANION GAP SERPL CALC-SCNC: 8 MEQ/L (ref 8–16)
BACTERIA SPEC AEROBE CULT: NORMAL
BACTERIA SPEC ANAEROBE CULT: NORMAL
BUN SERPL-MCNC: 7 MG/DL (ref 8–23)
CALCIUM SERPL-MCNC: 7.8 MG/DL (ref 8.8–10.2)
CHLORIDE SERPL-SCNC: 105 MEQ/L (ref 98–111)
CO2 SERPL-SCNC: 25 MEQ/L (ref 22–29)
CREAT SERPL-MCNC: 0.6 MG/DL (ref 0.7–1.2)
GFR SERPL CREATININE-BSD FRML MDRD: > 90 ML/MIN/1.73M2
GLUCOSE BLD STRIP.AUTO-MCNC: 120 MG/DL (ref 70–108)
GLUCOSE BLD STRIP.AUTO-MCNC: 132 MG/DL (ref 70–108)
GLUCOSE BLD STRIP.AUTO-MCNC: 136 MG/DL (ref 70–108)
GLUCOSE BLD STRIP.AUTO-MCNC: 136 MG/DL (ref 70–108)
GLUCOSE SERPL-MCNC: 154 MG/DL (ref 74–109)
GRAM STN SPEC: NORMAL
MAGNESIUM SERPL-MCNC: 1.9 MG/DL (ref 1.6–2.6)
POTASSIUM SERPL-SCNC: 3.8 MEQ/L (ref 3.5–5.2)
SODIUM SERPL-SCNC: 138 MEQ/L (ref 135–145)

## 2025-05-04 PROCEDURE — 82948 REAGENT STRIP/BLOOD GLUCOSE: CPT

## 2025-05-04 PROCEDURE — 2500000003 HC RX 250 WO HCPCS: Performed by: SURGERY

## 2025-05-04 PROCEDURE — 83735 ASSAY OF MAGNESIUM: CPT

## 2025-05-04 PROCEDURE — 97110 THERAPEUTIC EXERCISES: CPT

## 2025-05-04 PROCEDURE — 36415 COLL VENOUS BLD VENIPUNCTURE: CPT

## 2025-05-04 PROCEDURE — 6370000000 HC RX 637 (ALT 250 FOR IP): Performed by: SURGERY

## 2025-05-04 PROCEDURE — 97164 PT RE-EVAL EST PLAN CARE: CPT

## 2025-05-04 PROCEDURE — 2500000003 HC RX 250 WO HCPCS

## 2025-05-04 PROCEDURE — 6360000002 HC RX W HCPCS: Performed by: SURGERY

## 2025-05-04 PROCEDURE — 6360000002 HC RX W HCPCS: Performed by: INTERNAL MEDICINE

## 2025-05-04 PROCEDURE — 2580000003 HC RX 258: Performed by: INTERNAL MEDICINE

## 2025-05-04 PROCEDURE — 99232 SBSQ HOSP IP/OBS MODERATE 35: CPT | Performed by: SURGERY

## 2025-05-04 PROCEDURE — 80048 BASIC METABOLIC PNL TOTAL CA: CPT

## 2025-05-04 PROCEDURE — 2060000000 HC ICU INTERMEDIATE R&B

## 2025-05-04 RX ORDER — POTASSIUM CHLORIDE 1500 MG/1
20 TABLET, EXTENDED RELEASE ORAL ONCE
Status: COMPLETED | OUTPATIENT
Start: 2025-05-04 | End: 2025-05-04

## 2025-05-04 RX ORDER — DOXAZOSIN 2 MG/1
2 TABLET ORAL NIGHTLY
Status: DISCONTINUED | OUTPATIENT
Start: 2025-05-04 | End: 2025-05-07 | Stop reason: HOSPADM

## 2025-05-04 RX ORDER — PNV NO.95/FERROUS FUM/FOLIC AC 28MG-0.8MG
500 TABLET ORAL ONCE
Status: COMPLETED | OUTPATIENT
Start: 2025-05-04 | End: 2025-05-04

## 2025-05-04 RX ORDER — LISINOPRIL 10 MG/1
10 TABLET ORAL DAILY
Status: DISCONTINUED | OUTPATIENT
Start: 2025-05-04 | End: 2025-05-07 | Stop reason: HOSPADM

## 2025-05-04 RX ADMIN — LISINOPRIL 10 MG: 10 TABLET ORAL at 11:52

## 2025-05-04 RX ADMIN — PANTOPRAZOLE SODIUM 40 MG: 40 TABLET, DELAYED RELEASE ORAL at 05:04

## 2025-05-04 RX ADMIN — GABAPENTIN 100 MG: 100 CAPSULE ORAL at 08:11

## 2025-05-04 RX ADMIN — BUPROPION HYDROCHLORIDE 300 MG: 300 TABLET, EXTENDED RELEASE ORAL at 08:10

## 2025-05-04 RX ADMIN — ENOXAPARIN SODIUM 40 MG: 100 INJECTION SUBCUTANEOUS at 08:10

## 2025-05-04 RX ADMIN — DOCUSATE SODIUM 100 MG: 100 CAPSULE, LIQUID FILLED ORAL at 20:14

## 2025-05-04 RX ADMIN — SODIUM CHLORIDE, PRESERVATIVE FREE 10 ML: 5 INJECTION INTRAVENOUS at 08:18

## 2025-05-04 RX ADMIN — DOCUSATE SODIUM 100 MG: 100 CAPSULE, LIQUID FILLED ORAL at 08:10

## 2025-05-04 RX ADMIN — ASPIRIN 81 MG: 81 TABLET, CHEWABLE ORAL at 08:10

## 2025-05-04 RX ADMIN — Medication 1 TABLET: at 08:11

## 2025-05-04 RX ADMIN — PIPERACILLIN AND TAZOBACTAM 3375 MG: 3; .375 INJECTION, POWDER, FOR SOLUTION INTRAVENOUS at 05:05

## 2025-05-04 RX ADMIN — OXYBUTYNIN CHLORIDE 10 MG: 10 TABLET, EXTENDED RELEASE ORAL at 08:11

## 2025-05-04 RX ADMIN — PIPERACILLIN AND TAZOBACTAM 3375 MG: 3; .375 INJECTION, POWDER, FOR SOLUTION INTRAVENOUS at 20:17

## 2025-05-04 RX ADMIN — SODIUM CHLORIDE, PRESERVATIVE FREE 10 ML: 5 INJECTION INTRAVENOUS at 20:14

## 2025-05-04 RX ADMIN — POTASSIUM CHLORIDE 20 MEQ: 1500 TABLET, EXTENDED RELEASE ORAL at 11:52

## 2025-05-04 RX ADMIN — GABAPENTIN 100 MG: 100 CAPSULE ORAL at 20:14

## 2025-05-04 RX ADMIN — PALIPERIDONE 12 MG: 3 TABLET, EXTENDED RELEASE ORAL at 08:11

## 2025-05-04 RX ADMIN — FINASTERIDE 5 MG: 5 TABLET, FILM COATED ORAL at 08:11

## 2025-05-04 RX ADMIN — SODIUM CHLORIDE, PRESERVATIVE FREE 10 ML: 5 INJECTION INTRAVENOUS at 20:15

## 2025-05-04 RX ADMIN — HYDROCODONE BITARTRATE AND ACETAMINOPHEN 1 TABLET: 5; 325 TABLET ORAL at 18:33

## 2025-05-04 RX ADMIN — PIPERACILLIN AND TAZOBACTAM 3375 MG: 3; .375 INJECTION, POWDER, FOR SOLUTION INTRAVENOUS at 12:02

## 2025-05-04 RX ADMIN — GABAPENTIN 100 MG: 100 CAPSULE ORAL at 15:39

## 2025-05-04 RX ADMIN — ATORVASTATIN CALCIUM 80 MG: 80 TABLET, FILM COATED ORAL at 08:11

## 2025-05-04 RX ADMIN — Medication 500 MG: at 11:53

## 2025-05-04 RX ADMIN — DOXAZOSIN MESYLATE 2 MG: 2 TABLET ORAL at 20:14

## 2025-05-04 ASSESSMENT — PAIN DESCRIPTION - LOCATION: LOCATION: FOOT

## 2025-05-04 ASSESSMENT — PAIN SCALES - GENERAL: PAINLEVEL_OUTOF10: 5

## 2025-05-04 ASSESSMENT — PAIN DESCRIPTION - ORIENTATION: ORIENTATION: RIGHT

## 2025-05-04 NOTE — DISCHARGE INSTRUCTIONS
Call if T >101.5 F, fevers, chills, suspicious drainage from wound, redness around your wound or any other questions or concerns.    OK to rinse leg incisions off with soap and water. Keep the leg (bypass incisions) clean and dry. Cover with dry gauze as needed. Please keep scrotum clean. For the toe amputation incision, please keep clean and dry (do not rinse the right foot off per podiatry).    OK to take walks and climb stairs; the patient is partial weight bearing to the right leg s/p right 2nd toe amputation. Please have the patient use a heel strike shoe on the right foot when ambulating.     Nutrition consult. Recommend modular protein supplements twice daily please for protein-calorie malnutrition.

## 2025-05-05 PROBLEM — E44.0 MODERATE MALNUTRITION: Status: ACTIVE | Noted: 2025-05-05

## 2025-05-05 LAB
GLUCOSE BLD STRIP.AUTO-MCNC: 136 MG/DL (ref 70–108)
GLUCOSE BLD STRIP.AUTO-MCNC: 142 MG/DL (ref 70–108)
GLUCOSE BLD STRIP.AUTO-MCNC: 149 MG/DL (ref 70–108)
GLUCOSE BLD STRIP.AUTO-MCNC: 151 MG/DL (ref 70–108)

## 2025-05-05 PROCEDURE — 82948 REAGENT STRIP/BLOOD GLUCOSE: CPT

## 2025-05-05 PROCEDURE — 6370000000 HC RX 637 (ALT 250 FOR IP): Performed by: SURGERY

## 2025-05-05 PROCEDURE — 2580000003 HC RX 258: Performed by: INTERNAL MEDICINE

## 2025-05-05 PROCEDURE — 6360000002 HC RX W HCPCS: Performed by: INTERNAL MEDICINE

## 2025-05-05 PROCEDURE — 97535 SELF CARE MNGMENT TRAINING: CPT

## 2025-05-05 PROCEDURE — APPSS30 APP SPLIT SHARED TIME 16-30 MINUTES: Performed by: PHYSICIAN ASSISTANT

## 2025-05-05 PROCEDURE — 2060000000 HC ICU INTERMEDIATE R&B

## 2025-05-05 PROCEDURE — 6360000002 HC RX W HCPCS: Performed by: SURGERY

## 2025-05-05 PROCEDURE — 97530 THERAPEUTIC ACTIVITIES: CPT

## 2025-05-05 PROCEDURE — 2500000003 HC RX 250 WO HCPCS

## 2025-05-05 PROCEDURE — 97110 THERAPEUTIC EXERCISES: CPT

## 2025-05-05 RX ADMIN — LISINOPRIL 10 MG: 10 TABLET ORAL at 08:04

## 2025-05-05 RX ADMIN — PIPERACILLIN AND TAZOBACTAM 3375 MG: 3; .375 INJECTION, POWDER, FOR SOLUTION INTRAVENOUS at 03:41

## 2025-05-05 RX ADMIN — ENOXAPARIN SODIUM 40 MG: 100 INJECTION SUBCUTANEOUS at 08:04

## 2025-05-05 RX ADMIN — FINASTERIDE 5 MG: 5 TABLET, FILM COATED ORAL at 08:05

## 2025-05-05 RX ADMIN — ASPIRIN 81 MG: 81 TABLET, CHEWABLE ORAL at 08:04

## 2025-05-05 RX ADMIN — DOCUSATE SODIUM 100 MG: 100 CAPSULE, LIQUID FILLED ORAL at 08:04

## 2025-05-05 RX ADMIN — PANTOPRAZOLE SODIUM 40 MG: 40 TABLET, DELAYED RELEASE ORAL at 07:20

## 2025-05-05 RX ADMIN — PIPERACILLIN AND TAZOBACTAM 3375 MG: 3; .375 INJECTION, POWDER, FOR SOLUTION INTRAVENOUS at 11:35

## 2025-05-05 RX ADMIN — Medication 1 TABLET: at 08:04

## 2025-05-05 RX ADMIN — PALIPERIDONE 12 MG: 3 TABLET, EXTENDED RELEASE ORAL at 08:05

## 2025-05-05 RX ADMIN — HYDROCODONE BITARTRATE AND ACETAMINOPHEN 1 TABLET: 5; 325 TABLET ORAL at 07:20

## 2025-05-05 RX ADMIN — POLYVINYL ALCOHOL 1 DROP: 1.4 SOLUTION/ DROPS OPHTHALMIC at 11:36

## 2025-05-05 RX ADMIN — DOCUSATE SODIUM 100 MG: 100 CAPSULE, LIQUID FILLED ORAL at 20:26

## 2025-05-05 RX ADMIN — DOXAZOSIN MESYLATE 2 MG: 2 TABLET ORAL at 20:26

## 2025-05-05 RX ADMIN — GABAPENTIN 100 MG: 100 CAPSULE ORAL at 17:01

## 2025-05-05 RX ADMIN — SODIUM CHLORIDE, PRESERVATIVE FREE 10 ML: 5 INJECTION INTRAVENOUS at 20:22

## 2025-05-05 RX ADMIN — BUPROPION HYDROCHLORIDE 300 MG: 300 TABLET, EXTENDED RELEASE ORAL at 08:04

## 2025-05-05 RX ADMIN — GABAPENTIN 100 MG: 100 CAPSULE ORAL at 20:26

## 2025-05-05 RX ADMIN — OXYBUTYNIN CHLORIDE 10 MG: 10 TABLET, EXTENDED RELEASE ORAL at 08:05

## 2025-05-05 RX ADMIN — SODIUM CHLORIDE, PRESERVATIVE FREE 10 ML: 5 INJECTION INTRAVENOUS at 08:06

## 2025-05-05 RX ADMIN — HYDROCODONE BITARTRATE AND ACETAMINOPHEN 1 TABLET: 5; 325 TABLET ORAL at 16:59

## 2025-05-05 RX ADMIN — PIPERACILLIN AND TAZOBACTAM 3375 MG: 3; .375 INJECTION, POWDER, FOR SOLUTION INTRAVENOUS at 20:21

## 2025-05-05 RX ADMIN — ATORVASTATIN CALCIUM 80 MG: 80 TABLET, FILM COATED ORAL at 08:06

## 2025-05-05 RX ADMIN — GABAPENTIN 100 MG: 100 CAPSULE ORAL at 08:05

## 2025-05-05 ASSESSMENT — PAIN SCALES - GENERAL
PAINLEVEL_OUTOF10: 0
PAINLEVEL_OUTOF10: 5
PAINLEVEL_OUTOF10: 0
PAINLEVEL_OUTOF10: 5
PAINLEVEL_OUTOF10: 0

## 2025-05-05 ASSESSMENT — PAIN DESCRIPTION - LOCATION
LOCATION: LEG;FOOT
LOCATION: LEG;FOOT

## 2025-05-05 ASSESSMENT — PAIN DESCRIPTION - DESCRIPTORS
DESCRIPTORS: SORE;DISCOMFORT
DESCRIPTORS: ACHING

## 2025-05-05 ASSESSMENT — PAIN DESCRIPTION - ORIENTATION
ORIENTATION: RIGHT
ORIENTATION: RIGHT

## 2025-05-05 NOTE — PLAN OF CARE
Problem: Discharge Planning  Goal: Discharge to home or other facility with appropriate resources  Outcome: Progressing  Flowsheets (Taken 5/5/2025 0513)  Discharge to home or other facility with appropriate resources: Identify barriers to discharge with patient and caregiver     Problem: Pain  Goal: Verbalizes/displays adequate comfort level or baseline comfort level  Outcome: Progressing  Flowsheets (Taken 5/5/2025 0513)  Verbalizes/displays adequate comfort level or baseline comfort level:   Encourage patient to monitor pain and request assistance   Administer analgesics based on type and severity of pain and evaluate response   Consider cultural and social influences on pain and pain management   Assess pain using appropriate pain scale   Implement non-pharmacological measures as appropriate and evaluate response     Problem: Safety - Adult  Goal: Free from fall injury  Outcome: Progressing  Flowsheets (Taken 5/5/2025 0513)  Free From Fall Injury: Instruct family/caregiver on patient safety     Problem: Skin/Tissue Integrity - Adult  Goal: Incisions, wounds, or drain sites healing without S/S of infection  Outcome: Progressing  Flowsheets (Taken 5/5/2025 0513)  Incisions, Wounds, or Drain Sites Healing Without Sign and Symptoms of Infection: TWICE DAILY: Assess and document skin integrity     Problem: Genitourinary - Adult  Goal: Absence of urinary retention  Outcome: Progressing  Flowsheets (Taken 5/5/2025 0513)  Absence of urinary retention:   Assess patient’s ability to void and empty bladder   Place urinary catheter per Licensed Independent Practitioner order if needed   Monitor intake/output and perform bladder scan as needed

## 2025-05-05 NOTE — CARE COORDINATION
CM Note  Await [facility] Precert; therapy following  Electronically signed by Rajat Anderson RN on 5/5/2025 at 1:02 PM

## 2025-05-05 NOTE — CARE COORDINATION
5/5/25, 10:05 AM EDT  DISCHARGE PLANNING EVALUATION    DAVIE spoke with [facility] and they declined patient due to patient being from an ECF.     DAVIE spoke with Mariza at [facility] and asked her to start [facility] for [facility] to return to [facility]. DAVIE notified her that patient should be ready for discharge tomorrow.

## 2025-05-06 LAB
GLUCOSE BLD STRIP.AUTO-MCNC: 121 MG/DL (ref 70–108)
GLUCOSE BLD STRIP.AUTO-MCNC: 130 MG/DL (ref 70–108)
GLUCOSE BLD STRIP.AUTO-MCNC: 132 MG/DL (ref 70–108)
GLUCOSE BLD STRIP.AUTO-MCNC: 133 MG/DL (ref 70–108)

## 2025-05-06 PROCEDURE — 97116 GAIT TRAINING THERAPY: CPT

## 2025-05-06 PROCEDURE — 2500000003 HC RX 250 WO HCPCS: Performed by: SURGERY

## 2025-05-06 PROCEDURE — 82948 REAGENT STRIP/BLOOD GLUCOSE: CPT

## 2025-05-06 PROCEDURE — 2060000000 HC ICU INTERMEDIATE R&B

## 2025-05-06 PROCEDURE — APPSS30 APP SPLIT SHARED TIME 16-30 MINUTES: Performed by: PHYSICIAN ASSISTANT

## 2025-05-06 PROCEDURE — 97110 THERAPEUTIC EXERCISES: CPT

## 2025-05-06 PROCEDURE — 6370000000 HC RX 637 (ALT 250 FOR IP): Performed by: SURGERY

## 2025-05-06 PROCEDURE — 6370000000 HC RX 637 (ALT 250 FOR IP): Performed by: INTERNAL MEDICINE

## 2025-05-06 PROCEDURE — 2580000003 HC RX 258: Performed by: INTERNAL MEDICINE

## 2025-05-06 PROCEDURE — 6360000002 HC RX W HCPCS: Performed by: SURGERY

## 2025-05-06 PROCEDURE — 6360000002 HC RX W HCPCS: Performed by: INTERNAL MEDICINE

## 2025-05-06 RX ADMIN — ATORVASTATIN CALCIUM 80 MG: 80 TABLET, FILM COATED ORAL at 08:45

## 2025-05-06 RX ADMIN — BUPROPION HYDROCHLORIDE 300 MG: 300 TABLET, EXTENDED RELEASE ORAL at 08:45

## 2025-05-06 RX ADMIN — ENOXAPARIN SODIUM 40 MG: 100 INJECTION SUBCUTANEOUS at 08:45

## 2025-05-06 RX ADMIN — AMOXICILLIN AND CLAVULANATE POTASSIUM 1 TABLET: 875; 125 TABLET, FILM COATED ORAL at 11:30

## 2025-05-06 RX ADMIN — HYDROCODONE BITARTRATE AND ACETAMINOPHEN 1 TABLET: 5; 325 TABLET ORAL at 23:09

## 2025-05-06 RX ADMIN — POLYETHYLENE GLYCOL 3350 17 G: 17 POWDER, FOR SOLUTION ORAL at 08:45

## 2025-05-06 RX ADMIN — SODIUM CHLORIDE, PRESERVATIVE FREE 10 ML: 5 INJECTION INTRAVENOUS at 23:30

## 2025-05-06 RX ADMIN — PIPERACILLIN AND TAZOBACTAM 3375 MG: 3; .375 INJECTION, POWDER, FOR SOLUTION INTRAVENOUS at 04:03

## 2025-05-06 RX ADMIN — GABAPENTIN 100 MG: 100 CAPSULE ORAL at 20:16

## 2025-05-06 RX ADMIN — DOCUSATE SODIUM 100 MG: 100 CAPSULE, LIQUID FILLED ORAL at 08:45

## 2025-05-06 RX ADMIN — PALIPERIDONE 12 MG: 3 TABLET, EXTENDED RELEASE ORAL at 08:45

## 2025-05-06 RX ADMIN — ASPIRIN 81 MG: 81 TABLET, CHEWABLE ORAL at 08:45

## 2025-05-06 RX ADMIN — Medication 1 TABLET: at 08:45

## 2025-05-06 RX ADMIN — DOXAZOSIN MESYLATE 2 MG: 2 TABLET ORAL at 20:16

## 2025-05-06 RX ADMIN — GABAPENTIN 100 MG: 100 CAPSULE ORAL at 15:00

## 2025-05-06 RX ADMIN — DOCUSATE SODIUM 100 MG: 100 CAPSULE, LIQUID FILLED ORAL at 20:16

## 2025-05-06 RX ADMIN — SODIUM CHLORIDE, PRESERVATIVE FREE 10 ML: 5 INJECTION INTRAVENOUS at 08:45

## 2025-05-06 RX ADMIN — AMOXICILLIN AND CLAVULANATE POTASSIUM 1 TABLET: 875; 125 TABLET, FILM COATED ORAL at 20:16

## 2025-05-06 RX ADMIN — GABAPENTIN 100 MG: 100 CAPSULE ORAL at 08:45

## 2025-05-06 RX ADMIN — FINASTERIDE 5 MG: 5 TABLET, FILM COATED ORAL at 08:45

## 2025-05-06 RX ADMIN — LISINOPRIL 10 MG: 10 TABLET ORAL at 08:45

## 2025-05-06 RX ADMIN — OXYBUTYNIN CHLORIDE 10 MG: 10 TABLET, EXTENDED RELEASE ORAL at 08:45

## 2025-05-06 RX ADMIN — PANTOPRAZOLE SODIUM 40 MG: 40 TABLET, DELAYED RELEASE ORAL at 05:29

## 2025-05-06 ASSESSMENT — PAIN DESCRIPTION - DESCRIPTORS: DESCRIPTORS: SORE

## 2025-05-06 ASSESSMENT — PAIN SCALES - GENERAL
PAINLEVEL_OUTOF10: 5
PAINLEVEL_OUTOF10: 0

## 2025-05-06 ASSESSMENT — PAIN DESCRIPTION - ORIENTATION: ORIENTATION: RIGHT

## 2025-05-06 ASSESSMENT — PAIN DESCRIPTION - LOCATION: LOCATION: FOOT

## 2025-05-06 NOTE — PLAN OF CARE
Problem: Chronic Conditions and Co-morbidities  Goal: Patient's chronic conditions and co-morbidity symptoms are monitored and maintained or improved  Outcome: Progressing  Flowsheets (Taken 5/6/2025 0010)  Care Plan - Patient's Chronic Conditions and Co-Morbidity Symptoms are Monitored and Maintained or Improved:   Monitor and assess patient's chronic conditions and comorbid symptoms for stability, deterioration, or improvement   Collaborate with multidisciplinary team to address chronic and comorbid conditions and prevent exacerbation or deterioration   Update acute care plan with appropriate goals if chronic or comorbid symptoms are exacerbated and prevent overall improvement and discharge     Problem: Discharge Planning  Goal: Discharge to home or other facility with appropriate resources  Outcome: Progressing  Flowsheets (Taken 5/6/2025 0010)  Discharge to home or other facility with appropriate resources:   Identify barriers to discharge with patient and caregiver   Arrange for needed discharge resources and transportation as appropriate   Identify discharge learning needs (meds, wound care, etc)     Problem: Pain  Goal: Verbalizes/displays adequate comfort level or baseline comfort level  Outcome: Progressing  Flowsheets (Taken 5/6/2025 0010)  Verbalizes/displays adequate comfort level or baseline comfort level:   Encourage patient to monitor pain and request assistance   Assess pain using appropriate pain scale   Administer analgesics based on type and severity of pain and evaluate response   Implement non-pharmacological measures as appropriate and evaluate response   Consider cultural and social influences on pain and pain management   Notify Licensed Independent Practitioner if interventions unsuccessful or patient reports new pain     Problem: Safety - Adult  Goal: Free from fall injury  Outcome: Progressing  Flowsheets (Taken 5/6/2025 0010)  Free From Fall Injury:   Instruct family/caregiver on

## 2025-05-06 NOTE — PLAN OF CARE
Problem: Chronic Conditions and Co-morbidities  Goal: Patient's chronic conditions and co-morbidity symptoms are monitored and maintained or improved  5/6/2025 0920 by Lamar Day, RN  Outcome: Progressing  Flowsheets (Taken 5/6/2025 0830)  Care Plan - Patient's Chronic Conditions and Co-Morbidity Symptoms are Monitored and Maintained or Improved:   Monitor and assess patient's chronic conditions and comorbid symptoms for stability, deterioration, or improvement   Collaborate with multidisciplinary team to address chronic and comorbid conditions and prevent exacerbation or deterioration   Update acute care plan with appropriate goals if chronic or comorbid symptoms are exacerbated and prevent overall improvement and discharge     Problem: Discharge Planning  Goal: Discharge to home or other facility with appropriate resources  5/6/2025 0920 by Lamar Day, RN  Outcome: Progressing  Flowsheets (Taken 5/6/2025 0830)  Discharge to home or other facility with appropriate resources:   Identify barriers to discharge with patient and caregiver   Arrange for needed discharge resources and transportation as appropriate   Identify discharge learning needs (meds, wound care, etc)     Problem: Pain  Goal: Verbalizes/displays adequate comfort level or baseline comfort level  5/6/2025 0920 by Lamar Day, RN  Outcome: Progressing  Flowsheets (Taken 5/6/2025 0830)  Verbalizes/displays adequate comfort level or baseline comfort level:   Encourage patient to monitor pain and request assistance   Assess pain using appropriate pain scale   Administer analgesics based on type and severity of pain and evaluate response   Implement non-pharmacological measures as appropriate and evaluate response     Problem: Safety - Adult  Goal: Free from fall injury  5/6/2025 0920 by Lamar Day, RN  Outcome: Progressing     Problem: Skin/Tissue Integrity  Goal: Skin integrity remains intact  Description: 1.

## 2025-05-07 VITALS
TEMPERATURE: 98 F | RESPIRATION RATE: 20 BRPM | OXYGEN SATURATION: 99 % | BODY MASS INDEX: 21.05 KG/M2 | DIASTOLIC BLOOD PRESSURE: 61 MMHG | SYSTOLIC BLOOD PRESSURE: 129 MMHG | HEIGHT: 68 IN | WEIGHT: 138.89 LBS | HEART RATE: 60 BPM

## 2025-05-07 LAB
GLUCOSE BLD STRIP.AUTO-MCNC: 137 MG/DL (ref 70–108)
GLUCOSE BLD STRIP.AUTO-MCNC: 142 MG/DL (ref 70–108)

## 2025-05-07 PROCEDURE — 6370000000 HC RX 637 (ALT 250 FOR IP): Performed by: INTERNAL MEDICINE

## 2025-05-07 PROCEDURE — 6360000002 HC RX W HCPCS: Performed by: SURGERY

## 2025-05-07 PROCEDURE — 6370000000 HC RX 637 (ALT 250 FOR IP): Performed by: SURGERY

## 2025-05-07 PROCEDURE — 2500000003 HC RX 250 WO HCPCS

## 2025-05-07 PROCEDURE — 82948 REAGENT STRIP/BLOOD GLUCOSE: CPT

## 2025-05-07 PROCEDURE — 97116 GAIT TRAINING THERAPY: CPT

## 2025-05-07 PROCEDURE — 97110 THERAPEUTIC EXERCISES: CPT

## 2025-05-07 PROCEDURE — APPSS60 APP SPLIT SHARED TIME 46-60 MINUTES: Performed by: PHYSICIAN ASSISTANT

## 2025-05-07 RX ADMIN — DOCUSATE SODIUM 100 MG: 100 CAPSULE, LIQUID FILLED ORAL at 09:24

## 2025-05-07 RX ADMIN — GABAPENTIN 100 MG: 100 CAPSULE ORAL at 09:26

## 2025-05-07 RX ADMIN — FINASTERIDE 5 MG: 5 TABLET, FILM COATED ORAL at 09:24

## 2025-05-07 RX ADMIN — PANTOPRAZOLE SODIUM 40 MG: 40 TABLET, DELAYED RELEASE ORAL at 06:25

## 2025-05-07 RX ADMIN — POLYVINYL ALCOHOL 1 DROP: 1.4 SOLUTION/ DROPS OPHTHALMIC at 09:24

## 2025-05-07 RX ADMIN — ASPIRIN 81 MG: 81 TABLET, CHEWABLE ORAL at 09:24

## 2025-05-07 RX ADMIN — ATORVASTATIN CALCIUM 80 MG: 80 TABLET, FILM COATED ORAL at 09:25

## 2025-05-07 RX ADMIN — BUPROPION HYDROCHLORIDE 300 MG: 300 TABLET, EXTENDED RELEASE ORAL at 09:26

## 2025-05-07 RX ADMIN — SODIUM CHLORIDE, PRESERVATIVE FREE 10 ML: 5 INJECTION INTRAVENOUS at 09:31

## 2025-05-07 RX ADMIN — LISINOPRIL 10 MG: 10 TABLET ORAL at 09:25

## 2025-05-07 RX ADMIN — ENOXAPARIN SODIUM 40 MG: 100 INJECTION SUBCUTANEOUS at 09:23

## 2025-05-07 RX ADMIN — Medication 1 TABLET: at 09:25

## 2025-05-07 RX ADMIN — OXYBUTYNIN CHLORIDE 10 MG: 10 TABLET, EXTENDED RELEASE ORAL at 09:26

## 2025-05-07 RX ADMIN — AMOXICILLIN AND CLAVULANATE POTASSIUM 1 TABLET: 875; 125 TABLET, FILM COATED ORAL at 09:25

## 2025-05-07 RX ADMIN — PALIPERIDONE 12 MG: 3 TABLET, EXTENDED RELEASE ORAL at 09:26

## 2025-05-07 ASSESSMENT — PAIN SCALES - GENERAL: PAINLEVEL_OUTOF10: 0

## 2025-05-07 NOTE — CARE COORDINATION
5/7/25, 9:22 AM EDT    Patient goals/plan/ treatment preferences discussed by  and .  Patient goals/plan/ treatment preferences reviewed with patient/ family.  Patient/ family verbalize understanding of discharge plan and are in agreement with goal/plan/treatment preferences.  Understanding was demonstrated using the teach back method.  AVS provided by RN at time of discharge, which includes all necessary medical information pertaining to the patients current course of illness, treatment, post-discharge goals of care, and treatment preferences.     Services At/After Discharge: Skilled Nursing Facility (SNF) [facility]    DAVIE received a call from Mariza at [facility] and she reported that [facility].    Patient to discharge back to [facility].     DAVIE scheduled transportation for 2pm.    DAVIE notified Mariza at [facility].    RN made aware and discharge instructions placed on chart.

## 2025-05-07 NOTE — PROGRESS NOTES
Progress note: Infectious diseases    Patient - Rajat Haywood,  Age - 75 y.o.    - 1950      Room Number - 4K-04/004-A   MRN -  [id number]   [facility] # - [id number]  Date of Admission -  2025 11:19 AM    SUBJECTIVE:   Going for surgery later today  OBJECTIVE   VITALS    height is 1.715 m (5' 7.5\") and weight is 62.1 kg (137 lb). His oral temperature is 97.9 °F (36.6 °C). His blood pressure is 108/55 (abnormal) and his pulse is 62. His respiration is 20 and oxygen saturation is 94%.       Wt Readings from Last 3 Encounters:   25 62.1 kg (137 lb)   25 66 kg (145 lb 8.1 oz)   25 56.4 kg (124 lb 4.8 oz)       I/O (24 Hours)    Intake/Output Summary (Last 24 hours) at 2025 0858  Last data filed at 2025 0401  Gross per 24 hour   Intake 250 ml   Output 550 ml   Net -300 ml       General Appearance  Awake, alert, oriented,  not  In acute distress  HEENT - normocephalic, atraumatic, pink conjunctiva,  anicteric sclera  Neck - Supple, no mass  Lungs -  Bilateral good air entry, no rhonchi, no wheeze  Cardiovascular - Heart sounds are normal.   .  Abdomen - soft, not distended, nontender,   Neurologic -oriented  Skin - No bruising or bleeding  Extremities - dressed right foot. Has necrotic foul smelling 3d toe  MEDICATIONS:      povidone-iodine   Topical Daily    [Held by provider] heparin (porcine)  5,000 Units SubCUTAneous 3 times per day    piperacillin-tazobactam  3,375 mg IntraVENous Q8H    aspirin  81 mg Oral Daily    atorvastatin  80 mg Oral Daily    buPROPion  300 mg Oral QAM    docusate sodium  100 mg Oral BID    [Held by provider] doxazosin  4 mg Oral Nightly    finasteride  5 mg Oral Daily    gabapentin  100 mg Oral TID    [Held by provider] lisinopril  10 mg Oral Daily    multivitamin  1 tablet Oral Daily    pantoprazole  40 mg Oral QAM AC    oxyBUTYnin  10 mg Oral Daily    paliperidone  12 
                                                                                          Progress note: Infectious diseases    Patient - Rajat Haywood,  Age - 75 y.o.    - 1950      Room Number - 4K-04/004-A   MRN -  [id number]   [facility] # - [id number]  Date of Admission -  2025 11:19 AM    SUBJECTIVE:   No new issues.  OBJECTIVE   VITALS    height is 1.715 m (5' 7.5\") and weight is 63 kg (138 lb 14.2 oz). His oral temperature is 98.5 °F (36.9 °C). His blood pressure is 118/56 (abnormal) and his pulse is 64. His respiration is 16 and oxygen saturation is 97%.       Wt Readings from Last 3 Encounters:   25 63 kg (138 lb 14.2 oz)   25 66 kg (145 lb 8.1 oz)   25 56.4 kg (124 lb 4.8 oz)       I/O (24 Hours)    Intake/Output Summary (Last 24 hours) at 2025 1106  Last data filed at 2025 1105  Gross per 24 hour   Intake 540 ml   Output 1470 ml   Net -930 ml       General Appearance  Awake, alert, oriented,  not  In acute distress  HEENT - normocephalic, atraumatic, pink conjunctiva,  anicteric sclera  Neck - Supple, no mass  Lungs -  Bilateral   air entry,    Cardiovascular - Heart sounds are normal.   .  Abdomen - soft, not distended, nontender,   Neurologic -oriented  Skin - No bruising or bleeding  Extremities - dressed foot wound (post debridement)            MEDICATIONS:      lisinopril  10 mg Oral Daily    doxazosin  2 mg Oral Nightly    enoxaparin  40 mg SubCUTAneous Daily    piperacillin-tazobactam  3,375 mg IntraVENous Q8H    sodium chloride flush  5-40 mL IntraVENous 2 times per day    aspirin  81 mg Oral Daily    atorvastatin  80 mg Oral Daily    buPROPion  300 mg Oral QAM    docusate sodium  100 mg Oral BID    finasteride  5 mg Oral Daily    gabapentin  100 mg Oral TID    multivitamin  1 tablet Oral Daily    pantoprazole  40 mg Oral QAM AC    oxyBUTYnin  10 mg Oral Daily    paliperidone  12 mg Oral Daily    sodium chloride flush  5-40 mL IntraVENous 2 times per day    
                                                                                          Progress note: Infectious diseases    Patient - Rajat Haywood,  Age - 75 y.o.    - 1950      Room Number - 4K-04/004-A   MRN -  [id number]   [facility] # - [id number]  Date of Admission -  2025 11:19 AM    SUBJECTIVE:   No new issues  OBJECTIVE   VITALS    height is 1.715 m (5' 7.5\") and weight is 62.1 kg (137 lb). His oral temperature is 98.1 °F (36.7 °C). His blood pressure is 155/70 (abnormal) and his pulse is 65. His respiration is 17 and oxygen saturation is 97%.       Wt Readings from Last 3 Encounters:   25 62.1 kg (137 lb)   25 66 kg (145 lb 8.1 oz)   25 56.4 kg (124 lb 4.8 oz)       I/O (24 Hours)    Intake/Output Summary (Last 24 hours) at 5/3/2025 1321  Last data filed at 5/3/2025 0945  Gross per 24 hour   Intake 600 ml   Output 575 ml   Net 25 ml       General Appearance  Awake, alert, oriented,  not  In acute distress  HEENT - normocephalic, atraumatic, pink conjunctiva,  anicteric sclera  Neck - Supple, no mass  Lungs -  Bilateral good air entry, no rhonchi, no wheeze  Cardiovascular - Heart sounds are normal.   .  Abdomen - soft, not distended, nontender,   Neurologic -oriented  Skin - No bruising or bleeding  Extremities - dressed foot wound (post debridement)          MEDICATIONS:      enoxaparin  40 mg SubCUTAneous Daily    piperacillin-tazobactam  3,375 mg IntraVENous Q8H    sodium chloride flush  5-40 mL IntraVENous 2 times per day    aspirin  81 mg Oral Daily    atorvastatin  80 mg Oral Daily    buPROPion  300 mg Oral QAM    docusate sodium  100 mg Oral BID    finasteride  5 mg Oral Daily    gabapentin  100 mg Oral TID    multivitamin  1 tablet Oral Daily    pantoprazole  40 mg Oral QAM AC    oxyBUTYnin  10 mg Oral Daily    paliperidone  12 mg Oral Daily    sodium chloride flush  5-40 mL IntraVENous 2 times per day    insulin lispro  0-8 Units SubCUTAneous 4x Daily AC & HS      
                                                                                          Progress note: Infectious diseases    Patient - Rajat Haywood,  Age - 75 y.o.    - 1950      Room Number - 4K-04/004-A   MRN -  [id number]   [id number]  Date of Admission -  2025 11:19 AM    SUBJECTIVE:   No new complaints. Awaiting placement   OBJECTIVE   VITALS    height is 1.715 m (5' 7.5\") and weight is 63 kg (138 lb 14.2 oz). His oral temperature is 98 °F (36.7 °C). His blood pressure is 148/71 (abnormal) and his pulse is 62. His respiration is 18 and oxygen saturation is 98%.       Wt Readings from Last 3 Encounters:   25 63 kg (138 lb 14.2 oz)   25 66 kg (145 lb 8.1 oz)   25 56.4 kg (124 lb 4.8 oz)       I/O (24 Hours)    Intake/Output Summary (Last 24 hours) at 2025 0848  Last data filed at 2025 0633  Gross per 24 hour   Intake 690.09 ml   Output 600 ml   Net 90.09 ml       General Appearance  Awake, alert, oriented,  not  In acute distress  HEENT - normocephalic, atraumatic, pink conjunctiva,  anicteric sclera  Neck - Supple, no mass  Lungs -  Bilateral   air entry,    Cardiovascular - Heart sounds are normal.   .  Abdomen - soft, not distended, nontender,   Neurologic -oriented  Skin - No bruising or bleeding  Extremities - dressed foot wound (post debridement)            MEDICATIONS:      lisinopril  10 mg Oral Daily    doxazosin  2 mg Oral Nightly    enoxaparin  40 mg SubCUTAneous Daily    piperacillin-tazobactam  3,375 mg IntraVENous Q8H    sodium chloride flush  5-40 mL IntraVENous 2 times per day    aspirin  81 mg Oral Daily    atorvastatin  80 mg Oral Daily    buPROPion  300 mg Oral QAM    docusate sodium  100 mg Oral BID    finasteride  5 mg Oral Daily    gabapentin  100 mg Oral TID    multivitamin  1 tablet Oral Daily    pantoprazole  40 mg Oral QAM AC    oxyBUTYnin  10 mg Oral Daily    paliperidone  12 mg Oral Daily    sodium chloride flush  5-40 mL 
                                                                                          Progress note: Infectious diseases    Patient - Rajat Haywood,  Age - 75 y.o.    - 1950      Room Number - 4K-04/004-A   MRN -  [id number]   [facility] # - [id number]  Date of Admission -  2025 11:19 AM    SUBJECTIVE:   No new complaints. Plan for discharge.  OBJECTIVE   VITALS    height is 1.715 m (5' 7.5\") and weight is 63 kg (138 lb 14.2 oz). His oral temperature is 98 °F (36.7 °C). His blood pressure is 129/61 and his pulse is 60. His respiration is 20 and oxygen saturation is 99%.       Wt Readings from Last 3 Encounters:   25 63 kg (138 lb 14.2 oz)   25 66 kg (145 lb 8.1 oz)   25 56.4 kg (124 lb 4.8 oz)       I/O (24 Hours)    Intake/Output Summary (Last 24 hours) at 2025 1217  Last data filed at 2025 1046  Gross per 24 hour   Intake 462.74 ml   Output 2325 ml   Net -1862.26 ml       General Appearance  Awake, alert, oriented,  not  In acute distress  HEENT - normocephalic, atraumatic, pink conjunctiva,  anicteric sclera  Neck - Supple, no mass  Lungs -  Bilateral   air entry,    Cardiovascular - Heart sounds are normal.   .  Abdomen - soft, not distended, nontender,   Neurologic -oriented  Skin - No bruising or bleeding  Extremities - dressed foot wound had a hematoma              MEDICATIONS:      amoxicillin-clavulanate  1 tablet Oral 2 times per day    lisinopril  10 mg Oral Daily    doxazosin  2 mg Oral Nightly    enoxaparin  40 mg SubCUTAneous Daily    sodium chloride flush  5-40 mL IntraVENous 2 times per day    aspirin  81 mg Oral Daily    atorvastatin  80 mg Oral Daily    buPROPion  300 mg Oral QAM    docusate sodium  100 mg Oral BID    finasteride  5 mg Oral Daily    gabapentin  100 mg Oral TID    multivitamin  1 tablet Oral Daily    pantoprazole  40 mg Oral QAM AC    oxyBUTYnin  10 mg Oral Daily    paliperidone  12 mg Oral Daily    sodium chloride flush  5-40 mL IntraVENous 2 
                                             Post-fall Pharmacist Review    Pharmacy has been notified to review the medication profile for fall risk post-fall  Medications increasing risk of falling: Bupropion  mg QAM, Gabapentin 100 mg TID, Hydrocodone-APAP 5-325 mg Q4H PRN, Oxybutynin XL 10 mg daily, Invega 12 mg daily  Medications increasing risk of bleeding: Aspirin 81 mg, Enoxaparin 40 mg,   Recommendations: None at this time.  Hydrocodone-APAP- last dose ws 5/3 1959  Findings discussed with Jacque, pt was lowered to ground, did not hit head     Cassi Melendez PharmD 5/4/2025 2:54 PM      
    Pharmacist Review and Automatic Dose Adjustment of Prophylactic Enoxaparin or Heparin    Reviewed reason for admission/hospital problem list    The reviewing pharmacist has made an adjustment to the ordered enoxaparin or heparin dose or converted to heparin per the approved [facility] protocol and table as identified below.      Recent Labs     05/01/25  0447 05/02/25 0415 05/03/25  0423   CREATININE 0.7 0.6* 0.6*     Estimated Creatinine Clearance: 96 mL/min (A) (based on SCr of 0.6 mg/dL (L)).    Recent Labs     05/02/25 0415 05/03/25  0423   HGB 13.0* 12.7*   HCT 41.2* 40.4*    296     No results for input(s): \"INR\" in the last 72 hours.    Height:   Ht Readings from Last 1 Encounters:   04/30/25 1.715 m (5' 7.5\")     Weight:  Wt Readings from Last 1 Encounters:   04/30/25 62.1 kg (137 lb)       *Do not exceed enoxaparin 40mg daily or UFH 5000 units SUBQ TID in patients with epidurals,  lumbar drains, or external ventricular drains.    Plan:   SubQ heparin order unheld by provider. Changed heparin 5,000 units subq TID to enoxaparin 40 mg subq daily.     Thank you,  Meghna Sparks, PharmD, BCPS 5/3/2025 9:41 AM        
   Pharmacy Antibiotic Time Adjustment for Surgery    Rajat Haywood is a 75 y.o. male scheduled for surgery. Pharmacy will adjust pre-op antibiotic to optimize timing in relation to surgery per P&T approved policy.    Weight:  Wt Readings from Last 1 Encounters:   04/30/25 62.1 kg (137 lb)     Body mass index is 21.14 kg/m².    Plan:   Zosyn has been re-timed to be given as pre-op antibiotic coverage                          Cristela Moreno, Pharm.D., BCPS, BCCCP 5/2/2025 10:16 AM      
 [facility]  INPATIENT PHYSICAL THERAPY  ReAssessment/DAILY NOTE  STRZ ICU STEPDOWN TELEMETRY 4K - 4K-04/004-A      Discharge Recommendations: ECF with PT  Equipment Recommendations: No             Time In: 928  Time Out: 947  Timed Code Treatment Minutes: 10 Minutes  Minutes: 19          Date: 2025  Patient Name: Rajat Haywood,  Gender:  male        MRN: [id number]  : 1950  (75 y.o.)     Referring Practitioner: Garret Collado MD  Diagnosis: Gangrene of toe of right foot (HCC)  Additional Pertinent Hx: HPI: Rajat Haywood is an 75 y.o. male with right foot gangrene, PAD. He presents for a right common femoral to anterior tibial artery bypass. No changes since his angiogram.  Patient is s/p (R) 2nd toe amputation on 25 by Dr MADELINE Elizabeth, DPM.     Prior Level of Function:  Lives With:  (Deedee Lena)  Type of Home: Facility  Home Layout: One level  Home Equipment: Walker - Rolling, Wheelchair - Manual        Prior Level of Assist for ADLs: Needs assistance  Prior Level of Assist for Homemaking: Needs assistance  Homemaking Responsibilities: No  Ambulation Assistance: Needs assistance  Prior Level of Assist for Transfers: Needs assistance  Active : No  Additional Comments: Pt completes pivot t/fs to/from w/c with staff assist.  Prior Level of Assist for Ambulation:  (Amubulatory short distances with assistance)  Has the patient had two or more falls in the past year or any fall with injury in the past year?: Yes    Restrictions/Precautions:  Restrictions/Precautions: General Precautions, Fall Risk, Weight Bearing  Right Lower Extremity Weight Bearing: Partial Weight Bearing  Partial Weight Bearing Percentage Or Pounds: Heel WB with post op shoe for transfers and balance  Required Braces or Orthoses  Right Lower Extremity Brace:  (Post-op shoe)  Position Activity Restriction  Other Position/Activity Restrictions: *post op shoe Rt foot     SUBJECTIVE: Pt resting in bed and agrees to 
 [facility]  INPATIENT PHYSICAL THERAPY  DAILY NOTE  STRZ ICU STEPDOWN TELEMETRY 4K - 4K-04/004-A      Discharge Recommendations: ECF with PT  Equipment Recommendations: No               Time In: 0852  Time Out: 915  Timed Code Treatment Minutes: 23 Minutes  Minutes: 23          Date: 2025  Patient Name: Rajat Haywood,  Gender:  male        MRN: [id number]  : 1950  (75 y.o.)     Referring Practitioner: Garret Collado MD  Diagnosis: Gangrene of toe of right foot (HCC)  Additional Pertinent Hx: HPI: Rajat Haywood is an 75 y.o. male with right foot gangrene, PAD. He presents for a right common femoral to anterior tibial artery bypass. No changes since his angiogram.  Patient is s/p (R) 2nd toe amputation on 25 by Dr MADELINE Elizabeth, DPARIANNA.     Prior Level of Function:  Lives With:  (Alatna [place])  Type of Home: Facility  Home Layout: One level  Home Equipment: Walker - Rolling, Wheelchair - Manual        Prior Level of Assist for ADLs: Needs assistance  Prior Level of Assist for Homemaking: Needs assistance  Homemaking Responsibilities: No  Ambulation Assistance: Needs assistance  Prior Level of Assist for Transfers: Needs assistance  Active : No  Additional Comments: Pt completes pivot t/fs to/from w/c with staff assist.  Prior Level of Assist for Ambulation:  (Amubulatory short distances with assistance)  Has the patient had two or more falls in the past year or any fall with injury in the past year?: Yes    Restrictions/Precautions:  Restrictions/Precautions: General Precautions, Fall Risk, Weight Bearing  Right Lower Extremity Weight Bearing: Partial Weight Bearing  Partial Weight Bearing Percentage Or Pounds: Heel WB with post op shoe for transfers and balance  Required Braces or Orthoses  Right Lower Extremity Brace:  (Post-op shoe)  Position Activity Restriction  Other Position/Activity Restrictions: *post op shoe Rt foot     SUBJECTIVE: RN approved session. Patient in bed and 
1338- Secure message sent to Dr. Collado for patient. Pt got up to chair after bedrest order was discontinued and right lower medial incision started bleeding. Dressing saturated. Pressure held to site.     House supervisor in room attempting for doppler pulses.     1346 call placed to Ryan WATT for evaluation of patient. En route to 4k04     ALYSA Rodriguez, and Anay Currie at bedside. Dressing was removed and site evaluated. Stated this was the harvest site that was draining. New dressing applied by Anay with dry gauze, ABDs, and tape. ACE wrap up to where bleeding site was on lower medial right extremity. Pulses present. CBC ordered per Ryan Barr.    1420- Dr. Collado at bedside to evaluate. Notified of decreasing BP's, 500 LR bolus ordered. BP meds adjusted/held. Dressing remains dry and intact.       
1552 watch bagged and labeled with inpatient label and hung on inpatent IV pole  
2124: Pt arrives to pacu. Eyes closed and respirations easy and unlabored. Pt on room air and spO2 97%. Pt awakens easily to voice. Oriented to self and place. Pt denies any nausea. Pt reports pain in right lower leg. Olivo-Schumacher FACES 6. X11 incision sites from right groin to right foot with silver dressing in place and dry/intact. No drainage noted. Ace wrap to right foot clean, dry, and intact. Doppler present to anterior tibial artery on right foot. Pt able to move right leg/foot. Skin to RLE warm, dry, and appropriate for ethnicity. Pt reports numbness(chronic) to bilateral lower legs/feet. CRNA at pt bedside and administered pain meds per MAR. VSS.     2135: Pt resting in bed with eyes open. Respirations easy and unlabored. Pt easily closes eyes. VSS.     2140:     2145: Pt resting in bed with eyes closed. Respirations easy and unlabored. Pt appears comfortable. VSS.     2150: Phone call to . Spoke with nurse Palmer and report given.     2154: Pt meets criteria for pacu discharge.     2212: Pt transported to - via RN in stable condition. No family in waiting area. Pt's personal belongings already brought to [facility] via [facility] nurse.         
Belongings taken to 4K room 4. No family with pt.   
[facility]  PHYSICAL THERAPY MISSED TREATMENT NOTE  STRZ ICU STEPDOWN TELEMETRY 4K    Date: 2025  Patient Name: Rajat Haywood        MRN: [id number]   : 1950  (75 y.o.)  Gender: male                REASON FOR MISSED TREATMENT:  Patient was on bedrest until 1300.  When PT checked on patient following that time, nurse in with patient managing bleed.  PT will check back when medically appropriate/as able .             
[facility]  STRZ ICU STEPDOWN TELEMETRY 4K  Occupational Therapy  Daily Note    Discharge Recommendations: Subacute/skilled nursing facility  Equipment Recommendations: No         Time In: 1317  Time Out: 1341  Timed Code Treatment Minutes: 24 Minutes  Minutes: 24          Date: 2025  Patient Name: Rajat Haywood,   Gender: male      Room: [facility]04/004-A  MRN: [id number]  : 1950  (75 y.o.)  Referring Practitioner: Garret Collado MD  Diagnosis: Gangrene of toe of right foot (HCC)  Additional Pertinent Hx: 76 yo male with schizophrenia, tobacco abuse, DM2, PAD, presents with right 2nd toe gangrene, CLI. 25 Dr Collado:1. Right common femoral to anterior tibial artery bypass with reversed right greater saphenous vein 2. Right common femoral and profunda femoris endarterectomies. s/p Amputation of right second toe at metatarsophalangeal joint level on 2025    Restrictions/Precautions:  Restrictions/Precautions: General Precautions, Fall Risk, Weight Bearing  Right Lower Extremity Weight Bearing: Partial Weight Bearing  Partial Weight Bearing Percentage Or Pounds: Heel WB with post op shoe for transfers and balance  Required Braces or Orthoses  Right Lower Extremity Brace:  (Post-op shoe)  Position Activity Restriction  Other Position/Activity Restrictions: *post op shoe Rt foot      Social/Functional History:  Lives With:  (Deedee Bond)  Type of Home: Facility  Home Layout: One level  Home Equipment: Walker - Rolling, Wheelchair - Manual           Prior Level of Assist for ADLs: Needs assistance  Prior Level of Assist for Homemaking: Needs assistance  Homemaking Responsibilities: No  Ambulation Assistance: Needs assistance  Prior Level of Assist for Transfers: Needs assistance  Prior Level of Assist for Ambulation:  (Amubulatory short distances with assistance)  Has the patient had two or more falls in the past year or any fall with injury in the past year?: Yes    Active : 
Comprehensive Nutrition Assessment    Type and Reason for Visit:  Initial, Consult (\" Needs like prostat heavy protein supplements\")    Nutrition Recommendations/Plan:   Continue current diet per orders.  [facility]ID ( [facility]  Send Manny BID.  Encouraged lean protein choice with each meal to aid with wound healing.  Continue MVI.   Encouraged high fiber intake & gave examples. Offered to send decaf hot beverage & pt declines to aid with constipation.      Malnutrition Assessment:  Malnutrition Status:  Moderate malnutrition (05/05/25 1348)    Context:  Chronic Illness     Findings of the 6 clinical characteristics of malnutrition:  Energy Intake:   (PO 26-75% here LOS day 5, I called Deedee Bond & no answer, pt mentions has been at [facility] for ~ 1 month a& reports was eating % there however mention s had been poorly < 75% prior to that when he was at home)  Weight Loss:   (19.3% wt loss in 8 years, 4.8% wt loss in 10 days)     Body Fat Loss:  Mild body fat loss Orbital, Buccal region, Fat Overlying Ribs, Triceps   Muscle Mass Loss:  Mild muscle mass loss Clavicles (pectoralis & deltoids), Temples (temporalis) (bandage on right leg)  Fluid Accumulation:  Unable to assess     Strength:  Not Performed    Nutrition Assessment:     Pt. moderately malnourished AEB criteria as listed above.  At risk for further nutrition compromise r/t  admit with critical limb ischemia of right lower extremity with gangrene & increased needs for wound healing( see below), pt s/p right second toe amputation (5/2/25),  and underlying medical condition (anxiety, depression).        Nutrition Related Findings:    Pt. Report/Treatments/Miscellaneous: Pt seen ~1100, appears thin, mentions feels constipated & reports \" I got something for it this morning\". It appears pt received Colace per documentation this morning. Pt declines a decaf hot beverage & reports has had diabetic diet education in there past & has 
[facility]  OCCUPATIONAL THERAPY MISSED TREATMENT NOTE  STRZ ICU STEPDOWN TELEMETRY 4K  4K-004-A      Date: 2025  Patient Name: Rajat Haywood        CSN: [id number]   : 1950  (75 y.o.)  Gender: male   Referring Practitioner: Garret Collado MD            REASON FOR MISSED TREATMENT: Patient Refused.  Patient laying in bed upon arrival; politely declines at this time stating \"I already had therapy this date\".  Educated regarding OT services however patient continues to decline at this time.  Patient to d/c @2pm today.                  
[facility] Contin[facility] Nurse  Progress Note       Rajat Haywood  AGE: 75 y.o.   GENDER: male  : 1950  UNIT: 4K-04/004-A  TODAY'S DATE:  2025  ADMISSION DATE: 2025 11:19 AM    Subjective:       Rajat Haywood is a 75 y.o. male referred by:   [x] Physician/ Resident/ PA/ LOYDA-CNP  [] Nursing  [] Other:     Summary:      Wound ostomy consult received for stage 2 buttocks. Documentation reviewed. Staff documenting stage 2 pressure injury to buttocks. Recommend staff to utilize [facility] order sets. See below. If wound evolves during hospital admission, please call.     Treatment Recommendations:  Buttock wounds- Cleanse wound with normal saline or wound cleanser and gauze. Pat dry with clean gauze. Apply Triad barrier cream to wounds twice daily and PRN. If cream becomes soiled, wipe off top layer and reapply.       -Turn every 2 hours and PRN  -Offload with pillows or wedges  -Ensure patient is on low air loss support surface ([place], SPR+, Jo-Ann, Bariatric bed)  -Utilize moisture wicking underpads  -Limit use of depends, except when working with therapy  -If applicable, use waffle cushion when in chair  
Occupational Therapy  [facility]  INPATIENT OCCUPATIONAL THERAPY  STRZ ICU STEPDOWN TELEMETRY 4K  EVALUATION      Discharge Recommendations: Subacute/Skilled Nursing Facility  Equipment Recommendations: No      Time In: 925  Time Out: 945  Timed Code Treatment Minutes: 12 Minutes  Minutes: 20          Date: 2025  Patient Name: Rajat Haywood,   Gender: male      MRN: [id number]  : 1950  (75 y.o.)  Referring Practitioner: Garret Collado MD  Diagnosis: Gangrene of toe of right foot (HCC)  Additional Pertinent Hx: 74 yo male with schizophrenia, tobacco abuse, DM2, PAD, presents with right 2nd toe gangrene, CLI. 25 Dr Collado:1. Right common femoral to anterior tibial artery bypass with reversed right greater saphenous vein 2. Right common femoral and profunda femoris endarterectomies    Restrictions/Precautions:  Restrictions/Precautions: General Precautions, Fall Risk  Position Activity Restriction  Other Position/Activity Restrictions: *post op shoe Rt foot    Subjective  Chart Reviewed: Yes, Orders, Progress Notes, History and Physical, Operative Notes  Patient assessed for rehabilitation services?: Yes  Family / Caregiver Present: No         Pain: Pt reports pain is \"minimal\" as he was recently given pain meds    Vitals: Vitals not assessed per clinical judgement, see nursing flowsheet    Social/Functional History:  Lives With: Other (Comment)  Type of Home: Facility           Prior Level of Assist for ADLs: Needs assistance  Prior Level of Assist for Homemaking: Needs assistance  Homemaking Responsibilities: No  Ambulation Assistance: Needs assistance  Prior Level of Assist for Transfers: Needs assistance       Active : No  Leisure & Hobbies: attends [employer] service every Wednesday  Additional Comments: Pt completes pivot t/fs to/from w/c with staff assist.    VISION:WFL    HEARING:  WFL    COGNITION: Slow Processing    RANGE OF MOTION:  Bilateral Upper Extremity:  
PAT Call Date:  4/28   Surgery Date: 5/2    Surgeon: Heaven   Surgery: rt fem bypass    Any Isolation Precautions? No      Type of Isolation Precaution: Not Applicable   Is patient from a nursing home? Yes  [facility]: [facility]  Any equipment assist needed for moving patient? No   Type of Equipment: Not Applicable  Patient last weight: 145 LB     Hard Copy on Chart  In EPIC Pending/Notes   Consent -   Within 30 days; signed, dated & timed by patient and physician     [] On Arrival     [] Blood      [] DNR   H&P -   Within 30 days    [] Physician To Do     Clearance -      []Medical     []Cardiac     [] Pulmonary    Orders -   Signed and Dated      [] Physician To Do    Labs -   Within 3 months   4/25  [x] CBC-ok    [x] BMP-ok   [x] GFR-ok   [] INR    [] PTT    [] Urine    [] Liver Enzymes    [] MRSA Nasal      Others:     Radiology Studies -   Within 1 year  4/25 4/25  [x] Chest X-Ray-ok   [] MRI    [] CT    [x] Vascular   [] US     Pulmonary -     [] EB   [] CPAP     Cardiac Workup -   Stress Test, Echo, Cath within 18 months  3/17      4/25  [x] EKG-ok      [] Cath                 [] Stress Test                      [x] Echo/GI 55-60%   [] CABG   [] Holter Monitor      [] Pacemaker/ICD        Brand:        Where does patient have checked:         Last check:         Rep Notified:           
PAT call attempted with nurse at [facility] after 2 failed attempts at transfer phoned back and left  a message to have nurse call PAT when she gets a chance today.  
PIV removed. Report called and given to Deedee Bond. All questions answered. Report given to transport. All questions answered.   
Pharmacy Medication History Note    List of current medications patient is taking is complete.    Source of information: Deedee [place] MAR    Changes made to medication list:  Medications marked as not taking (include reason, ex. therapy complete or physician discontinued):  Cephalexin removed - therapy complete    Medications added/doses adjusted:  Added: Acetaminophen 650 mg po BID.  Patient also has an active order at [facility] for acetaminophen 650 mg q4h prn pain  Adjusted: Gabapentin 100 mg TID x 10 days was completed and now there is an active order for gabapentin 100 mg TID ongoing  Adjusted: Docusate BID changed to docusate 100 mg po QD  Adjusted: Cholecalciferol changed from 1000 units daily to 2000 units daily          Electronically signed by Gumaro Benton RPH on 5/1/2025 at 9:33 AM       
Post-Fall Assessment  Date of Fall:   5/4  Time of Fall:   1355   Yes No N/A Comment   Was Patient on [place] Program? [x] [] []    Was the Fall Witnessed? [x] [] []    Were Clothes a Factor? [] [x] []    Was Patient Wearing Corrective Footwear? [x] [] []    Other Environmental Factors Involved? [] [x] []      Description of Fall  Who found the patient: Nayla   Where was the patient at the time of the fall:  bathroom  Brief description of fall: Pt was walking to the bathroom by tech and had to be lowered to the ground   Patient comments regarding fall:   Pt denies hitting head and denies new pain  Medications potentially contributing to fall risk (such as Sedatives, Hypnotics, Antihypertensives, Narcotics, Psychotropics, Anticonvulsants):   Pt is on Invega and gabapentin    Patient Assessment of Injury    (Please document Vital Signs in Doc Flowsheet)     Yes No   Patient hit his/her head [] [x]   Patient is taking an anticoagulant [x] []   CT of Head requested [] [x]     Neurological Assessment Protocol:    If the patient has hit his/her head during this fall,   a Neurological Assessment must be completed every 2 hours for 12 hours;   every 3 hours for 24 hours;   then every 4 hours for 24 hours.  Document in Doc Flowsheets.    Neurological Assessment Protocol initiated  no    If the patient did not hit his/her head during this fall, monitor vital signs every 8 hours.  Notify the physician within 24 hours and document.      Physician Notification  Please document under “Provider Notification” group within the Assessment (Complex Assessment) template of Doc Flowsheets.     Physician notified yes    Pharmacy notified yes Time Notified: 1404    House Supervisor/Clinical Manager Notified:  Camila House supervisor notified  Date:   5/4/25 Time:   1400  Family Member Notified:   Sister Ban notified   Date: 5/4  Time:   1700       
Pt is a resident at [facility]    Who will be transporting patient? [facility] or [facility]  Who will be with patient? nurse not sure  Is the patient oriented?  A&Ox3  Height:  67 inches          Weight:  139 lbs  Bring list of medications with dose and frequency.  Does patient have any assistive devices?  walker has surgical shoe, wheelchair for longer distances   Is the patient weight bearing?  Yes as tolerated   How many people are needed to move the patient? x1, standby  Does the patient have a pacemaker or AICD?   Medication instructions given - yes   Bring list of medications with dose and frequency.  Please fax medication list and send list of allergies     NPO after midnight  Bring insurance info and drivers license  Wear comfortable clean clothing  Do not bring jewelry or valuables  Shower night before and morning of surgery with a liquid antibacterial soap  Follow all instructions given by your physician. Verified that they received medication list to hold and when to give medication.    needed at discharge  Name of nurse you spoke with: Estella nurse    Nurse will fax latest EKG and CXR when she receives from [facility].  She will also fax last dose MAR to PATDereck  
S: Patient resting in bed. No drainage from incision sites this morning.  No complaints.  Plans to have surgery with podiatry today.       O:   Vitals:    05/01/25 1922 05/01/25 2311 05/02/25 0145 05/02/25 0401   BP: (!) 108/58 (!) 108/55  (!) 108/55   Pulse: 72 72  62   Resp: 18 18 18 18   Temp: 98.4 °F (36.9 °C) 98.3 °F (36.8 °C)  97.9 °F (36.6 °C)   TempSrc: Oral Oral  Oral   SpO2: 96% 96%  94%   Weight:       Height:            Current Facility-Administered Medications   Medication Dose Route Frequency Provider Last Rate Last Admin    lactated ringers infusion   IntraVENous Continuous Garret Collado MD        povidone-iodine (BETADINE) 10 % external solution   Topical Daily Garret Collado MD   Given at 05/01/25 0933    [Held by provider] heparin (porcine) injection 5,000 Units  5,000 Units SubCUTAneous 3 times per day Garret Collado MD        piperacillin-tazobactam (ZOSYN) 3,375 mg in sodium chloride 0.9 % 50 mL IVPB (addEASE)  3,375 mg IntraVENous Q8H Zaki Rick MD 12.5 mL/hr at 05/02/25 0741 3,375 mg at 05/02/25 0741    aspirin chewable tablet 81 mg  81 mg Oral Daily Garret Collado MD   81 mg at 05/01/25 0927    atorvastatin (LIPITOR) tablet 80 mg  80 mg Oral Daily Garret Collado MD   80 mg at 05/01/25 0927    buPROPion (WELLBUTRIN XL) extended release tablet 300 mg  300 mg Oral QAM Garret Collado MD   300 mg at 05/01/25 0927    docusate sodium (COLACE) capsule 100 mg  100 mg Oral BID Garret Collado MD   100 mg at 05/01/25 0933    [Held by provider] doxazosin (CARDURA) tablet 4 mg  4 mg Oral Nightly Garret Collado MD        finasteride (PROSCAR) tablet 5 mg  5 mg Oral Daily Garret Collado MD   5 mg at 05/01/25 0927    gabapentin (NEURONTIN) capsule 100 mg  100 mg Oral TID Garret Collado MD   100 mg at 05/01/25 1929    HYDROcodone-acetaminophen (NORCO) 5-325 MG per tablet 1 tablet  1 tablet Oral Q4H PRN Garret Collado MD   1 tablet at 05/02/25 0145    hydrOXYzine HCl (ATARAX) tablet 10 mg  10 mg 
S: Patient underwent a right 2nd toe amp yesterday. No complaints.      O:   Vitals:    05/03/25 0349 05/03/25 0556 05/03/25 0626 05/03/25 0900   BP: 109/63   129/72   Pulse: 67   70   Resp: 12 18 18 22   Temp: 97.9 °F (36.6 °C)   98.6 °F (37 °C)   TempSrc: Oral      SpO2: 97%   97%   Weight:       Height:            Current Facility-Administered Medications   Medication Dose Route Frequency Provider Last Rate Last Admin    magnesium sulfate 2000 mg in water 50 mL IVPB  2,000 mg IntraVENous Once Garret Collado MD 25 mL/hr at 05/03/25 0951 2,000 mg at 05/03/25 0951    enoxaparin (LOVENOX) injection 40 mg  40 mg SubCUTAneous Daily Garret Collado MD   40 mg at 05/03/25 1002    polyvinyl alcohol (LIQUIFILM TEARS) 1.4 % ophthalmic solution 1 drop  1 drop Both Eyes PRN Garret Collado MD        piperacillin-tazobactam (ZOSYN) 3,375 mg in sodium chloride 0.9 % 50 mL IVPB (addEASE)  3,375 mg IntraVENous Q8H Zaki Rick MD   Stopped at 05/03/25 0800    sodium chloride flush 0.9 % injection 5-40 mL  5-40 mL IntraVENous 2 times per day Sakshi Silverman, LUIS        sodium chloride flush 0.9 % injection 5-40 mL  5-40 mL IntraVENous PRN Sakshi Silverman DPM        0.9 % sodium chloride infusion   IntraVENous PRN Sakshi Silverman, DPM        aspirin chewable tablet 81 mg  81 mg Oral Daily Garret Collado MD   81 mg at 05/03/25 0952    atorvastatin (LIPITOR) tablet 80 mg  80 mg Oral Daily Garret Collado MD   80 mg at 05/03/25 0953    buPROPion (WELLBUTRIN XL) extended release tablet 300 mg  300 mg Oral QAM Garret Collado MD   300 mg at 05/03/25 0953    docusate sodium (COLACE) capsule 100 mg  100 mg Oral BID Garret Collado MD   100 mg at 05/03/25 0952    finasteride (PROSCAR) tablet 5 mg  5 mg Oral Daily Garret Collado MD   5 mg at 05/03/25 0953    gabapentin (NEURONTIN) capsule 100 mg  100 mg Oral TID Garret Collado MD   100 mg at 05/03/25 0953    HYDROcodone-acetaminophen (NORCO) 5-325 MG per tablet 1 tablet  1 tablet 
S: Patient up in chair doing well. No complaints. Podiatry changed the right foot dressing yesterday.      O:   Vitals:    05/03/25 1944 05/04/25 0000 05/04/25 0332 05/04/25 0808   BP: 137/66 114/66 (!) 116/59 (!) 140/74   Pulse: 69 65 65 73   Resp: 18 16 18 18   Temp: 98.4 °F (36.9 °C) 98.2 °F (36.8 °C) 99.3 °F (37.4 °C) 99.2 °F (37.3 °C)   TempSrc: Oral Oral  Oral   SpO2: 97% 100% 95% 96%   Weight:       Height:            Current Facility-Administered Medications   Medication Dose Route Frequency Provider Last Rate Last Admin    potassium chloride (KLOR-CON M) extended release tablet 20 mEq  20 mEq Oral Once Garret Collado MD        magnesium oxide (MAG-OX) tablet 500 mg  500 mg Oral Once Garret Collado MD        lisinopril (PRINIVIL;ZESTRIL) tablet 10 mg  10 mg Oral Daily Garret Collado MD        doxazosin (CARDURA) tablet 2 mg  2 mg Oral Nightly Garret Collado MD        enoxaparin (LOVENOX) injection 40 mg  40 mg SubCUTAneous Daily Garret Collado MD   40 mg at 05/04/25 0810    polyvinyl alcohol (LIQUIFILM TEARS) 1.4 % ophthalmic solution 1 drop  1 drop Both Eyes PRN Garret Collado MD   1 drop at 05/03/25 1715    piperacillin-tazobactam (ZOSYN) 3,375 mg in sodium chloride 0.9 % 50 mL IVPB (addEASE)  3,375 mg IntraVENous Q8H Zaki Rick MD   Stopped at 05/04/25 0841    sodium chloride flush 0.9 % injection 5-40 mL  5-40 mL IntraVENous 2 times per day Sakshi Silverman DPM   10 mL at 05/04/25 0818    sodium chloride flush 0.9 % injection 5-40 mL  5-40 mL IntraVENous PRN Sakshi Silverman DPM        0.9 % sodium chloride infusion   IntraVENous PRN Sakshi Silverman DPM        aspirin chewable tablet 81 mg  81 mg Oral Daily Garret Collado MD   81 mg at 05/04/25 0810    atorvastatin (LIPITOR) tablet 80 mg  80 mg Oral Daily Garret Collado MD   80 mg at 05/04/25 0811    buPROPion (WELLBUTRIN XL) extended release tablet 300 mg  300 mg Oral QAM Garret Collado MD   300 mg at 05/04/25 0810    docusate sodium 
S: Patient wants to walk. I explained to him I desire for him to be on bedrest until 1 PM. We spoke about his need for a right 2nd toe amp now that he has a bypass. No other complaints.      O:   Vitals:    05/01/25 0215 05/01/25 0327 05/01/25 0409 05/01/25 0415   BP: 122/62 (!) 118/56  (!) 111/56   Pulse: 82 83  84   Resp: 14 18 18 18   Temp:  98.7 °F (37.1 °C)     TempSrc:  Oral     SpO2: 97% 94%  99%   Weight:       Height:            Current Facility-Administered Medications   Medication Dose Route Frequency Provider Last Rate Last Admin    magnesium sulfate 2000 mg in water 50 mL IVPB  2,000 mg IntraVENous Once Garret Collado MD        povidone-iodine (BETADINE) 10 % external solution   Topical Daily Garret Collado MD        [START ON 5/3/2025] heparin (porcine) injection 5,000 Units  5,000 Units SubCUTAneous 3 times per day Garret Collado MD        aspirin chewable tablet 81 mg  81 mg Oral Daily Garret Collado MD        atorvastatin (LIPITOR) tablet 80 mg  80 mg Oral Daily Garret Collado MD        buPROPion (WELLBUTRIN XL) extended release tablet 300 mg  300 mg Oral QAM Garret Collado MD        docusate sodium (COLACE) capsule 100 mg  100 mg Oral BID Garret Collado MD        doxazosin (CARDURA) tablet 4 mg  4 mg Oral Nightly Garret Collado MD        finasteride (PROSCAR) tablet 5 mg  5 mg Oral Daily Garret Collado MD        gabapentin (NEURONTIN) capsule 100 mg  100 mg Oral TID Garret Collado MD        HYDROcodone-acetaminophen (NORCO) 5-325 MG per tablet 1 tablet  1 tablet Oral Q4H PRN Garret Collado MD   1 tablet at 05/01/25 0409    hydrOXYzine HCl (ATARAX) tablet 10 mg  10 mg Oral Q4H PRN Garret Collado MD        lisinopril (PRINIVIL;ZESTRIL) tablet 10 mg  10 mg Oral Daily Garret Collado MD        multivitamin 1 tablet  1 tablet Oral Daily Garret Collado MD        pantoprazole (PROTONIX) tablet 40 mg  40 mg Oral [facility] Garrte Collado MD   40 mg at 05/01/25 0613    oxyBUTYnin (DITROPAN-XL) extended release 
[facility]  INPATIENT PHYSICAL THERAPY  EVALUATION  STRZ ICU STEPDOWN TELEMETRY 4K - 4K-04/004-A    Discharge Recommendations: Subacute/Skilled Nursing Facility  Equipment Recommendations: No             Time In: 1133  Time Out: 1150  Timed Code Treatment Minutes: 9 Minutes  Minutes: 17      Date: 2025  Patient Name: Rajat Haywood,  Gender:  male        MRN: [id number]  : 1950  (75 y.o.)      Referring Practitioner: Garret Collado MD  Diagnosis: Gangrene of toe of right foot (HCC)  Additional Pertinent Hx: HPI: Rajat Haywood is an 75 y.o. male with right foot gangrene, PAD. He presents for a right common femoral to anterior tibial artery bypass. No changes since his angiogram.  Patient is having (R) 2nd toe amputation on 25.     Restrictions/Precautions:  Restrictions/Precautions: General Precautions, Fall Risk     Other Position/Activity Restrictions: *post op shoe Rt foot     Subjective:  Chart Reviewed: Yes  Patient assessed for rehabilitation services?: Yes  Subjective: Nurse approved visit.  Patient sitting up in chair.  Patient pleasant and cooperative.  Patient did demonstrate mild confusion but A/Ox4.  Patient left in bedside chair with chair alarm on and call light within reach.    General:  Orientation Level: Oriented X4  Overall Cognitive Status: Exceptions      Pain: 5/10: (R) LE with WB    Vitals: Blood Pressure: 92/57  Respiratory Rate: 24 breaths per minute  Heart Rate: 67 bpm    Social/Functional History:    Lives With:  (Deedee Bond)  Type of Home: Facility  Home Layout: One level  Home Equipment: Walker - Rolling, Wheelchair - Manual         Prior Level of Assist for ADLs: Needs assistance  Toileting: Needs assistance  Prior Level of Assist for Homemaking: Needs assistance  Homemaking Responsibilities: No  Ambulation Assistance: Needs assistance  Prior Level of Assist for Transfers: Needs assistance  Prior Level of Assist for Ambulation:  (Amubulatory short 
Spiritual Health History and Assessment/Progress Note  [facility]    Initial Encounter,  ,  ,      Name: Rajat Haywood MRN: [id number]    Age: 75 y.o.     Sex: male   Language: English   Adventism: Voodoo   Gangrene of toe of right foot (HCC)     Date: 5/6/2025            Total Time Calculated: 20 min              Spiritual Assessment began in STRZ ICU STEPDOWN TELEMETRY 4K        Referral/Consult From: Rounding   Encounter Overview/Reason: Initial Encounter  Service Provided For: Patient    Ivonne, Belief, Meaning:   Patient identifies as spiritual, is connected with a ivonne tradition or spiritual practice, and has beliefs or practices that help with coping during difficult times  Family/Friends No family/friends present      Importance and Influence:  Patient has spiritual/personal beliefs that influence decisions regarding their health  Family/Friends No family/friends present    Community:  Patient is connected with a spiritual community and feels well-supported. Support system includes: [place] [facility] and Other: Nursing home staff  Family/Friends No family/friends present    Assessment and Plan of Care:     Patient Interventions include: Facilitated expression of thoughts and feelings, Engaged in theological reflection, and Affirmed coping skills/support systems  Family/Friends Interventions include: No family/friends present    Patient Plan of Care: Spiritual Care available upon further referral  Family/Friends Plan of Care: No family/friends present    Electronically signed by Tello Brenner,  Intern on 5/6/2025 at 12:07 PM    
agreeable to PT     PAIN: 0/10:     Vitals: Vitals not assessed per clinical judgement, see nursing flowsheet    OBJECTIVE:  Bed Mobility:  Supine to Sit: Supervision, with rail    Transfers:  Sit to Stand: Contact Guard Assistance  Stand to Sit:Contact Guard Assistance, cues for alignment to surface and for safety with assistive device  Stand Pivot:Contact Guard Assistance, cues for alignment to surface and for safety with assistive device, with RW for support    Ambulation:  Contact Guard Assistance  Distance: 15 feet with one change in direction  Surface: Level Tile  Device: Rolling Walker  Gait Deviations: Forward Flexed Posture, Decreased Step Length Bilaterally, Narrow Base of Support, and initial cues for right heel WB      Stairs:  Not Tested    Balance:  Not Tested    Exercise:  Patient was guided in 1 set(s) 15 reps of exercises to both lower extremities: ,Ankle pumps, Glut sets, Quad sets, Heelslides, Short arc quads, Hip abduction/adduction, and Straight leg raises.  Exercises were completed for increased independence with functional mobility.  Trunk 4-way ex (flexion, extension and side bend to each side) completed x 15 reps to increase core strength for postural stability with ambulation    Functional Outcome Measures:  [facility] (6 CLICK) BASIC MOBILITY     AM-PAC Inpatient Mobility without Stair Climbing Raw Score : 15  AM-PAC Inpatient without Stair Climbing T-Scale Score : 43.03     Modified Dm:  Current Functional Status:  Not Applicable    ASSESSMENT:  Assessment: Patient progressing toward established goals.  Activity Tolerance:  Patient tolerance of  treatment:Good.  Plan: Current Treatment Recommendations: Strengthening, Balance training, Functional mobility training, Transfer training, Gait training, Neuromuscular re-education, Endurance training, Patient/Caregiver education & training, Home exercise program, Safety education & training, Therapeutic activities  General Plan:  (4-5x 
agreeable to PT     PAIN: 4/10: right foot    Vitals: Vitals not assessed per clinical judgement, see nursing flowsheet    OBJECTIVE:  Bed Mobility:  Supine to Sit: Stand By Assistance, with increased time for completion  Scooting: Stand By Assistance    Transfers:  Sit to Stand: Contact Guard Assistance  Stand to Sit:Minimal Assistance, X 1, to/from chair with arms, cues for alignment to surface and for safety with assistive device, posterior lean with occasional LOB present when transitioning from stand to sit  To/From Bed and Chair: Minimal Assistance, X 1, cues for alignment to surface and for safety with assistive device with RW for support    Ambulation:  Minimal Assistance, X 1  Distance: 5 feet  Surface: Level Tile  Device: Rolling Walker  Gait Deviations: Forward Flexed Posture, Decreased Step Length Bilaterally, Decreased Weight Shift Right, Unsteady Gait, and cues for heel WB through right foot     Stairs:  Not Tested    Balance:  Static Standing Balance: Contact Guard Assistance, with support of walker    Exercise:  Patient was guided in 1 set(s) 10 reps of exercises to both lower extremities: ,Ankle pumps, Glut sets, Quad sets, Heelslides, Short arc quads, Hip abduction/adduction, and Straight leg raises.  Exercises were completed for increased independence with functional mobility.    Functional Outcome Measures:  [facility] (6 CLICK) BASIC MOBILITY     AM-PAC Inpatient Mobility without Stair Climbing Raw Score : 14  AM-PAC Inpatient without Stair Climbing T-Scale Score : 40.85     Modified Butts:  Current Functional Status:  Not Applicable    ASSESSMENT:  Assessment: Patient progressing toward established goals.  Activity Tolerance:  Patient tolerance of  treatment:Fair.  Plan: Current Treatment Recommendations: Strengthening, Balance training, Functional mobility training, Transfer training, Gait training, Neuromuscular re-education, Endurance training, Patient/Caregiver education & training, Home 
dextrose bolus 10% 250 mL  250 mL IntraVENous PRN Garret Collado MD        glucagon injection 1 mg  1 mg SubCUTAneous PRN Garret Collado MD        dextrose 10 % infusion   IntraVENous Continuous PRN Garret Collado MD       Not on File         Patient Active Problem List   Diagnosis    BPH (benign prostatic hyperplasia)    DDD (degenerative disc disease), cervical    DDD (degenerative disc disease), lumbar    Depression    DM2 (diabetes mellitus, type 2) ([facility])    Dyslipidemia    Erectile dysfunction    Hypertension, essential    Myelomalacia of cervical cord ([facility])    PAD (peripheral artery disease)    Schizophrenia ([facility])    Spinal stenosis, cervical region    Spinal stenosis, lumbar    Physical deconditioning    Frequent falls    Closed compression fracture of thoracolumbar vertebra ([facility])    Anxiety    OAB (overactive bladder)    Pressure injury of skin of coccygeal region    Open wound of right foot    Moderate malnutrition        Past Medical History:   Diagnosis Date    Anxiety     BPH (benign prostatic hyperplasia)     DDD (degenerative disc disease), cervical     DDD (degenerative disc disease), lumbar     Depression     DM2 (diabetes mellitus, type 2) ([facility])     Dyslipidemia     Erectile dysfunction     Hypertension, essential     OAB (overactive bladder)     PAD (peripheral artery disease)     Schizophrenia ([facility])     Spinal stenosis, cervical region     Spinal stenosis, lumbar          Past Surgical History:   Procedure Laterality Date    COLONOSCOPY      FEMORAL-FEMORAL BYPASS GRAFT Right 4/30/2025    RIGHT FEMORAL PERONEAL BYPASS performed by Garret Collado MD at [facility] OR    TOE AMPUTATION Right 5/2/2025    RIGHT 2ND TOE AMPUTATION performed by Gumaro Elizabeth DPM at [facility] OR    UPPER GASTROINTESTINAL ENDOSCOPY           Social History     Socioeconomic History    Marital status:      Spouse name: Not on file    Number of children: Not on file    Years of education: Not on file    Highest education 
tablets by mouth in the morning and at bedtime 650 mg BID and 650 mg q4h PRN pain)      miconazole (MICOTIN) 2 % powder Apply topically 2 times daily. (Patient not taking: Reported on 4/29/2025)      polyethyl glycol-propyl glycol 0.4-0.3 % (SYSTANE) 0.4-0.3 % ophthalmic solution Place 1 drop into both eyes 4 times daily as needed for Dry Eyes (FOR DRY EYES.)      [Paused] paliperidone palmitate ER (INVEGA SUSTENNA) 234 MG/1.5ML MEKHI IM injection Inject 234 mg into the muscle every 21 days NEXT DOSE DUE 3/21/25.[facility]. (Patient not taking: Reported on 4/29/2025)      aspirin 81 MG tablet Take 1 tablet by mouth daily      sildenafil (VIAGRA) 100 MG tablet Take 1 tablet by mouth as needed for Erectile Dysfunction         Review of Systems  ROS as noted in HPI      Objective     Wt Readings from Last 1 Encounters:   04/30/25 62.1 kg (137 lb)       Physical exam (lower extremity focused)  Vascular: Dorsalis pedis and posterior tibial pulses are dopplerable. Skin temperature is warm to warm from proximal tibial tuberosity to distal digits. CFT brisk to exposed digits. Edema present. Hair growth absent.     Dermatologic: Incision to R 2nd digit amputation site is well coapted with sutures in place. No evidence of infection or dehiscence at this time. Erythema and edema consistent with post operative state.      Neurovascular: Light touch and gross sensation intact     Musculoskeletal: Pain with palpation of right foot. Foot and ankle in grossly rectus alignment.               Labs     BMP:   Lab Results   Component Value Date/Time     05/03/2025 04:23 AM    K 3.9 05/03/2025 04:23 AM    K 4.0 03/22/2025 08:21 AM     05/03/2025 04:23 AM    CO2 26 05/03/2025 04:23 AM    BUN 10 05/03/2025 04:23 AM    CREATININE 0.6 05/03/2025 04:23 AM     CBC:   Lab Results   Component Value Date/Time    WBC 7.5 05/03/2025 04:23 AM    HGB 12.7 05/03/2025 04:23 AM    HCT 40.4 05/03/2025 04:23 AM     PT/INR: 
  Date Value Ref Range Status   05/03/2025 87.1 80.0 - 94.0 fL Final     MCH   Date Value Ref Range Status   05/03/2025 27.4 26.0 - 33.0 pg Final     MCHC   Date Value Ref Range Status   05/03/2025 31.4 (L) 32.2 - 35.5 gm/dl Final     RDW   Date Value Ref Range Status   04/28/2025 18.4 (H) <15.1 % Final     Platelets   Date Value Ref Range Status   05/03/2025 296 130 - 400 thou/mm3 Final     MPV   Date Value Ref Range Status   05/03/2025 9.6 9.4 - 12.4 fL Final     Comment:     [place] [address]        Magnesium   Date Value Ref Range Status   05/04/2025 1.9 1.6 - 2.6 mg/dL Final     Comment:     [place] [address]        Heart: RRR  Lungs: clear bilat  Abd: soft, NT  MSK: right groin, right medial leg and right lower leg anterior compartment incisions all c/d/I. No drainage or bleeding from the incisions, no cellulitis.  Palpable pulse in the bypass, Right AT signal.   Dressings on right foot s/p toe amputation. Pictures from podiatry reviewed.      A/P: 74 yo male with schizophrenia, tobacco abuse, DM2, PAD, presents with right 2nd toe gangrene, CLI    4/30/25 [facility]:  Right common femoral to anterior tibial artery bypass with reversed right greater saphenous vein   Right common femoral and profunda femoris endarterectomies    5/2/25 Herrera/Glenn (podiatry): Right 2nd toe amputation    PAD s/p bypass: OOB, ambulate, PT, OT. He is heel touch only on the right foot. Heel strike shoe ordered. Continue ASA, statin. DVT prophylaxis.   Right 2nd toe gangrene s/p amputation: I appreciate ID and podiatry's assistance. Wound care and right heel touch only weight bearing orders per podiatry. IV Abx per ID.   Sacral skin/wound care per wound team and nursing.   Moderate vs severe protein calorie malnutrition: Protein supplements and regular diabetic diet ordered. Glucoses are under control for now.   DM2: Insulin SSI. Hold home 
evaluated  Patient s/p 1 day R 2nd digit amputation   WBC 7.5  ; afebrile   IV zosyn per ID  XR right foot; reviewed   Wound culture preliminary; aerobic and anaerobic showing G+ bacilli consistent with corynebacterium    Dressing; betadine soaked gauze, kerlix, ace   Keep foot clean and dry. Do not get R foot wet.   Orders placed for daily dressings as above per nursing every other day. Dressing instructions placed in RYAN.   Heel (partial) weightbearing as tolerated in post op shoe for transfers and balance  SNF vs inpatient rehab for discharge.   Pain medication scripts written by Dr. Elizabeth placed in patients chart.   Follow up with Dr. Elizabeth outpatient within a week of discharge. Please call office to verify follow up at [phone].   Patient expressed understanding and agreement with current plan. All questions answered.     Dispo: Patient stable for discharge from podiatry standpoint. Patient to follow up with Dr. Elizabeth outpatient.     Sakshi Silverman DPM PGY-1  05/05/25 6:45 AM

## 2025-05-07 NOTE — DISCHARGE SUMMARY
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 ([facility]) [I70.261]     * Critical limb ischemia of right lower extremity ([facility]) [I70.221]  PAD, right 2nd toe gangrene, DM2, tobacco abuse, schizophrenia    Condition: Stable    Problem List:   Patient Active Problem List   Diagnosis Code    BPH (benign prostatic hyperplasia) N40.0    DDD (degenerative disc disease), cervical M50.30    DDD (degenerative disc disease), lumbar M51.369    Depression F32.A    DM2 (diabetes mellitus, type 2) ([facility]) E11.9    Dyslipidemia E78.5    Erectile dysfunction N52.9    Hypertension, essential I10    Myelomalacia of cervical cord ([facility]) G95.89    PAD (peripheral artery disease) I73.9    Schizophrenia ([facility]) F20.9    Spinal stenosis, cervical region M48.02    Spinal stenosis, lumbar M48.061    Physical deconditioning R53.81    Frequent falls R29.6    Closed compression fracture of thoracolumbar vertebra ([facility]) S22.080A, S32.010A    Anxiety F41.9    OAB (overactive bladder) N32.81    Pressure injury of skin of coccygeal region L89.159    Open wound of right foot S91.301A    Moderate malnutrition E44.0       Procedures:  4/30/25   Right common femoral to anterior tibial artery bypass with reversed right greater saphenous vein   Right common femoral and profunda femoris endarterectomies    DATE OF PROCEDURE:  05/02/2025     SURGEON:  Gumaro Elizabeth DPM     ASSISTANTS:    1. Dr. Vinayak Fuller.  2. Dr. Perry Huerta.

## 2025-05-07 NOTE — PLAN OF CARE
Problem: Chronic Conditions and Co-morbidities  Goal: Patient's chronic conditions and co-morbidity symptoms are monitored and maintained or improved  Outcome: Progressing  Flowsheets (Taken 5/7/2025 0743)  Care Plan - Patient's Chronic Conditions and Co-Morbidity Symptoms are Monitored and Maintained or Improved:   Monitor and assess patient's chronic conditions and comorbid symptoms for stability, deterioration, or improvement   Collaborate with multidisciplinary team to address chronic and comorbid conditions and prevent exacerbation or deterioration   Update acute care plan with appropriate goals if chronic or comorbid symptoms are exacerbated and prevent overall improvement and discharge     Problem: Discharge Planning  Goal: Discharge to home or other facility with appropriate resources  Outcome: Progressing  Flowsheets (Taken 5/7/2025 0743)  Discharge to home or other facility with appropriate resources:   Identify barriers to discharge with patient and caregiver   Arrange for needed discharge resources and transportation as appropriate   Identify discharge learning needs (meds, wound care, etc)     Problem: Pain  Goal: Verbalizes/displays adequate comfort level or baseline comfort level  Outcome: Progressing  Flowsheets (Taken 5/7/2025 0743)  Verbalizes/displays adequate comfort level or baseline comfort level: Encourage patient to monitor pain and request assistance     Problem: Safety - Adult  Goal: Free from fall injury  Outcome: Progressing     Problem: Skin/Tissue Integrity  Goal: Skin integrity remains intact  Description: 1.  Monitor for areas of redness and/or skin breakdown2.  Assess vascular access sites hourly3.  Every 4-6 hours minimum:  Change oxygen saturation probe site4.  Every 4-6 hours:  If on nasal continuous positive airway pressure, respiratory therapy assess nares and determine need for appliance change or resting period  Outcome: Progressing  Flowsheets (Taken 5/7/2025 0743)  Skin

## 2025-05-12 LAB
BUN / CREAT RATIO: 20 (ref 7–25)
BUN BLDV-MCNC: 12 MG/DL (ref 3–29)
CALCIUM SERPL-MCNC: 9.4 MG/DL (ref 8.5–10.5)
CHLORIDE BLD-SCNC: 108 MEQ/L (ref 96–110)
CO2: 25 MEQ/L (ref 19–32)
CREAT SERPL-MCNC: 0.6 MG/DL (ref 0.5–1.2)
ESTIMATED GLOMERULAR FILTRATION RATE CREATININE EQUATION: 102 MLS/MIN/1.73M2
FASTING STATUS: NORMAL
GLUCOSE BLD-MCNC: 96 MG/DL (ref 70–99)
HCT VFR BLD CALC: 40 % (ref 37.5–51)
HEMOGLOBIN: 12.6 G/DL (ref 13–17.7)
MCH RBC QN AUTO: 27 PG (ref 26–34)
MCHC RBC AUTO-ENTMCNC: 31.5 G/DL (ref 30.7–35.5)
MCV RBC AUTO: 85.7 FL (ref 80–100)
PDW BLD-RTO: 17.9 %
PLATELET # BLD: 384 K/UL (ref 140–400)
PMV BLD AUTO: 10.2 FL (ref 7.2–11.7)
POTASSIUM SERPL-SCNC: 4 MEQ/L (ref 3.4–5.3)
RBC # BLD: 4.67 M/UL (ref 4.14–5.8)
SODIUM BLD-SCNC: 143 MEQ/L (ref 135–148)
WBC # BLD: 8.2 K/UL (ref 3.5–10.9)

## 2025-05-21 ENCOUNTER — OFFICE VISIT (OUTPATIENT)
Age: 75
End: 2025-05-21

## 2025-05-21 VITALS
WEIGHT: 136.6 LBS | HEART RATE: 98 BPM | HEIGHT: 67 IN | SYSTOLIC BLOOD PRESSURE: 101 MMHG | BODY MASS INDEX: 21.44 KG/M2 | DIASTOLIC BLOOD PRESSURE: 55 MMHG

## 2025-05-21 DIAGNOSIS — I73.9 PAD (PERIPHERAL ARTERY DISEASE): Primary | Chronic | ICD-10-CM

## 2025-05-21 PROCEDURE — 99024 POSTOP FOLLOW-UP VISIT: CPT | Performed by: PHYSICIAN ASSISTANT

## 2025-05-21 NOTE — PROGRESS NOTES
CT/CV Surgery Follow Up Office Visit      Patient's Name/Date of Birth: Rajat Haywood / 1950 (75 y.o.)    CC:   Chief Complaint   Patient presents with    Post-Op Check      PCP: Sienna Hurst APRN - CNP    Date: May 21, 2025     HPI:   We had the pleasure of seeing Rajat Haywood in the office today, as you know this is a very pleasant 75 y.o. year old male with a history of PAD with right 2nd toe gangrene and CLI leading to surgery listed below.  He has hx of schizophrenia, tobacco abuse, DM2, and PAD.  He resides at [facility] and has been getting PT/OT to help increase his muscle strength and conditioning he states.  He denies any problems with his right foot at today's OV.    4/30/25 [facility]:  Right common femoral to anterior tibial artery bypass with reversed right greater saphenous vein   Right common femoral and profunda femoris endarterectomies     5/2/25 Herrera/Glenn (podiatry): Right 2nd toe amputation      Past Medical History:  Rajat  has a past medical history of Anxiety, BPH (benign prostatic hyperplasia), DDD (degenerative disc disease), cervical, DDD (degenerative disc disease), lumbar, Depression, DM2 (diabetes mellitus, type 2) ([facility]), Dyslipidemia, Erectile dysfunction, Hypertension, essential, OAB (overactive bladder), PAD (peripheral artery disease), Schizophrenia ([facility]), Spinal stenosis, cervical region, and Spinal stenosis, lumbar.    Past Surgical History:  The patient  has a past surgical history that includes Colonoscopy; Upper gastrointestinal endoscopy; Femoral-femoral Bypass Graft (Right, 4/30/2025); and Toe amputation (Right, 5/2/2025).    Allergies:  The patient has no known allergies.    Medications:  Prior to Admission medications    Medication Sig Start Date End Date Taking? Authorizing Provider   gabapentin (NEURONTIN) 100 MG capsule Take 1 capsule by mouth 3 times daily.   Yes Provider, MD Vinay   acetaminophen (TYLENOL) 325 MG tablet Take 2 tablets by mouth in the

## 2025-05-21 NOTE — PATIENT INSTRUCTIONS
If you receive a survey asking about your care experience, please respond. Your answers will help ensure you receive high-quality care at this office. Thank you!    Your Medical Assistant today: Marisela PETE  Thank you for coming to our office! It was a pleasure to serve you.

## 2025-06-12 ENCOUNTER — OFFICE VISIT (OUTPATIENT)
Age: 75
End: 2025-06-12
Payer: OTHER GOVERNMENT

## 2025-06-12 VITALS
BODY MASS INDEX: 21.5 KG/M2 | HEART RATE: 60 BPM | DIASTOLIC BLOOD PRESSURE: 55 MMHG | SYSTOLIC BLOOD PRESSURE: 113 MMHG | HEIGHT: 67 IN | WEIGHT: 137 LBS

## 2025-06-12 DIAGNOSIS — I73.9 PAD (PERIPHERAL ARTERY DISEASE): Primary | ICD-10-CM

## 2025-06-12 DIAGNOSIS — I70.261 CRITICAL LIMB ISCHEMIA OF RIGHT LOWER EXTREMITY WITH GANGRENE (HCC): ICD-10-CM

## 2025-06-12 PROCEDURE — 99214 OFFICE O/P EST MOD 30 MIN: CPT | Performed by: SURGERY

## 2025-06-12 PROCEDURE — 3074F SYST BP LT 130 MM HG: CPT | Performed by: SURGERY

## 2025-06-12 PROCEDURE — 3078F DIAST BP <80 MM HG: CPT | Performed by: SURGERY

## 2025-06-12 PROCEDURE — 1123F ACP DISCUSS/DSCN MKR DOCD: CPT | Performed by: SURGERY

## 2025-06-12 RX ORDER — HYDROCODONE BITARTRATE AND ACETAMINOPHEN 5; 325 MG/1; MG/1
1 TABLET ORAL EVERY 6 HOURS PRN
COMMUNITY

## 2025-06-12 ASSESSMENT — ENCOUNTER SYMPTOMS
ABDOMINAL PAIN: 0
CHEST TIGHTNESS: 0
RHINORRHEA: 0
SHORTNESS OF BREATH: 0

## 2025-06-12 NOTE — PROGRESS NOTES
Chief Complaint   Patient presents with    Post-Op Check         HPI: Rajat Haywood is an 75 y.o. male with right 2nd toe gangrene s/p right CFA to AT bypass with reversed GSV on 4/30/25. His right 2nd toe amp wound is mostly healed. He is in a SNF. He has schizophrenia but is able to answer most questions. An arterial duplex of the bypass was not performed yet.      Review of Systems   Constitutional:  Negative for diaphoresis and fatigue.   HENT:  Negative for rhinorrhea.    Eyes:  Negative for visual disturbance.   Respiratory:  Negative for chest tightness and shortness of breath.    Cardiovascular:  Negative for chest pain and leg swelling.   Gastrointestinal:  Negative for abdominal pain.   Genitourinary:  Negative for dysuria.   Neurological:  Negative for speech difficulty and numbness.   Psychiatric/Behavioral:  Negative for behavioral problems and confusion.           Allergies: No Known Allergies      Current Outpatient Medications   Medication Sig Dispense Refill    povidone-iodine (BETADINE) 7.5 % external solution Apply topically once Apply to RT 2nd toe incision topically one time a day every other day      miconazole nitrate (LOTRIMIN AF) 2 % AERP Apply topically      HYDROcodone-acetaminophen (NORCO) 5-325 MG per tablet Take 1 tablet by mouth every 6 hours as needed for Pain. Max Daily Amount: 4 tablets      gabapentin (NEURONTIN) 100 MG capsule Take 1 capsule by mouth 3 times daily.      acetaminophen (TYLENOL) 325 MG tablet Take 2 tablets by mouth in the morning and at bedtime      hydrOXYzine HCl (ATARAX) 10 MG tablet Take 1 tablet by mouth every 4 hours as needed for Itching      docusate sodium (COLACE) 100 MG capsule Take 1 capsule by mouth daily      polyethylene glycol (GLYCOLAX) 17 g packet Take 1 packet by mouth daily as needed for Constipation      doxazosin (CARDURA) 4 MG tablet Take 1 tablet by mouth at bedtime      paliperidone (INVEGA) 6 MG extended release tablet Take 2 tablets by

## 2025-06-17 ENCOUNTER — HOSPITAL ENCOUNTER (OUTPATIENT)
Dept: INTERVENTIONAL RADIOLOGY/VASCULAR | Age: 75
Discharge: HOME | End: 2025-06-19
Attending: SURGERY
Payer: OTHER GOVERNMENT

## 2025-06-17 DIAGNOSIS — I73.9 PAD (PERIPHERAL ARTERY DISEASE): ICD-10-CM

## 2025-06-17 DIAGNOSIS — I70.261 CRITICAL LIMB ISCHEMIA OF RIGHT LOWER EXTREMITY WITH GANGRENE (HCC): ICD-10-CM

## 2025-06-17 PROCEDURE — 93926 LOWER EXTREMITY STUDY: CPT

## 2025-06-18 LAB
A/G RATIO: 1.5 RATIO (ref 0.8–2.6)
ALBUMIN: 3.7 G/DL (ref 3.5–5.2)
ALP BLD-CCNC: 91 U/L (ref 23–144)
ALT SERPL-CCNC: 22 U/L (ref 0–60)
AST SERPL-CCNC: 15 U/L (ref 0–55)
BILIRUB SERPL-MCNC: 0.5 MG/DL (ref 0–1.2)
BUN / CREAT RATIO: 30 (ref 7–25)
BUN BLDV-MCNC: 21 MG/DL (ref 3–29)
CALCIUM SERPL-MCNC: 8.6 MG/DL (ref 8.5–10.5)
CHLORIDE BLD-SCNC: 105 MEQ/L (ref 96–110)
CHOLESTEROL, TOTAL: 104 MG/DL
CO2: 23 MEQ/L (ref 19–32)
CREAT SERPL-MCNC: 0.7 MG/DL (ref 0.5–1.2)
ESTIMATED GLOMERULAR FILTRATION RATE CREATININE EQUATION: 97 MLS/MIN/1.73M2
FASTING STATUS: ABNORMAL
GLOBULIN: 2.4 G/DL (ref 1.9–3.6)
GLUCOSE BLD-MCNC: 108 MG/DL (ref 70–99)
HBA1C MFR BLD: 5.9 %
HCT VFR BLD CALC: 41.7 % (ref 37.5–51)
HDLC SERPL-MCNC: 41 MG/DL
HEMOGLOBIN: 13.6 G/DL (ref 13–17.7)
LDL CHOLESTEROL: 50 MG/DL
MCH RBC QN AUTO: 28.7 PG (ref 26–34)
MCHC RBC AUTO-ENTMCNC: 32.6 G/DL (ref 30.7–35.5)
MCV RBC AUTO: 88 FL (ref 80–100)
PDW BLD-RTO: 17.8 %
PLATELET # BLD: 394 K/UL (ref 140–400)
PMV BLD AUTO: 9.8 FL (ref 7.2–11.7)
POTASSIUM SERPL-SCNC: 4.4 MEQ/L (ref 3.4–5.3)
RBC # BLD: 4.74 M/UL (ref 4.14–5.8)
SODIUM BLD-SCNC: 139 MEQ/L (ref 135–148)
T4 FREE: 1.54 NG/DL (ref 0.8–1.8)
TOTAL PROTEIN: 6.1 G/DL (ref 6–8.3)
TRIGL SERPL-MCNC: 65 MG/DL
TSH ULTRASENSITIVE: 0.98 MCIU/ML (ref 0.4–4.5)
VLDLC SERPL CALC-MCNC: 13 MG/DL (ref 4–38)
WBC # BLD: 8.9 K/UL (ref 3.5–10.9)

## 2025-07-02 ENCOUNTER — OFFICE VISIT (OUTPATIENT)
Age: 75
End: 2025-07-02
Payer: OTHER GOVERNMENT

## 2025-07-02 VITALS
HEIGHT: 67 IN | SYSTOLIC BLOOD PRESSURE: 135 MMHG | DIASTOLIC BLOOD PRESSURE: 62 MMHG | HEART RATE: 60 BPM | BODY MASS INDEX: 21.31 KG/M2 | WEIGHT: 135.8 LBS

## 2025-07-02 DIAGNOSIS — I70.221 CRITICAL LIMB ISCHEMIA OF RIGHT LOWER EXTREMITY (HCC): Primary | ICD-10-CM

## 2025-07-02 PROCEDURE — 99214 OFFICE O/P EST MOD 30 MIN: CPT | Performed by: PHYSICIAN ASSISTANT

## 2025-07-02 PROCEDURE — 1123F ACP DISCUSS/DSCN MKR DOCD: CPT | Performed by: PHYSICIAN ASSISTANT

## 2025-07-02 NOTE — PROGRESS NOTES
06:39 AM    BUN 21 06/18/2025 06:39 AM    CREATININE 0.7 06/18/2025 06:39 AM    MG 1.9 05/04/2025 03:54 AM     Active Problem List  Patient Active Problem List   Diagnosis    BPH (benign prostatic hyperplasia)    DDD (degenerative disc disease), cervical    DDD (degenerative disc disease), lumbar    Depression    DM2 (diabetes mellitus, type 2) ([facility])    Dyslipidemia    Erectile dysfunction    Hypertension, essential    Myelomalacia of cervical cord ([facility])    PAD (peripheral artery disease)    Schizophrenia ([facility])    Spinal stenosis, cervical region    Spinal stenosis, lumbar    Physical deconditioning    Frequent falls    Closed compression fracture of thoracolumbar vertebra ([facility])    Anxiety    OAB (overactive bladder)    Pressure injury of skin of coccygeal region    Open wound of right foot    Moderate malnutrition     Assessment:  PAD  -  S/P 4/30/25 Right common femoral to anterior tibial artery bypass with reversed right greater saphenous vein.  S/P Right common femoral and profunda femoris endarterectomies  -  5/2/25 Herrera/Glenn (podiatry): Right 2nd toe amputation    Plan:   Continue current medical therapy.  F/U OV in 6 months with repeat ABIs.    Thank you for allowing us to be involved in the patient's care.    Electronically by Chucho Barr PA-C  on 7/2/2025 at 10:54 AM

## 2025-08-08 LAB
BACTERIA, URINE: ABNORMAL /HPF
BILIRUBIN, URINE: NEGATIVE MG/DL
CLARITY, UA: ABNORMAL
COLOR, UA: YELLOW
EPITHELIAL CELLS, UA: ABNORMAL /HPF (ref 0–5)
ERYTHROCYTES URINE: ABNORMAL /HPF (ref 0–2)
GLUCOSE URINE: NEGATIVE MG/DL
KETONES, URINE: NEGATIVE MG/DL
LEUKOCYTE CLUMPS IN URINE BY AUTOMATED COUNT: >10 (ref 0–1)
LEUKOCYTE ESTERASE, URINE: ABNORMAL
LEUKOCYTES, UA: >100 /HPF (ref 0–5)
NITRITE, URINE: POSITIVE
PH, URINE: 7.5 (ref 4.5–8)
PROTEIN, URINE: 10 MG/DL
SPECIFIC GRAVITY UA: 1.01 (ref 1–1.03)
URINE HGB: NEGATIVE MG/DL
UROBILINOGEN, URINE: <2 MG/DL (ref 0–2)

## 2025-08-22 LAB
BACTERIA, URINE: NORMAL /HPF
BILIRUBIN, URINE: NORMAL MG/DL
BROAD CASTS: NORMAL /LPF
CALCIUM OXALATE CRYSTALS: NORMAL /HPF
CALCIUM OXALATE CRYSTALS: NORMAL /HPF
CALCIUM PHOSPHATE CRYSTALS: NORMAL /HPF
CELLULAR CASTS: NORMAL /LPF
CLARITY, UA: NORMAL
COLOR, UA: NORMAL
COMMENT: NORMAL
CRYSTALS: NORMAL /HPF
CYSTINE CRYSTALS: NORMAL /HPF
EPITHELIAL CASTS: NORMAL /LPF
EPITHELIAL CELLS, UA: NORMAL /HPF (ref 0–5)
EPITHELIAL CELLS, UA: NORMAL /HPF (ref 0–5)
ERYTHROCYTES URINE: NORMAL /HPF (ref 0–2)
FATTY CASTS: NORMAL /LPF
GLUCOSE URINE: NORMAL MG/DL
GRANULAR CASTS: NORMAL /LPF
HYALINE CASTS: NORMAL /LPF (ref 0–5)
KETONES, URINE: NORMAL MG/DL
LEUCINE CRYSTALS: NORMAL /HPF
LEUKOCYTE CLUMPS IN URINE BY AUTOMATED COUNT: NORMAL (ref 0–1)
LEUKOCYTE ESTERASE, URINE: NORMAL
LEUKOCYTES, UA: NORMAL /HPF (ref 0–5)
MUCUS: NORMAL
NITRITE, URINE: NORMAL
PH, URINE: NORMAL (ref 4.5–8)
PROTEIN, URINE: NORMAL MG/DL
RBC CASTS: NORMAL /LPF
SPECIFIC GRAVITY UA: NORMAL (ref 1–1.03)
SPERM OBSERVATION: NORMAL
TRIPLE PHOSPHATE CRYSTALS: NORMAL /HPF
TYROSINE CRYSTALS: NORMAL /HPF
URIC ACID CRYSTALS: NORMAL /HPF
URINE HGB: NORMAL MG/DL
UROBILINOGEN, URINE: NORMAL MG/DL (ref 0–2)
WAXY CASTS: NORMAL /LPF
WBC CASTS: NORMAL /LPF
YEAST, URINE: NORMAL /HPF

## 2025-09-01 LAB
BACTERIA, URINE: ABNORMAL /HPF
BILIRUBIN, URINE: NEGATIVE MG/DL
CLARITY, UA: ABNORMAL
COLOR, UA: YELLOW
EPITHELIAL CELLS, UA: ABNORMAL /HPF (ref 0–5)
ERYTHROCYTES URINE: ABNORMAL /HPF (ref 0–2)
GLUCOSE URINE: NEGATIVE MG/DL
KETONES, URINE: NEGATIVE MG/DL
LEUKOCYTE CLUMPS IN URINE BY AUTOMATED COUNT: ABNORMAL (ref 0–1)
LEUKOCYTE ESTERASE, URINE: ABNORMAL
LEUKOCYTES, UA: >100 /HPF (ref 0–5)
NITRITE, URINE: NEGATIVE
PH, URINE: 6 (ref 4.5–8)
PROTEIN, URINE: 10 MG/DL
SPECIFIC GRAVITY UA: 1.02 (ref 1–1.03)
URINE HGB: ABNORMAL MG/DL
UROBILINOGEN, URINE: 2 MG/DL (ref 0–2)

## (undated) DEVICE — LOOP VES W13MM THK09MM MINI RED SIL FLD REPELLENT

## (undated) DEVICE — 3M™ IOBAN™ 2 ANTIMICROBIAL INCISE DRAPE 6651EZ: Brand: IOBAN™ 2

## (undated) DEVICE — GEL US 20GM LO VISC STRL AQSNC 100

## (undated) DEVICE — BASIC SINGLE BASIN BTC-LF: Brand: MEDLINE INDUSTRIES, INC.

## (undated) DEVICE — SUTURE NONABSORBABLE MONOFILAMENT 7-0 BV-1 1X24 IN PROLENE 8702H

## (undated) DEVICE — GAUZE,SPONGE,4"X4",12PLY,STERILE,LF,2'S: Brand: MEDLINE

## (undated) DEVICE — CATHETER,URETHRAL,REDRUBBER,STRL,16FR: Brand: MEDLINE

## (undated) DEVICE — SUTURE VICRYL COATED PLU 4 0 UN SH TAPER POINT CIRCLE BRAIDED

## (undated) DEVICE — CANNULA INJ L2.5IN BLNT TIP 3MM CLR BODY W/ 1 W VLV DLP

## (undated) DEVICE — SUTURE PERMAHAND SZ 4-0 L18IN NONABSORBABLE BLK SILK BRAID A183H

## (undated) DEVICE — DRESSING PETRO W3XL3IN OIL EMUL N ADH GZ KNIT IMPREG CELOS

## (undated) DEVICE — BANDAGE COMPR W4INXL12FT E DISP ESMARCH EVEN

## (undated) DEVICE — SUTURE MONOCRYL SZ 5-0 L18IN ABSRB UD L11MM P-1 3/8 CIR PRIM Y490G

## (undated) DEVICE — SUTURE PERMAHAND SZ 2-0 L12X18IN NONABSORBABLE BLK SILK A185H

## (undated) DEVICE — LOOP VES W25MM THK1MM MAXI RED SIL FLD REPELLENT 100 PER

## (undated) DEVICE — 450 ML BOTTLE OF 0.05% CHLORHEXIDINE GLUCONATE IN 99.95% STERILE WATER FOR IRRIGATION, USP AND APPLICATOR.: Brand: IRRISEPT ANTIMICROBIAL WOUND LAVAGE

## (undated) DEVICE — GLOVE ORANGE PI 7 1/2   MSG9075

## (undated) DEVICE — TUBING, SUCTION, 1/4" X 20', STRAIGHT: Brand: MEDLINE INDUSTRIES, INC.

## (undated) DEVICE — GLOVE ORANGE PI 8 1/2   MSG9085

## (undated) DEVICE — SUTURE VICRYL + SZ 3-0 L27IN ABSRB UD L26MM SH 1/2 CIR VCP416H

## (undated) DEVICE — TAPE UMBILICAL W1/16XL30IN COTTON ROUND NONRADIOPAQUE

## (undated) DEVICE — Y-TYPE TUR/BLADDER IRRIGATION SET, REGULATING CLAMP

## (undated) DEVICE — MARKER,SKIN,WI/RULER AND LABELS: Brand: MEDLINE

## (undated) DEVICE — PACK-MAJOR

## (undated) DEVICE — MASTISOL ADHESIVE LIQ 2/3ML

## (undated) DEVICE — 3M™ IOBAN™ 2 ANTIMICROBIAL INCISE DRAPE 6650EZ: Brand: IOBAN™ 2

## (undated) DEVICE — SHEET, ORTHO, SPLIT, STERILE: Brand: MEDLINE

## (undated) DEVICE — AGENT HEMSTAT 3GM OXIDIZED REGENERATED CELOS ABSRB FOR CONT (ORDER MULTIPLES OF 5EA)

## (undated) DEVICE — BLADE ES ELASTOMERIC COAT INSUL DURABLE BEND UPTO 90DEG

## (undated) DEVICE — BOOT,SUTURE,STANDARD,YELLOW-IN-BLUE: Brand: MEDLINE

## (undated) DEVICE — BLADE ES L4IN INSUL EDGE

## (undated) DEVICE — THIN OFFSET (9.0 X 0.38 X 25.0MM)

## (undated) DEVICE — PENCIL SMK EVAC ALL IN 1 DSGN ENH VISIBILITY IMPROVED AIR

## (undated) DEVICE — DRESSING HYDROFIBER AQUACEL AG ADVANTAGE 3.5X12 IN

## (undated) DEVICE — SUTURE PROL SZ 6-0 L24IN NONABSORBABLE BLU L13MM C-1 3/8 8726H

## (undated) DEVICE — SUTURE PERMAHAND SZ 3-0 L18IN NONABSORBABLE BLK SILK BRAID A184H

## (undated) DEVICE — SUTURE PROL SZ 3-0 L18IN NONABSORBABLE BLU L30MM PS-1 3/8 8663G

## (undated) DEVICE — Device

## (undated) DEVICE — CLIP LIG SM TI 6 BLU HNDL FOR OPN AND ENDOSCP SGL APPL

## (undated) DEVICE — HYPODERMIC SAFETY NEEDLE: Brand: MAGELLAN

## (undated) DEVICE — SPONGE LAP W18XL18IN WHT COT 4 PLY FLD STRUNG RADPQ DISP ST 2 PER PACK

## (undated) DEVICE — SUTURE N ABSRB L 36 IN SZ 5-0 NDL L 13 MM POLYPRO OR PPL BLU

## (undated) DEVICE — VASCULAR: Brand: MEDLINE INDUSTRIES, INC.

## (undated) DEVICE — SUTURE VICRYL SZ 2-0 L27IN ABSRB UD L26MM SH 1/2 CIR J417H

## (undated) DEVICE — PROVE COVER: Brand: UNBRANDED

## (undated) DEVICE — CLIP LIG M TI 6 SIL HNDL FOR OPN AND ENDOSCP SGL APPL

## (undated) DEVICE — AGENT HEMOSTATIC SURG NU-KNIT ABS 3X4IN WOVEN KNIT 12/BX

## (undated) DEVICE — BANDAGE,GAUZE,4.5"X4.1YD,STERILE,LF: Brand: MEDLINE

## (undated) DEVICE — SUTURE PROL SZ 6-0 L24IN NONABSORBABLE BLU L9.3MM BV-1 3/8 8805H

## (undated) DEVICE — SOL IRR SOD CHL 0.9% TITAN XL CNTNR 3000ML

## (undated) DEVICE — 1LYRTR 16FR10ML100%SIL UMS SNP: Brand: MEDLINE INDUSTRIES, INC.

## (undated) DEVICE — SUTURE ETHILON SZ 3-0 L18IN NONABSORBABLE BLK L24MM FS-1 3/8 663G